# Patient Record
Sex: FEMALE | Race: WHITE | NOT HISPANIC OR LATINO
[De-identification: names, ages, dates, MRNs, and addresses within clinical notes are randomized per-mention and may not be internally consistent; named-entity substitution may affect disease eponyms.]

---

## 2017-08-12 ENCOUNTER — APPOINTMENT (OUTPATIENT)
Dept: UROLOGY | Facility: CLINIC | Age: 62
End: 2017-08-12
Payer: COMMERCIAL

## 2017-08-12 VITALS
SYSTOLIC BLOOD PRESSURE: 128 MMHG | WEIGHT: 115 LBS | HEART RATE: 60 BPM | HEIGHT: 63 IN | RESPIRATION RATE: 14 BRPM | TEMPERATURE: 97.7 F | BODY MASS INDEX: 20.38 KG/M2 | DIASTOLIC BLOOD PRESSURE: 75 MMHG

## 2017-08-12 DIAGNOSIS — Z86.39 PERSONAL HISTORY OF OTHER ENDOCRINE, NUTRITIONAL AND METABOLIC DISEASE: ICD-10-CM

## 2017-08-12 DIAGNOSIS — R31.29 OTHER MICROSCOPIC HEMATURIA: ICD-10-CM

## 2017-08-12 PROCEDURE — 99204 OFFICE O/P NEW MOD 45 MIN: CPT

## 2017-08-14 LAB — BACTERIA UR CULT: NORMAL

## 2017-08-16 LAB — CORE LAB FLUID CYTOLOGY: NORMAL

## 2017-09-11 ENCOUNTER — APPOINTMENT (OUTPATIENT)
Dept: UROLOGY | Facility: CLINIC | Age: 62
End: 2017-09-11
Payer: COMMERCIAL

## 2017-09-11 VITALS
BODY MASS INDEX: 20.73 KG/M2 | SYSTOLIC BLOOD PRESSURE: 109 MMHG | DIASTOLIC BLOOD PRESSURE: 64 MMHG | HEART RATE: 60 BPM | WEIGHT: 117 LBS

## 2017-09-11 PROCEDURE — 99214 OFFICE O/P EST MOD 30 MIN: CPT

## 2017-09-11 PROCEDURE — 81003 URINALYSIS AUTO W/O SCOPE: CPT | Mod: QW

## 2017-09-27 ENCOUNTER — APPOINTMENT (OUTPATIENT)
Dept: UROLOGY | Facility: CLINIC | Age: 62
End: 2017-09-27
Payer: COMMERCIAL

## 2017-09-27 PROCEDURE — 99214 OFFICE O/P EST MOD 30 MIN: CPT

## 2017-10-20 ENCOUNTER — OUTPATIENT (OUTPATIENT)
Dept: OUTPATIENT SERVICES | Facility: HOSPITAL | Age: 62
LOS: 1 days | End: 2017-10-20
Payer: COMMERCIAL

## 2017-10-20 VITALS
HEIGHT: 62 IN | RESPIRATION RATE: 18 BRPM | SYSTOLIC BLOOD PRESSURE: 118 MMHG | DIASTOLIC BLOOD PRESSURE: 76 MMHG | WEIGHT: 117.07 LBS | OXYGEN SATURATION: 95 % | HEART RATE: 79 BPM | TEMPERATURE: 99 F

## 2017-10-20 DIAGNOSIS — Z98.51 TUBAL LIGATION STATUS: Chronic | ICD-10-CM

## 2017-10-20 DIAGNOSIS — Z98.891 HISTORY OF UTERINE SCAR FROM PREVIOUS SURGERY: Chronic | ICD-10-CM

## 2017-10-20 DIAGNOSIS — Z01.818 ENCOUNTER FOR OTHER PREPROCEDURAL EXAMINATION: ICD-10-CM

## 2017-10-20 DIAGNOSIS — N20.0 CALCULUS OF KIDNEY: ICD-10-CM

## 2017-10-20 DIAGNOSIS — I10 ESSENTIAL (PRIMARY) HYPERTENSION: ICD-10-CM

## 2017-10-20 DIAGNOSIS — F17.200 NICOTINE DEPENDENCE, UNSPECIFIED, UNCOMPLICATED: ICD-10-CM

## 2017-10-20 DIAGNOSIS — E78.5 HYPERLIPIDEMIA, UNSPECIFIED: ICD-10-CM

## 2017-10-20 LAB
ANION GAP SERPL CALC-SCNC: 20 MMOL/L — HIGH (ref 5–17)
BLD GP AB SCN SERPL QL: POSITIVE — SIGNIFICANT CHANGE UP
BUN SERPL-MCNC: 13 MG/DL — SIGNIFICANT CHANGE UP (ref 7–23)
CALCIUM SERPL-MCNC: 10.1 MG/DL — SIGNIFICANT CHANGE UP (ref 8.4–10.5)
CHLORIDE SERPL-SCNC: 100 MMOL/L — SIGNIFICANT CHANGE UP (ref 96–108)
CO2 SERPL-SCNC: 22 MMOL/L — SIGNIFICANT CHANGE UP (ref 22–31)
CREAT SERPL-MCNC: 0.75 MG/DL — SIGNIFICANT CHANGE UP (ref 0.5–1.3)
DAT POLY-SP REAG RBC QL: NEGATIVE — SIGNIFICANT CHANGE UP
GLUCOSE SERPL-MCNC: 103 MG/DL — HIGH (ref 70–99)
HCT VFR BLD CALC: 38.7 % — SIGNIFICANT CHANGE UP (ref 34.5–45)
HGB BLD-MCNC: 13.2 G/DL — SIGNIFICANT CHANGE UP (ref 11.5–15.5)
MCHC RBC-ENTMCNC: 30.6 PG — SIGNIFICANT CHANGE UP (ref 27–34)
MCHC RBC-ENTMCNC: 34.1 GM/DL — SIGNIFICANT CHANGE UP (ref 32–36)
MCV RBC AUTO: 89.6 FL — SIGNIFICANT CHANGE UP (ref 80–100)
PLATELET # BLD AUTO: 380 K/UL — SIGNIFICANT CHANGE UP (ref 150–400)
POTASSIUM SERPL-MCNC: 4.4 MMOL/L — SIGNIFICANT CHANGE UP (ref 3.5–5.3)
POTASSIUM SERPL-SCNC: 4.4 MMOL/L — SIGNIFICANT CHANGE UP (ref 3.5–5.3)
RBC # BLD: 4.32 M/UL — SIGNIFICANT CHANGE UP (ref 3.8–5.2)
RBC # FLD: 14.4 % — SIGNIFICANT CHANGE UP (ref 10.3–14.5)
RH IG SCN BLD-IMP: NEGATIVE — SIGNIFICANT CHANGE UP
SODIUM SERPL-SCNC: 142 MMOL/L — SIGNIFICANT CHANGE UP (ref 135–145)
WBC # BLD: 9.79 K/UL — SIGNIFICANT CHANGE UP (ref 3.8–10.5)
WBC # FLD AUTO: 9.79 K/UL — SIGNIFICANT CHANGE UP (ref 3.8–10.5)

## 2017-10-20 PROCEDURE — 86077 PHYS BLOOD BANK SERV XMATCH: CPT

## 2017-10-20 RX ORDER — CEFAZOLIN SODIUM 1 G
2000 VIAL (EA) INJECTION ONCE
Qty: 0 | Refills: 0 | Status: DISCONTINUED | OUTPATIENT
Start: 2017-10-27 | End: 2017-10-29

## 2017-10-20 NOTE — H&P PST ADULT - NSANTHOSAYNRD_GEN_A_CORE
No. HALEY screening performed.  STOP BANG Legend: 0-2 = LOW Risk; 3-4 = INTERMEDIATE Risk; 5-8 = HIGH Risk

## 2017-10-20 NOTE — H&P PST ADULT - HISTORY OF PRESENT ILLNESS
This is a 63 y/o female upon a routine check up + hematuria, CT revealed  renal calculus, pt presents today for cystoscopy, left retrograde, left percutaneous nephrostolithotomy This is a 61 y/o female upon a routine check up + hematuria, CT revealed  renal calculus, pt is asymptomatic, she  presents today for cystoscopy, left retrograde, left percutaneous nephrostolithotomy

## 2017-10-20 NOTE — H&P PST ADULT - PMH
Depression    Graves disease    HTN (hypertension)  x 15 years, controlled Depression    Graves disease    HTN (hypertension)  x 15 years, controlled  Hyperlipidemia

## 2017-10-22 LAB
-  AMPICILLIN: SIGNIFICANT CHANGE UP
-  CIPROFLOXACIN: SIGNIFICANT CHANGE UP
-  NITROFURANTOIN: SIGNIFICANT CHANGE UP
-  TETRACYCLINE: SIGNIFICANT CHANGE UP
-  VANCOMYCIN: SIGNIFICANT CHANGE UP
CULTURE RESULTS: SIGNIFICANT CHANGE UP
METHOD TYPE: SIGNIFICANT CHANGE UP
ORGANISM # SPEC MICROSCOPIC CNT: SIGNIFICANT CHANGE UP
ORGANISM # SPEC MICROSCOPIC CNT: SIGNIFICANT CHANGE UP
SPECIMEN SOURCE: SIGNIFICANT CHANGE UP

## 2017-10-27 ENCOUNTER — APPOINTMENT (OUTPATIENT)
Dept: UROLOGY | Facility: HOSPITAL | Age: 62
End: 2017-10-27

## 2017-10-27 ENCOUNTER — INPATIENT (INPATIENT)
Facility: HOSPITAL | Age: 62
LOS: 2 days | Discharge: HOME | DRG: 660 | End: 2017-10-30
Attending: UROLOGY | Admitting: UROLOGY
Payer: COMMERCIAL

## 2017-10-27 ENCOUNTER — RESULT REVIEW (OUTPATIENT)
Age: 62
End: 2017-10-27

## 2017-10-27 VITALS
HEART RATE: 74 BPM | OXYGEN SATURATION: 97 % | RESPIRATION RATE: 18 BRPM | WEIGHT: 117.07 LBS | HEIGHT: 62 IN | DIASTOLIC BLOOD PRESSURE: 82 MMHG | SYSTOLIC BLOOD PRESSURE: 148 MMHG | TEMPERATURE: 97 F

## 2017-10-27 DIAGNOSIS — Z98.891 HISTORY OF UTERINE SCAR FROM PREVIOUS SURGERY: Chronic | ICD-10-CM

## 2017-10-27 DIAGNOSIS — Z01.818 ENCOUNTER FOR OTHER PREPROCEDURAL EXAMINATION: ICD-10-CM

## 2017-10-27 DIAGNOSIS — N20.0 CALCULUS OF KIDNEY: ICD-10-CM

## 2017-10-27 DIAGNOSIS — Z98.51 TUBAL LIGATION STATUS: Chronic | ICD-10-CM

## 2017-10-27 LAB
ANION GAP SERPL CALC-SCNC: 12 MMOL/L — SIGNIFICANT CHANGE UP (ref 5–17)
BASOPHILS # BLD AUTO: 0 K/UL — SIGNIFICANT CHANGE UP (ref 0–0.2)
BASOPHILS NFR BLD AUTO: 0.2 % — SIGNIFICANT CHANGE UP (ref 0–2)
BLD GP AB SCN SERPL QL: POSITIVE — SIGNIFICANT CHANGE UP
BUN SERPL-MCNC: 13 MG/DL — SIGNIFICANT CHANGE UP (ref 7–23)
CALCIUM SERPL-MCNC: 8.7 MG/DL — SIGNIFICANT CHANGE UP (ref 8.4–10.5)
CHLORIDE SERPL-SCNC: 104 MMOL/L — SIGNIFICANT CHANGE UP (ref 96–108)
CO2 SERPL-SCNC: 26 MMOL/L — SIGNIFICANT CHANGE UP (ref 22–31)
CREAT SERPL-MCNC: 0.76 MG/DL — SIGNIFICANT CHANGE UP (ref 0.5–1.3)
EOSINOPHIL # BLD AUTO: 0.2 K/UL — SIGNIFICANT CHANGE UP (ref 0–0.5)
EOSINOPHIL NFR BLD AUTO: 1.5 % — SIGNIFICANT CHANGE UP (ref 0–6)
GLUCOSE BLDC GLUCOMTR-MCNC: 92 MG/DL — SIGNIFICANT CHANGE UP (ref 70–99)
GLUCOSE SERPL-MCNC: 115 MG/DL — HIGH (ref 70–99)
HCT VFR BLD CALC: 35.8 % — SIGNIFICANT CHANGE UP (ref 34.5–45)
HGB BLD-MCNC: 12.3 G/DL — SIGNIFICANT CHANGE UP (ref 11.5–15.5)
LYMPHOCYTES # BLD AUTO: 1.8 K/UL — SIGNIFICANT CHANGE UP (ref 1–3.3)
LYMPHOCYTES # BLD AUTO: 18.1 % — SIGNIFICANT CHANGE UP (ref 13–44)
MCHC RBC-ENTMCNC: 31.8 PG — SIGNIFICANT CHANGE UP (ref 27–34)
MCHC RBC-ENTMCNC: 34.4 GM/DL — SIGNIFICANT CHANGE UP (ref 32–36)
MCV RBC AUTO: 92.5 FL — SIGNIFICANT CHANGE UP (ref 80–100)
MONOCYTES # BLD AUTO: 0.6 K/UL — SIGNIFICANT CHANGE UP (ref 0–0.9)
MONOCYTES NFR BLD AUTO: 5.8 % — SIGNIFICANT CHANGE UP (ref 2–14)
NEUTROPHILS # BLD AUTO: 7.4 K/UL — SIGNIFICANT CHANGE UP (ref 1.8–7.4)
NEUTROPHILS NFR BLD AUTO: 74.4 % — SIGNIFICANT CHANGE UP (ref 43–77)
PLATELET # BLD AUTO: 381 K/UL — SIGNIFICANT CHANGE UP (ref 150–400)
POTASSIUM SERPL-MCNC: 4.4 MMOL/L — SIGNIFICANT CHANGE UP (ref 3.5–5.3)
POTASSIUM SERPL-SCNC: 4.4 MMOL/L — SIGNIFICANT CHANGE UP (ref 3.5–5.3)
RBC # BLD: 3.87 M/UL — SIGNIFICANT CHANGE UP (ref 3.8–5.2)
RBC # FLD: 13 % — SIGNIFICANT CHANGE UP (ref 10.3–14.5)
RH IG SCN BLD-IMP: NEGATIVE — SIGNIFICANT CHANGE UP
SODIUM SERPL-SCNC: 142 MMOL/L — SIGNIFICANT CHANGE UP (ref 135–145)
WBC # BLD: 10 K/UL — SIGNIFICANT CHANGE UP (ref 3.8–10.5)
WBC # FLD AUTO: 10 K/UL — SIGNIFICANT CHANGE UP (ref 3.8–10.5)

## 2017-10-27 PROCEDURE — 86880 COOMBS TEST DIRECT: CPT

## 2017-10-27 PROCEDURE — 87186 SC STD MICRODIL/AGAR DIL: CPT

## 2017-10-27 PROCEDURE — 86900 BLOOD TYPING SEROLOGIC ABO: CPT

## 2017-10-27 PROCEDURE — 86850 RBC ANTIBODY SCREEN: CPT

## 2017-10-27 PROCEDURE — 85027 COMPLETE CBC AUTOMATED: CPT

## 2017-10-27 PROCEDURE — 80048 BASIC METABOLIC PNL TOTAL CA: CPT

## 2017-10-27 PROCEDURE — 76000 FLUOROSCOPY <1 HR PHYS/QHP: CPT

## 2017-10-27 PROCEDURE — 86905 BLOOD TYPING RBC ANTIGENS: CPT

## 2017-10-27 PROCEDURE — G0463: CPT

## 2017-10-27 PROCEDURE — 87086 URINE CULTURE/COLONY COUNT: CPT

## 2017-10-27 PROCEDURE — 86901 BLOOD TYPING SEROLOGIC RH(D): CPT

## 2017-10-27 PROCEDURE — 88300 SURGICAL PATH GROSS: CPT | Mod: 26

## 2017-10-27 PROCEDURE — 86870 RBC ANTIBODY IDENTIFICATION: CPT

## 2017-10-27 RX ORDER — ROSUVASTATIN CALCIUM 5 MG/1
1 TABLET ORAL
Qty: 0 | Refills: 0 | COMMUNITY

## 2017-10-27 RX ORDER — GENTAMICIN SULFATE 40 MG/ML
60 VIAL (ML) INJECTION EVERY 8 HOURS
Qty: 0 | Refills: 0 | Status: COMPLETED | OUTPATIENT
Start: 2017-10-27 | End: 2017-10-27

## 2017-10-27 RX ORDER — SERTRALINE 25 MG/1
1 TABLET, FILM COATED ORAL
Qty: 0 | Refills: 0 | COMMUNITY

## 2017-10-27 RX ORDER — AMLODIPINE BESYLATE 2.5 MG/1
1 TABLET ORAL
Qty: 0 | Refills: 0 | COMMUNITY

## 2017-10-27 RX ORDER — LIDOCAINE HCL 20 MG/ML
0.2 VIAL (ML) INJECTION ONCE
Qty: 0 | Refills: 0 | Status: DISCONTINUED | OUTPATIENT
Start: 2017-10-27 | End: 2017-10-27

## 2017-10-27 RX ORDER — ONDANSETRON 8 MG/1
4 TABLET, FILM COATED ORAL ONCE
Qty: 0 | Refills: 0 | Status: DISCONTINUED | OUTPATIENT
Start: 2017-10-27 | End: 2017-10-28

## 2017-10-27 RX ORDER — LEVOTHYROXINE SODIUM 125 MCG
88 TABLET ORAL DAILY
Qty: 0 | Refills: 0 | Status: DISCONTINUED | OUTPATIENT
Start: 2017-10-27 | End: 2017-10-30

## 2017-10-27 RX ORDER — SENNA PLUS 8.6 MG/1
2 TABLET ORAL AT BEDTIME
Qty: 0 | Refills: 0 | Status: DISCONTINUED | OUTPATIENT
Start: 2017-10-27 | End: 2017-10-30

## 2017-10-27 RX ORDER — AMLODIPINE BESYLATE 2.5 MG/1
10 TABLET ORAL DAILY
Qty: 0 | Refills: 0 | Status: DISCONTINUED | OUTPATIENT
Start: 2017-10-27 | End: 2017-10-30

## 2017-10-27 RX ORDER — SODIUM CHLORIDE 9 MG/ML
1000 INJECTION INTRAMUSCULAR; INTRAVENOUS; SUBCUTANEOUS
Qty: 0 | Refills: 0 | Status: DISCONTINUED | OUTPATIENT
Start: 2017-10-27 | End: 2017-10-29

## 2017-10-27 RX ORDER — OXYCODONE AND ACETAMINOPHEN 5; 325 MG/1; MG/1
2 TABLET ORAL EVERY 4 HOURS
Qty: 0 | Refills: 0 | Status: DISCONTINUED | OUTPATIENT
Start: 2017-10-27 | End: 2017-10-28

## 2017-10-27 RX ORDER — HYDROMORPHONE HYDROCHLORIDE 2 MG/ML
0.5 INJECTION INTRAMUSCULAR; INTRAVENOUS; SUBCUTANEOUS
Qty: 0 | Refills: 0 | Status: DISCONTINUED | OUTPATIENT
Start: 2017-10-27 | End: 2017-10-28

## 2017-10-27 RX ORDER — SERTRALINE 25 MG/1
100 TABLET, FILM COATED ORAL DAILY
Qty: 0 | Refills: 0 | Status: DISCONTINUED | OUTPATIENT
Start: 2017-10-27 | End: 2017-10-30

## 2017-10-27 RX ORDER — AMPICILLIN TRIHYDRATE 250 MG
2 CAPSULE ORAL EVERY 6 HOURS
Qty: 0 | Refills: 0 | Status: DISCONTINUED | OUTPATIENT
Start: 2017-10-27 | End: 2017-10-28

## 2017-10-27 RX ORDER — ATORVASTATIN CALCIUM 80 MG/1
80 TABLET, FILM COATED ORAL AT BEDTIME
Qty: 0 | Refills: 0 | Status: DISCONTINUED | OUTPATIENT
Start: 2017-10-27 | End: 2017-10-30

## 2017-10-27 RX ORDER — HEPARIN SODIUM 5000 [USP'U]/ML
5000 INJECTION INTRAVENOUS; SUBCUTANEOUS EVERY 8 HOURS
Qty: 0 | Refills: 0 | Status: DISCONTINUED | OUTPATIENT
Start: 2017-10-27 | End: 2017-10-30

## 2017-10-27 RX ORDER — ACETAMINOPHEN 500 MG
650 TABLET ORAL EVERY 6 HOURS
Qty: 0 | Refills: 0 | Status: DISCONTINUED | OUTPATIENT
Start: 2017-10-27 | End: 2017-10-28

## 2017-10-27 RX ORDER — LEVOTHYROXINE SODIUM 125 MCG
1 TABLET ORAL
Qty: 0 | Refills: 0 | COMMUNITY

## 2017-10-27 RX ORDER — OXYCODONE AND ACETAMINOPHEN 5; 325 MG/1; MG/1
1 TABLET ORAL EVERY 4 HOURS
Qty: 0 | Refills: 0 | Status: DISCONTINUED | OUTPATIENT
Start: 2017-10-27 | End: 2017-10-28

## 2017-10-27 RX ORDER — SODIUM CHLORIDE 9 MG/ML
3 INJECTION INTRAMUSCULAR; INTRAVENOUS; SUBCUTANEOUS EVERY 8 HOURS
Qty: 0 | Refills: 0 | Status: DISCONTINUED | OUTPATIENT
Start: 2017-10-27 | End: 2017-10-27

## 2017-10-27 RX ADMIN — HYDROMORPHONE HYDROCHLORIDE 0.5 MILLIGRAM(S): 2 INJECTION INTRAMUSCULAR; INTRAVENOUS; SUBCUTANEOUS at 22:20

## 2017-10-27 RX ADMIN — HYDROMORPHONE HYDROCHLORIDE 0.5 MILLIGRAM(S): 2 INJECTION INTRAMUSCULAR; INTRAVENOUS; SUBCUTANEOUS at 20:03

## 2017-10-27 RX ADMIN — ATORVASTATIN CALCIUM 80 MILLIGRAM(S): 80 TABLET, FILM COATED ORAL at 22:12

## 2017-10-27 RX ADMIN — HYDROMORPHONE HYDROCHLORIDE 0.5 MILLIGRAM(S): 2 INJECTION INTRAMUSCULAR; INTRAVENOUS; SUBCUTANEOUS at 21:48

## 2017-10-27 RX ADMIN — HYDROMORPHONE HYDROCHLORIDE 0.5 MILLIGRAM(S): 2 INJECTION INTRAMUSCULAR; INTRAVENOUS; SUBCUTANEOUS at 20:26

## 2017-10-27 RX ADMIN — Medication 200 MILLIGRAM(S): at 22:42

## 2017-10-27 RX ADMIN — HYDROMORPHONE HYDROCHLORIDE 0.5 MILLIGRAM(S): 2 INJECTION INTRAMUSCULAR; INTRAVENOUS; SUBCUTANEOUS at 19:45

## 2017-10-27 RX ADMIN — SODIUM CHLORIDE 125 MILLILITER(S): 9 INJECTION INTRAMUSCULAR; INTRAVENOUS; SUBCUTANEOUS at 19:49

## 2017-10-27 RX ADMIN — HYDROMORPHONE HYDROCHLORIDE 0.5 MILLIGRAM(S): 2 INJECTION INTRAMUSCULAR; INTRAVENOUS; SUBCUTANEOUS at 20:00

## 2017-10-27 RX ADMIN — HEPARIN SODIUM 5000 UNIT(S): 5000 INJECTION INTRAVENOUS; SUBCUTANEOUS at 22:12

## 2017-10-27 NOTE — BRIEF OPERATIVE NOTE - PROCEDURE
<<-----Click on this checkbox to enter Procedure Percutaneous nephrolithotomy  10/27/2017    Active  DONNELLWAYANELY

## 2017-10-28 ENCOUNTER — TRANSCRIPTION ENCOUNTER (OUTPATIENT)
Age: 62
End: 2017-10-28

## 2017-10-28 LAB
ANION GAP SERPL CALC-SCNC: 12 MMOL/L — SIGNIFICANT CHANGE UP (ref 5–17)
BUN SERPL-MCNC: 10 MG/DL — SIGNIFICANT CHANGE UP (ref 7–23)
CALCIUM SERPL-MCNC: 8.2 MG/DL — LOW (ref 8.4–10.5)
CHLORIDE SERPL-SCNC: 101 MMOL/L — SIGNIFICANT CHANGE UP (ref 96–108)
CO2 SERPL-SCNC: 22 MMOL/L — SIGNIFICANT CHANGE UP (ref 22–31)
CREAT SERPL-MCNC: 0.58 MG/DL — SIGNIFICANT CHANGE UP (ref 0.5–1.3)
GLUCOSE SERPL-MCNC: 110 MG/DL — HIGH (ref 70–99)
HCT VFR BLD CALC: 34.8 % — SIGNIFICANT CHANGE UP (ref 34.5–45)
HGB BLD-MCNC: 11.8 G/DL — SIGNIFICANT CHANGE UP (ref 11.5–15.5)
MCHC RBC-ENTMCNC: 31.1 PG — SIGNIFICANT CHANGE UP (ref 27–34)
MCHC RBC-ENTMCNC: 34 GM/DL — SIGNIFICANT CHANGE UP (ref 32–36)
MCV RBC AUTO: 91.6 FL — SIGNIFICANT CHANGE UP (ref 80–100)
PLATELET # BLD AUTO: 390 K/UL — SIGNIFICANT CHANGE UP (ref 150–400)
POTASSIUM SERPL-MCNC: 4.3 MMOL/L — SIGNIFICANT CHANGE UP (ref 3.5–5.3)
POTASSIUM SERPL-SCNC: 4.3 MMOL/L — SIGNIFICANT CHANGE UP (ref 3.5–5.3)
RBC # BLD: 3.8 M/UL — SIGNIFICANT CHANGE UP (ref 3.8–5.2)
RBC # FLD: 13 % — SIGNIFICANT CHANGE UP (ref 10.3–14.5)
SODIUM SERPL-SCNC: 135 MMOL/L — SIGNIFICANT CHANGE UP (ref 135–145)
WBC # BLD: 12 K/UL — HIGH (ref 3.8–10.5)
WBC # FLD AUTO: 12 K/UL — HIGH (ref 3.8–10.5)

## 2017-10-28 RX ORDER — ACETAMINOPHEN 500 MG
650 TABLET ORAL EVERY 6 HOURS
Qty: 0 | Refills: 0 | Status: DISCONTINUED | OUTPATIENT
Start: 2017-10-28 | End: 2017-10-30

## 2017-10-28 RX ORDER — PIPERACILLIN AND TAZOBACTAM 4; .5 G/20ML; G/20ML
3.38 INJECTION, POWDER, LYOPHILIZED, FOR SOLUTION INTRAVENOUS EVERY 8 HOURS
Qty: 0 | Refills: 0 | Status: DISCONTINUED | OUTPATIENT
Start: 2017-10-28 | End: 2017-10-30

## 2017-10-28 RX ORDER — ONDANSETRON 8 MG/1
4 TABLET, FILM COATED ORAL ONCE
Qty: 0 | Refills: 0 | Status: DISCONTINUED | OUTPATIENT
Start: 2017-10-28 | End: 2017-10-30

## 2017-10-28 RX ORDER — ACETAMINOPHEN 500 MG
650 TABLET ORAL EVERY 6 HOURS
Qty: 0 | Refills: 0 | Status: DISCONTINUED | OUTPATIENT
Start: 2017-10-28 | End: 2017-10-28

## 2017-10-28 RX ORDER — OXYCODONE HYDROCHLORIDE 5 MG/1
10 TABLET ORAL EVERY 6 HOURS
Qty: 0 | Refills: 0 | Status: DISCONTINUED | OUTPATIENT
Start: 2017-10-28 | End: 2017-10-30

## 2017-10-28 RX ORDER — PIPERACILLIN AND TAZOBACTAM 4; .5 G/20ML; G/20ML
3.38 INJECTION, POWDER, LYOPHILIZED, FOR SOLUTION INTRAVENOUS ONCE
Qty: 0 | Refills: 0 | Status: COMPLETED | OUTPATIENT
Start: 2017-10-28 | End: 2017-10-28

## 2017-10-28 RX ORDER — OXYCODONE HYDROCHLORIDE 5 MG/1
5 TABLET ORAL EVERY 4 HOURS
Qty: 0 | Refills: 0 | Status: DISCONTINUED | OUTPATIENT
Start: 2017-10-28 | End: 2017-10-30

## 2017-10-28 RX ORDER — NICOTINE POLACRILEX 2 MG
1 GUM BUCCAL DAILY
Qty: 0 | Refills: 0 | Status: DISCONTINUED | OUTPATIENT
Start: 2017-10-28 | End: 2017-10-30

## 2017-10-28 RX ADMIN — Medication 216 GRAM(S): at 18:48

## 2017-10-28 RX ADMIN — OXYCODONE HYDROCHLORIDE 10 MILLIGRAM(S): 5 TABLET ORAL at 21:21

## 2017-10-28 RX ADMIN — AMLODIPINE BESYLATE 10 MILLIGRAM(S): 2.5 TABLET ORAL at 07:09

## 2017-10-28 RX ADMIN — HEPARIN SODIUM 5000 UNIT(S): 5000 INJECTION INTRAVENOUS; SUBCUTANEOUS at 15:08

## 2017-10-28 RX ADMIN — SERTRALINE 100 MILLIGRAM(S): 25 TABLET, FILM COATED ORAL at 11:23

## 2017-10-28 RX ADMIN — HEPARIN SODIUM 5000 UNIT(S): 5000 INJECTION INTRAVENOUS; SUBCUTANEOUS at 21:22

## 2017-10-28 RX ADMIN — OXYCODONE AND ACETAMINOPHEN 2 TABLET(S): 5; 325 TABLET ORAL at 06:21

## 2017-10-28 RX ADMIN — SODIUM CHLORIDE 125 MILLILITER(S): 9 INJECTION INTRAMUSCULAR; INTRAVENOUS; SUBCUTANEOUS at 07:09

## 2017-10-28 RX ADMIN — OXYCODONE AND ACETAMINOPHEN 2 TABLET(S): 5; 325 TABLET ORAL at 05:51

## 2017-10-28 RX ADMIN — Medication 1 PATCH: at 17:00

## 2017-10-28 RX ADMIN — Medication 650 MILLIGRAM(S): at 22:25

## 2017-10-28 RX ADMIN — HEPARIN SODIUM 5000 UNIT(S): 5000 INJECTION INTRAVENOUS; SUBCUTANEOUS at 07:09

## 2017-10-28 RX ADMIN — OXYCODONE AND ACETAMINOPHEN 2 TABLET(S): 5; 325 TABLET ORAL at 11:53

## 2017-10-28 RX ADMIN — Medication 216 GRAM(S): at 11:23

## 2017-10-28 RX ADMIN — PIPERACILLIN AND TAZOBACTAM 200 GRAM(S): 4; .5 INJECTION, POWDER, LYOPHILIZED, FOR SOLUTION INTRAVENOUS at 21:23

## 2017-10-28 RX ADMIN — OXYCODONE HYDROCHLORIDE 10 MILLIGRAM(S): 5 TABLET ORAL at 21:51

## 2017-10-28 RX ADMIN — ATORVASTATIN CALCIUM 80 MILLIGRAM(S): 80 TABLET, FILM COATED ORAL at 21:22

## 2017-10-28 RX ADMIN — OXYCODONE AND ACETAMINOPHEN 2 TABLET(S): 5; 325 TABLET ORAL at 11:23

## 2017-10-28 RX ADMIN — Medication 88 MICROGRAM(S): at 05:50

## 2017-10-28 RX ADMIN — Medication 216 GRAM(S): at 05:50

## 2017-10-28 RX ADMIN — HYDROMORPHONE HYDROCHLORIDE 0.5 MILLIGRAM(S): 2 INJECTION INTRAMUSCULAR; INTRAVENOUS; SUBCUTANEOUS at 00:56

## 2017-10-28 RX ADMIN — Medication 650 MILLIGRAM(S): at 16:58

## 2017-10-28 RX ADMIN — HYDROMORPHONE HYDROCHLORIDE 0.5 MILLIGRAM(S): 2 INJECTION INTRAMUSCULAR; INTRAVENOUS; SUBCUTANEOUS at 00:26

## 2017-10-28 RX ADMIN — Medication 216 GRAM(S): at 00:25

## 2017-10-28 NOTE — PROGRESS NOTE ADULT - ASSESSMENT
62F s/p L PCNL  -- febrile o/n, will f/u kidney urine and blood cx  -- keep dean for now  -- wean O2 as tolerated  -- c/w Abx  -- trend fevers  -- OOB

## 2017-10-28 NOTE — PROGRESS NOTE ADULT - SUBJECTIVE AND OBJECTIVE BOX
Subjective  Febrile overnight 102.2, reports some chills however pt comfortable this AM    Objective    Vital signs  T(F): , Max: 102.2 (10-28-17 @ 05:26)  HR: 80 (10-28-17 @ 05:26)  BP: 128/69 (10-28-17 @ 05:26)  SpO2: 97% (10-28-17 @ 05:26)  Wt(kg): --    Output     10-27 @ 07:01  -  10-28 @ 07:00  --------------------------------------------------------  IN: 2155 mL / OUT: 1275 mL / NET: 880 mL    10-28 @ 07:01  -  10-28 @ 10:22  --------------------------------------------------------  IN: 860 mL / OUT: 0 mL / NET: 860 mL        Gen: NAD  Abd: soft, NT, ND  Back: perc site C/D/I  : jermaine in place w/clear pink output    Labs      10-28 @ 07:05    WBC 12.0  / Hct 34.8  / SCr 0.58     10-27 @ 19:41    WBC 10.0  / Hct 35.8  / SCr 0.76       Urine Cx: ?  Blood Cx: ?    Imaging

## 2017-10-29 LAB
ANION GAP SERPL CALC-SCNC: 11 MMOL/L — SIGNIFICANT CHANGE UP (ref 5–17)
BUN SERPL-MCNC: 8 MG/DL — SIGNIFICANT CHANGE UP (ref 7–23)
CALCIUM SERPL-MCNC: 8.4 MG/DL — SIGNIFICANT CHANGE UP (ref 8.4–10.5)
CHLORIDE SERPL-SCNC: 102 MMOL/L — SIGNIFICANT CHANGE UP (ref 96–108)
CO2 SERPL-SCNC: 23 MMOL/L — SIGNIFICANT CHANGE UP (ref 22–31)
CREAT SERPL-MCNC: 0.55 MG/DL — SIGNIFICANT CHANGE UP (ref 0.5–1.3)
GLUCOSE SERPL-MCNC: 91 MG/DL — SIGNIFICANT CHANGE UP (ref 70–99)
HCT VFR BLD CALC: 36.2 % — SIGNIFICANT CHANGE UP (ref 34.5–45)
HGB BLD-MCNC: 12.2 G/DL — SIGNIFICANT CHANGE UP (ref 11.5–15.5)
MCHC RBC-ENTMCNC: 30.9 PG — SIGNIFICANT CHANGE UP (ref 27–34)
MCHC RBC-ENTMCNC: 33.5 GM/DL — SIGNIFICANT CHANGE UP (ref 32–36)
MCV RBC AUTO: 92.1 FL — SIGNIFICANT CHANGE UP (ref 80–100)
PLATELET # BLD AUTO: 356 K/UL — SIGNIFICANT CHANGE UP (ref 150–400)
POTASSIUM SERPL-MCNC: 4.1 MMOL/L — SIGNIFICANT CHANGE UP (ref 3.5–5.3)
POTASSIUM SERPL-SCNC: 4.1 MMOL/L — SIGNIFICANT CHANGE UP (ref 3.5–5.3)
RBC # BLD: 3.93 M/UL — SIGNIFICANT CHANGE UP (ref 3.8–5.2)
RBC # FLD: 13 % — SIGNIFICANT CHANGE UP (ref 10.3–14.5)
SODIUM SERPL-SCNC: 136 MMOL/L — SIGNIFICANT CHANGE UP (ref 135–145)
WBC # BLD: 10.4 K/UL — SIGNIFICANT CHANGE UP (ref 3.8–10.5)
WBC # FLD AUTO: 10.4 K/UL — SIGNIFICANT CHANGE UP (ref 3.8–10.5)

## 2017-10-29 RX ORDER — VANCOMYCIN HCL 1 G
1000 VIAL (EA) INTRAVENOUS EVERY 12 HOURS
Qty: 0 | Refills: 0 | Status: DISCONTINUED | OUTPATIENT
Start: 2017-10-29 | End: 2017-10-30

## 2017-10-29 RX ADMIN — Medication 88 MICROGRAM(S): at 05:35

## 2017-10-29 RX ADMIN — ATORVASTATIN CALCIUM 80 MILLIGRAM(S): 80 TABLET, FILM COATED ORAL at 22:15

## 2017-10-29 RX ADMIN — Medication 1 PATCH: at 17:35

## 2017-10-29 RX ADMIN — HEPARIN SODIUM 5000 UNIT(S): 5000 INJECTION INTRAVENOUS; SUBCUTANEOUS at 14:06

## 2017-10-29 RX ADMIN — OXYCODONE HYDROCHLORIDE 10 MILLIGRAM(S): 5 TABLET ORAL at 22:45

## 2017-10-29 RX ADMIN — HEPARIN SODIUM 5000 UNIT(S): 5000 INJECTION INTRAVENOUS; SUBCUTANEOUS at 05:35

## 2017-10-29 RX ADMIN — Medication 1 PATCH: at 17:38

## 2017-10-29 RX ADMIN — OXYCODONE HYDROCHLORIDE 10 MILLIGRAM(S): 5 TABLET ORAL at 08:51

## 2017-10-29 RX ADMIN — SERTRALINE 100 MILLIGRAM(S): 25 TABLET, FILM COATED ORAL at 08:50

## 2017-10-29 RX ADMIN — PIPERACILLIN AND TAZOBACTAM 25 GRAM(S): 4; .5 INJECTION, POWDER, LYOPHILIZED, FOR SOLUTION INTRAVENOUS at 16:18

## 2017-10-29 RX ADMIN — OXYCODONE HYDROCHLORIDE 10 MILLIGRAM(S): 5 TABLET ORAL at 09:21

## 2017-10-29 RX ADMIN — OXYCODONE HYDROCHLORIDE 10 MILLIGRAM(S): 5 TABLET ORAL at 16:18

## 2017-10-29 RX ADMIN — PIPERACILLIN AND TAZOBACTAM 25 GRAM(S): 4; .5 INJECTION, POWDER, LYOPHILIZED, FOR SOLUTION INTRAVENOUS at 05:35

## 2017-10-29 RX ADMIN — OXYCODONE HYDROCHLORIDE 10 MILLIGRAM(S): 5 TABLET ORAL at 16:48

## 2017-10-29 RX ADMIN — HEPARIN SODIUM 5000 UNIT(S): 5000 INJECTION INTRAVENOUS; SUBCUTANEOUS at 22:15

## 2017-10-29 RX ADMIN — Medication 250 MILLIGRAM(S): at 14:06

## 2017-10-29 RX ADMIN — AMLODIPINE BESYLATE 10 MILLIGRAM(S): 2.5 TABLET ORAL at 05:35

## 2017-10-29 RX ADMIN — OXYCODONE HYDROCHLORIDE 10 MILLIGRAM(S): 5 TABLET ORAL at 22:15

## 2017-10-29 RX ADMIN — OXYCODONE HYDROCHLORIDE 5 MILLIGRAM(S): 5 TABLET ORAL at 20:04

## 2017-10-29 RX ADMIN — OXYCODONE HYDROCHLORIDE 5 MILLIGRAM(S): 5 TABLET ORAL at 20:34

## 2017-10-29 RX ADMIN — Medication 250 MILLIGRAM(S): at 23:07

## 2017-10-29 NOTE — PROGRESS NOTE ADULT - SUBJECTIVE AND OBJECTIVE BOX
UROLOGY DAILY PROGRESS NOTE:     Subjective: Patient seen and examined at bedside. Feeling better this morning.    Objective:  Vital signs  T(F): , Max: 102.9 (10-28-17 @ 11:27)  HR: 85 (10-29-17 @ 05:33)  BP: 158/78 (10-29-17 @ 05:33)  SpO2: 95% (10-29-17 @ 05:33)    I&O's Summary    28 Oct 2017 07:01  -  29 Oct 2017 07:00  --------------------------------------------------------  IN: 5155 mL / OUT: 2175 mL / NET: 2980 mL    Gen: NAD  Abd: soft, NT, ND  Back: perc site C/D/I  : dean in place w/clear pink output    Labs:  10-29  x     / x     /0.55   10-28  12.0  / 34.8  /0.58                           11.8   12.0  )-----------( 390      ( 28 Oct 2017 07:05 )             34.8     10-29    136  |  102  |  8   ----------------------------<  91  4.1   |  23  |  0.55    Ca    8.4      29 Oct 2017 07:14    Urine Cx: E. faecalis

## 2017-10-30 ENCOUNTER — TRANSCRIPTION ENCOUNTER (OUTPATIENT)
Age: 62
End: 2017-10-30

## 2017-10-30 VITALS
HEART RATE: 57 BPM | RESPIRATION RATE: 18 BRPM | DIASTOLIC BLOOD PRESSURE: 68 MMHG | OXYGEN SATURATION: 96 % | SYSTOLIC BLOOD PRESSURE: 103 MMHG | TEMPERATURE: 99 F

## 2017-10-30 LAB
-  AMPICILLIN: SIGNIFICANT CHANGE UP
-  CIPROFLOXACIN: SIGNIFICANT CHANGE UP
-  ERYTHROMYCIN: SIGNIFICANT CHANGE UP
-  TETRACYCLINE: SIGNIFICANT CHANGE UP
-  VANCOMYCIN: SIGNIFICANT CHANGE UP
ANION GAP SERPL CALC-SCNC: 10 MMOL/L — SIGNIFICANT CHANGE UP (ref 5–17)
BUN SERPL-MCNC: 6 MG/DL — LOW (ref 7–23)
CALCIUM SERPL-MCNC: 9 MG/DL — SIGNIFICANT CHANGE UP (ref 8.4–10.5)
CHLORIDE SERPL-SCNC: 102 MMOL/L — SIGNIFICANT CHANGE UP (ref 96–108)
CO2 SERPL-SCNC: 28 MMOL/L — SIGNIFICANT CHANGE UP (ref 22–31)
CREAT SERPL-MCNC: 0.62 MG/DL — SIGNIFICANT CHANGE UP (ref 0.5–1.3)
CULTURE RESULTS: SIGNIFICANT CHANGE UP
GLUCOSE SERPL-MCNC: 108 MG/DL — HIGH (ref 70–99)
HCT VFR BLD CALC: 34.8 % — SIGNIFICANT CHANGE UP (ref 34.5–45)
HGB BLD-MCNC: 12 G/DL — SIGNIFICANT CHANGE UP (ref 11.5–15.5)
MCHC RBC-ENTMCNC: 31.4 PG — SIGNIFICANT CHANGE UP (ref 27–34)
MCHC RBC-ENTMCNC: 34.5 GM/DL — SIGNIFICANT CHANGE UP (ref 32–36)
MCV RBC AUTO: 91.3 FL — SIGNIFICANT CHANGE UP (ref 80–100)
METHOD TYPE: SIGNIFICANT CHANGE UP
ORGANISM # SPEC MICROSCOPIC CNT: SIGNIFICANT CHANGE UP
ORGANISM # SPEC MICROSCOPIC CNT: SIGNIFICANT CHANGE UP
PLATELET # BLD AUTO: 347 K/UL — SIGNIFICANT CHANGE UP (ref 150–400)
POTASSIUM SERPL-MCNC: 3.9 MMOL/L — SIGNIFICANT CHANGE UP (ref 3.5–5.3)
POTASSIUM SERPL-SCNC: 3.9 MMOL/L — SIGNIFICANT CHANGE UP (ref 3.5–5.3)
RBC # BLD: 3.81 M/UL — SIGNIFICANT CHANGE UP (ref 3.8–5.2)
RBC # FLD: 13 % — SIGNIFICANT CHANGE UP (ref 10.3–14.5)
SODIUM SERPL-SCNC: 140 MMOL/L — SIGNIFICANT CHANGE UP (ref 135–145)
SPECIMEN SOURCE: SIGNIFICANT CHANGE UP
WBC # BLD: 9.1 K/UL — SIGNIFICANT CHANGE UP (ref 3.8–10.5)
WBC # FLD AUTO: 9.1 K/UL — SIGNIFICANT CHANGE UP (ref 3.8–10.5)

## 2017-10-30 PROCEDURE — 80048 BASIC METABOLIC PNL TOTAL CA: CPT

## 2017-10-30 PROCEDURE — 87040 BLOOD CULTURE FOR BACTERIA: CPT

## 2017-10-30 PROCEDURE — C1887: CPT

## 2017-10-30 PROCEDURE — 87070 CULTURE OTHR SPECIMN AEROBIC: CPT

## 2017-10-30 PROCEDURE — 86850 RBC ANTIBODY SCREEN: CPT

## 2017-10-30 PROCEDURE — 86870 RBC ANTIBODY IDENTIFICATION: CPT

## 2017-10-30 PROCEDURE — C1889: CPT

## 2017-10-30 PROCEDURE — 82962 GLUCOSE BLOOD TEST: CPT

## 2017-10-30 PROCEDURE — 85027 COMPLETE CBC AUTOMATED: CPT

## 2017-10-30 PROCEDURE — 86900 BLOOD TYPING SEROLOGIC ABO: CPT

## 2017-10-30 PROCEDURE — C1758: CPT

## 2017-10-30 PROCEDURE — 87186 SC STD MICRODIL/AGAR DIL: CPT

## 2017-10-30 PROCEDURE — C1726: CPT

## 2017-10-30 PROCEDURE — C1769: CPT

## 2017-10-30 PROCEDURE — 82365 CALCULUS SPECTROSCOPY: CPT

## 2017-10-30 PROCEDURE — 88300 SURGICAL PATH GROSS: CPT

## 2017-10-30 PROCEDURE — 86922 COMPATIBILITY TEST ANTIGLOB: CPT

## 2017-10-30 PROCEDURE — 99024 POSTOP FOLLOW-UP VISIT: CPT

## 2017-10-30 PROCEDURE — C2617: CPT

## 2017-10-30 PROCEDURE — 86901 BLOOD TYPING SEROLOGIC RH(D): CPT

## 2017-10-30 RX ADMIN — Medication 88 MICROGRAM(S): at 05:28

## 2017-10-30 RX ADMIN — PIPERACILLIN AND TAZOBACTAM 25 GRAM(S): 4; .5 INJECTION, POWDER, LYOPHILIZED, FOR SOLUTION INTRAVENOUS at 02:47

## 2017-10-30 RX ADMIN — OXYCODONE HYDROCHLORIDE 5 MILLIGRAM(S): 5 TABLET ORAL at 10:25

## 2017-10-30 RX ADMIN — OXYCODONE HYDROCHLORIDE 10 MILLIGRAM(S): 5 TABLET ORAL at 06:00

## 2017-10-30 RX ADMIN — OXYCODONE HYDROCHLORIDE 10 MILLIGRAM(S): 5 TABLET ORAL at 05:29

## 2017-10-30 RX ADMIN — SERTRALINE 100 MILLIGRAM(S): 25 TABLET, FILM COATED ORAL at 10:26

## 2017-10-30 RX ADMIN — AMLODIPINE BESYLATE 10 MILLIGRAM(S): 2.5 TABLET ORAL at 05:28

## 2017-10-30 RX ADMIN — PIPERACILLIN AND TAZOBACTAM 25 GRAM(S): 4; .5 INJECTION, POWDER, LYOPHILIZED, FOR SOLUTION INTRAVENOUS at 10:12

## 2017-10-30 RX ADMIN — OXYCODONE HYDROCHLORIDE 5 MILLIGRAM(S): 5 TABLET ORAL at 10:55

## 2017-10-30 RX ADMIN — OXYCODONE HYDROCHLORIDE 10 MILLIGRAM(S): 5 TABLET ORAL at 13:22

## 2017-10-30 RX ADMIN — HEPARIN SODIUM 5000 UNIT(S): 5000 INJECTION INTRAVENOUS; SUBCUTANEOUS at 05:28

## 2017-10-30 RX ADMIN — OXYCODONE HYDROCHLORIDE 10 MILLIGRAM(S): 5 TABLET ORAL at 12:52

## 2017-10-30 NOTE — DISCHARGE NOTE ADULT - PLAN OF CARE
removal Please follow up with Dr. Gamez in 1-2 weeks.  Call [phone] to schedule/confirm your appointment.  Call or follow up sooner with fevers, chills, nausea, vomiting, increasing pain, or with other concerns.  Finish course of Augmentin cont BP meds cont cholesterol cont Synthroid

## 2017-10-30 NOTE — DISCHARGE NOTE ADULT - CARE PROVIDER_API CALL
Hoenig, David M (MD), Urology  [facility] Dept of Urology  [address] [place] [address]  Phone: [phone]  Fax: [phone]

## 2017-10-30 NOTE — DISCHARGE NOTE ADULT - CONDITIONS AT DISCHARGE
pt a&ox4. VSS. pain managed with oxycodone. L flank incision with gauze and teg. pt voiding, tolerating diet, ambulatory independently.

## 2017-10-30 NOTE — DISCHARGE NOTE ADULT - CARE PROVIDERS DIRECT ADDRESSES
[email or web address]

## 2017-10-30 NOTE — DISCHARGE NOTE ADULT - MEDICATION SUMMARY - MEDICATIONS TO STOP TAKING
I will STOP taking the medications listed below when I get home from the hospital:    Augmentin XR 1000 mg-62.5 mg oral tablet, extended release

## 2017-10-30 NOTE — DISCHARGE NOTE ADULT - MEDICATION SUMMARY - MEDICATIONS TO TAKE
I will START or STAY ON the medications listed below when I get home from the hospital:    oxyCODONE-acetaminophen 5 mg-325 mg oral tablet  -- 1-2 tab(s) by mouth every 4-6 hours, As Needed MDD:8  -- Caution federal law prohibits the transfer of this drug to any person other  than the person for whom it was prescribed.  May cause drowsiness.  Alcohol may intensify this effect.  Use care when operating dangerous machinery.  This prescription cannot be refilled.  This product contains acetaminophen.  Do not use  with any other product containing acetaminophen to prevent possible liver damage.  Using more of this medication than prescribed may cause serious breathing problems.    -- Indication: For pain    Zoloft 100 mg oral tablet  -- 1 tab(s) by mouth once a day  -- Indication: For home med    Crestor 40 mg oral tablet  -- 1 tab(s) by mouth once a day (at bedtime)  -- Indication: For Cholesterol    Norvasc 10 mg oral tablet  -- 1 tab(s) by mouth once a day  -- Indication: For blood pressure    amoxicillin-clavulanate 875 mg-125 mg oral tablet  -- 1 tab(s) by mouth every 12 hours   -- Finish all this medication unless otherwise directed by prescriber.  Take with food or milk.    -- Indication: For antibiotic    Synthroid 88 mcg (0.088 mg) oral tablet  -- 1 tab(s) by mouth once a day  -- Indication: For thyroid

## 2017-10-30 NOTE — PROGRESS NOTE ADULT - SUBJECTIVE AND OBJECTIVE BOX
UROLOGY DAILY PROGRESS NOTE:     Subjective: Patient seen and examined at bedside. No overnight events.       Objective:  Vital signs  T(F): , Max: 100.2 (10-29-17 @ 08:29)  HR: 71 (10-30-17 @ 04:45)  BP: 148/70 (10-30-17 @ 04:45)  SpO2: 95% (10-30-17 @ 04:45)  Wt(kg): --    I&O's Summary    29 Oct 2017 07:01  -  30 Oct 2017 07:00  --------------------------------------------------------  IN: 2635 mL / OUT: 3900 mL / NET: -1265 mL        Gen: NAD  Pulm: No respiratory distress, no subcostal retractions  CV: RRR, no JVD  Abd: Soft, NT, ND  Back: NT site CDI  : Ralph- light pink    Labs:  10-30  9.1   / 34.8  /0.62   10-29  10.4  / 36.2  /0.55                           12.0   9.1   )-----------( 347      ( 30 Oct 2017 06:30 )             34.8     10-30    140  |  102  |  6<L>  ----------------------------<  108<H>  3.9   |  28  |  0.62    Ca    9.0      30 Oct 2017 06:30          Urine Cx:

## 2017-10-30 NOTE — PROGRESS NOTE ADULT - ATTENDING COMMENTS
Pt seen and examined.  Febrile over the weekend, but now improving and afebrile for ~ 24 hours.  resp wnl  urine clearing    Agree with d/c planning, augmentin.  no heavy straining or lifting  f/u in approx 1 week with stent removal.

## 2017-10-30 NOTE — PROVIDER CONTACT NOTE (OTHER) - ASSESSMENT
Pt is A&Ox4, able to verbalize understanding. at 21:30, 1000cc's emptied from dean & since then output has only reached 50cc's. Suprapubic site feels full & rigid upon palpation. Pt complains that burning & pain feels worse when having a BM. Pt states that oxy 10mg did not alleviate pain. Urine is dark cecelia & clear. No bleeding noted from catheter site.

## 2017-10-30 NOTE — PROVIDER CONTACT NOTE (OTHER) - ACTION/TREATMENT ORDERED:
As per PA, irrigate dean & notify PA w/ results. No further intervention necessary, will continue to monitor.

## 2017-10-30 NOTE — DISCHARGE NOTE ADULT - CARE PLAN
Principal Discharge DX:	Kidney stone  Goal:	removal  Instructions for follow-up, activity and diet:	Please follow up with Dr. Gamez in 1-2 weeks.  Call [phone] to schedule/confirm your appointment.  Call or follow up sooner with fevers, chills, nausea, vomiting, increasing pain, or with other concerns.  Finish course of Augmentin  Secondary Diagnosis:	HTN (hypertension)  Instructions for follow-up, activity and diet:	cont BP meds  Secondary Diagnosis:	Hyperlipidemia  Instructions for follow-up, activity and diet:	cont cholesterol  Secondary Diagnosis:	Graves disease  Instructions for follow-up, activity and diet:	cont Synthroid

## 2017-10-30 NOTE — PROVIDER CONTACT NOTE (OTHER) - BACKGROUND
Pt admitted on 10/27 for open hernia repair, excision of old mesh, SHELBY & elevation of abdominal flap.

## 2017-10-30 NOTE — DISCHARGE NOTE ADULT - PATIENT PORTAL LINK FT
“You can access the [employer] Patient Portal, offered by [facility], by registering with the following website: [email or web address]”

## 2017-10-30 NOTE — DISCHARGE NOTE ADULT - HOSPITAL COURSE
Underwent a L PCNL on 10/27. Post op was tubeless, had stent on a string. Patient was febrile overnight into POD1, cultures sent, started on Vanco and Zosyn.  Urine culture preop was Enterococcus, OR culture was Enterococcus.  She defervesced and her dean was removed on POD3. She voided and was sent home with 7 days of Augmentin.

## 2017-10-31 LAB — COMPN STONE: SIGNIFICANT CHANGE UP

## 2017-11-02 LAB
CULTURE RESULTS: SIGNIFICANT CHANGE UP
CULTURE RESULTS: SIGNIFICANT CHANGE UP
SPECIMEN SOURCE: SIGNIFICANT CHANGE UP
SPECIMEN SOURCE: SIGNIFICANT CHANGE UP

## 2017-11-03 ENCOUNTER — APPOINTMENT (OUTPATIENT)
Dept: UROLOGY | Facility: CLINIC | Age: 62
End: 2017-11-03
Payer: COMMERCIAL

## 2017-11-03 DIAGNOSIS — B95.2 URINARY TRACT INFECTION, SITE NOT SPECIFIED: ICD-10-CM

## 2017-11-03 DIAGNOSIS — N39.0 URINARY TRACT INFECTION, SITE NOT SPECIFIED: ICD-10-CM

## 2017-11-03 DIAGNOSIS — N20.0 CALCULUS OF KIDNEY: ICD-10-CM

## 2017-11-03 PROCEDURE — 99214 OFFICE O/P EST MOD 30 MIN: CPT | Mod: 24

## 2017-11-13 LAB — SURGICAL PATHOLOGY STUDY: SIGNIFICANT CHANGE UP

## 2018-03-13 NOTE — PATIENT PROFILE ADULT. - PACKS PER DAY
:    Sienna from [employer] called and a few more questions regarding patient.    NITA Muir on 3/13/2018 at 9:01 AM   1

## 2018-04-25 ENCOUNTER — OUTPATIENT (OUTPATIENT)
Dept: OUTPATIENT SERVICES | Facility: HOSPITAL | Age: 63
LOS: 1 days | End: 2018-04-25
Payer: COMMERCIAL

## 2018-04-25 ENCOUNTER — APPOINTMENT (OUTPATIENT)
Dept: UROLOGY | Facility: CLINIC | Age: 63
End: 2018-04-25
Payer: COMMERCIAL

## 2018-04-25 DIAGNOSIS — Z98.891 HISTORY OF UTERINE SCAR FROM PREVIOUS SURGERY: Chronic | ICD-10-CM

## 2018-04-25 DIAGNOSIS — Z98.51 TUBAL LIGATION STATUS: Chronic | ICD-10-CM

## 2018-04-25 DIAGNOSIS — R35.0 FREQUENCY OF MICTURITION: ICD-10-CM

## 2018-04-25 DIAGNOSIS — E72.09 OTHER DISORDERS OF AMINO-ACID TRANSPORT: ICD-10-CM

## 2018-04-25 PROCEDURE — 76775 US EXAM ABDO BACK WALL LIM: CPT | Mod: 26

## 2018-04-25 PROCEDURE — 99213 OFFICE O/P EST LOW 20 MIN: CPT | Mod: 25

## 2018-04-25 PROCEDURE — 76775 US EXAM ABDO BACK WALL LIM: CPT

## 2018-04-25 RX ORDER — OXYCODONE AND ACETAMINOPHEN 5; 325 MG/1; MG/1
5-325 TABLET ORAL
Qty: 28 | Refills: 0 | Status: COMPLETED | COMMUNITY
Start: 2017-10-30 | End: 2018-04-25

## 2018-04-25 RX ORDER — AMOXICILLIN AND CLAVULANATE POTASSIUM 875; 125 MG/1; MG/1
875-125 TABLET, COATED ORAL
Qty: 20 | Refills: 0 | Status: COMPLETED | COMMUNITY
Start: 2017-10-23 | End: 2018-04-25

## 2018-04-26 DIAGNOSIS — E72.09 OTHER DISORDERS OF AMINO-ACID TRANSPORT: ICD-10-CM

## 2018-04-26 DIAGNOSIS — N20.0 CALCULUS OF KIDNEY: ICD-10-CM

## 2018-05-03 ENCOUNTER — RX RENEWAL (OUTPATIENT)
Age: 63
End: 2018-05-03

## 2018-06-04 ENCOUNTER — RX RENEWAL (OUTPATIENT)
Age: 63
End: 2018-06-04

## 2018-06-12 ENCOUNTER — APPOINTMENT (OUTPATIENT)
Dept: FAMILY MEDICINE | Facility: CLINIC | Age: 63
End: 2018-06-12
Payer: COMMERCIAL

## 2018-06-12 ENCOUNTER — LABORATORY RESULT (OUTPATIENT)
Age: 63
End: 2018-06-12

## 2018-06-12 VITALS
SYSTOLIC BLOOD PRESSURE: 142 MMHG | DIASTOLIC BLOOD PRESSURE: 60 MMHG | OXYGEN SATURATION: 96 % | HEART RATE: 77 BPM | BODY MASS INDEX: 20.02 KG/M2 | RESPIRATION RATE: 18 BRPM | HEIGHT: 63 IN | TEMPERATURE: 98 F | WEIGHT: 113 LBS

## 2018-06-12 DIAGNOSIS — Z82.49 FAMILY HISTORY OF ISCHEMIC HEART DISEASE AND OTHER DISEASES OF THE CIRCULATORY SYSTEM: ICD-10-CM

## 2018-06-12 DIAGNOSIS — N20.0 CALCULUS OF KIDNEY: ICD-10-CM

## 2018-06-12 DIAGNOSIS — Z83.49 FAMILY HISTORY OF OTHER ENDOCRINE, NUTRITIONAL AND METABOLIC DISEASES: ICD-10-CM

## 2018-06-12 LAB
BILIRUB UR QL STRIP: NORMAL
CLARITY UR: CLEAR
COLLECTION METHOD: NORMAL
GLUCOSE UR-MCNC: NORMAL
HCG UR QL: 0.2 EU/DL
HGB UR QL STRIP.AUTO: NORMAL
KETONES UR-MCNC: NORMAL
LEUKOCYTE ESTERASE UR QL STRIP: NORMAL
NITRITE UR QL STRIP: NORMAL
PH UR STRIP: 7
PROT UR STRIP-MCNC: NORMAL
SP GR UR STRIP: 1.01

## 2018-06-12 PROCEDURE — 81003 URINALYSIS AUTO W/O SCOPE: CPT | Mod: QW

## 2018-06-12 PROCEDURE — 99214 OFFICE O/P EST MOD 30 MIN: CPT | Mod: 25

## 2018-06-12 NOTE — PLAN
[FreeTextEntry1] : Advised pt to stop smoking. If sx not improved with inhalers, will RTO for RX CXR

## 2018-06-12 NOTE — REVIEW OF SYSTEMS
[Fatigue] : fatigue [Negative] : Heme/Lymph [Wheezing] : wheezing [Cough] : cough [Shortness Of Breath] : no shortness of breath [Dyspnea on Exertion] : no dyspnea on exertion

## 2018-06-12 NOTE — HISTORY OF PRESENT ILLNESS
[FreeTextEntry1] : fbw\par "wants urine tested for kidney stones"\par no refills needed [de-identified] : productive cough x 2-3 weeks with clear sputum. pt requests refill of inhaler, still smoking. Last CXR Oct 2016 nml\par Wants urine checked due to h/o renal stones\par + fatigue, pt on Synthroid 88mcg

## 2018-06-13 LAB
ALBUMIN SERPL ELPH-MCNC: 4.5 G/DL
ALP BLD-CCNC: 90 U/L
ALT SERPL-CCNC: 13 U/L
ANION GAP SERPL CALC-SCNC: 15 MMOL/L
AST SERPL-CCNC: 24 U/L
BASOPHILS # BLD AUTO: 0.03 K/UL
BASOPHILS NFR BLD AUTO: 0.3 %
BILIRUB SERPL-MCNC: 0.2 MG/DL
BUN SERPL-MCNC: 13 MG/DL
CALCIUM SERPL-MCNC: 10 MG/DL
CHLORIDE SERPL-SCNC: 102 MMOL/L
CHOLEST SERPL-MCNC: 188 MG/DL
CHOLEST/HDLC SERPL: 3.3 RATIO
CO2 SERPL-SCNC: 25 MMOL/L
CREAT SERPL-MCNC: 0.72 MG/DL
EOSINOPHIL # BLD AUTO: 0.14 K/UL
EOSINOPHIL NFR BLD AUTO: 1.3 %
GLUCOSE SERPL-MCNC: 92 MG/DL
HCT VFR BLD CALC: 40.9 %
HDLC SERPL-MCNC: 57 MG/DL
HGB BLD-MCNC: 13.3 G/DL
IMM GRANULOCYTES NFR BLD AUTO: 0.3 %
LDLC SERPL CALC-MCNC: 104 MG/DL
LYMPHOCYTES # BLD AUTO: 2.47 K/UL
LYMPHOCYTES NFR BLD AUTO: 23.7 %
MAN DIFF?: NORMAL
MCHC RBC-ENTMCNC: 29.6 PG
MCHC RBC-ENTMCNC: 32.5 GM/DL
MCV RBC AUTO: 91.1 FL
MONOCYTES # BLD AUTO: 0.84 K/UL
MONOCYTES NFR BLD AUTO: 8 %
NEUTROPHILS # BLD AUTO: 6.93 K/UL
NEUTROPHILS NFR BLD AUTO: 66.4 %
PLATELET # BLD AUTO: 626 K/UL
POTASSIUM SERPL-SCNC: 5.5 MMOL/L
PROT SERPL-MCNC: 7.6 G/DL
RBC # BLD: 4.49 M/UL
RBC # FLD: 14.4 %
SODIUM SERPL-SCNC: 142 MMOL/L
TRIGL SERPL-MCNC: 133 MG/DL
TSH SERPL-ACNC: 4.28 UIU/ML
WBC # FLD AUTO: 10.44 K/UL

## 2018-06-19 ENCOUNTER — RX RENEWAL (OUTPATIENT)
Age: 63
End: 2018-06-19

## 2018-08-07 ENCOUNTER — RX RENEWAL (OUTPATIENT)
Age: 63
End: 2018-08-07

## 2018-08-08 ENCOUNTER — RX RENEWAL (OUTPATIENT)
Age: 63
End: 2018-08-08

## 2018-08-16 ENCOUNTER — RX RENEWAL (OUTPATIENT)
Age: 63
End: 2018-08-16

## 2018-08-30 ENCOUNTER — RX RENEWAL (OUTPATIENT)
Age: 63
End: 2018-08-30

## 2018-09-04 ENCOUNTER — RX RENEWAL (OUTPATIENT)
Age: 63
End: 2018-09-04

## 2018-11-02 ENCOUNTER — APPOINTMENT (OUTPATIENT)
Dept: FAMILY MEDICINE | Facility: CLINIC | Age: 63
End: 2018-11-02
Payer: COMMERCIAL

## 2018-11-02 VITALS
WEIGHT: 117 LBS | OXYGEN SATURATION: 98 % | SYSTOLIC BLOOD PRESSURE: 140 MMHG | HEART RATE: 83 BPM | DIASTOLIC BLOOD PRESSURE: 72 MMHG | HEIGHT: 63 IN | TEMPERATURE: 98.7 F | RESPIRATION RATE: 14 BRPM | BODY MASS INDEX: 20.73 KG/M2

## 2018-11-02 DIAGNOSIS — G89.29 CERVICALGIA: ICD-10-CM

## 2018-11-02 DIAGNOSIS — M54.2 CERVICALGIA: ICD-10-CM

## 2018-11-02 PROBLEM — F32.9 MAJOR DEPRESSIVE DISORDER, SINGLE EPISODE, UNSPECIFIED: Chronic | Status: ACTIVE | Noted: 2017-10-20

## 2018-11-02 PROBLEM — E05.00 THYROTOXICOSIS WITH DIFFUSE GOITER WITHOUT THYROTOXIC CRISIS OR STORM: Chronic | Status: ACTIVE | Noted: 2017-10-20

## 2018-11-02 PROBLEM — E78.5 HYPERLIPIDEMIA, UNSPECIFIED: Chronic | Status: ACTIVE | Noted: 2017-10-20

## 2018-11-02 PROBLEM — I10 ESSENTIAL (PRIMARY) HYPERTENSION: Chronic | Status: ACTIVE | Noted: 2017-10-20

## 2018-11-02 PROCEDURE — 36415 COLL VENOUS BLD VENIPUNCTURE: CPT

## 2018-11-02 PROCEDURE — 99214 OFFICE O/P EST MOD 30 MIN: CPT | Mod: 25

## 2018-11-02 RX ORDER — SERTRALINE HYDROCHLORIDE 100 MG/1
100 TABLET, FILM COATED ORAL
Qty: 90 | Refills: 0 | Status: COMPLETED | COMMUNITY
Start: 2017-07-28 | End: 2018-08-07

## 2018-11-02 RX ORDER — LEVOTHYROXINE SODIUM 0.09 MG/1
88 TABLET ORAL DAILY
Qty: 90 | Refills: 0 | Status: COMPLETED | COMMUNITY
Start: 2017-07-28 | End: 2018-08-16

## 2018-11-02 RX ORDER — ATORVASTATIN CALCIUM 40 MG/1
40 TABLET, FILM COATED ORAL
Refills: 0 | Status: COMPLETED | COMMUNITY
End: 2018-10-25

## 2018-11-02 NOTE — PLAN
[FreeTextEntry1] : Blood work obtained. Rx prednisone. Encourage heating pad. Discussed trigger point injections. Pt will call if sy/sx do not improve in the next few days.

## 2018-11-02 NOTE — HISTORY OF PRESENT ILLNESS
[FreeTextEntry1] : Pt c/o stiff neck x2 days, Right knee pain & swelling that has gotten worse over the past week. \par Rx refill on Rosuvastatin.  [de-identified] : Pt states she fell asleep on the couch and the next day she was unable to turn her head. Pt states she has neck pain. Pt denies numbness and weakness.

## 2018-11-02 NOTE — PHYSICAL EXAM
[No Acute Distress] : no acute distress [Well Nourished] : well nourished [Well Developed] : well developed [Well-Appearing] : well-appearing [Normal Sclera/Conjunctiva] : normal sclera/conjunctiva [PERRL] : pupils equal round and reactive to light [EOMI] : extraocular movements intact [Normal Outer Ear/Nose] : the outer ears and nose were normal in appearance [Normal Oropharynx] : the oropharynx was normal [Normal TMs] : both tympanic membranes were normal [Normal Nasal Mucosa] : the nasal mucosa was normal [No JVD] : no jugular venous distention [Supple] : supple [No Lymphadenopathy] : no lymphadenopathy [Thyroid Normal, No Nodules] : the thyroid was normal and there were no nodules present [Diffuse Wheezing] : diffuse wheezing was heard [Normal Rate] : normal rate  [Regular Rhythm] : with a regular rhythm [Normal S1, S2] : normal S1 and S2 [No Murmur] : no murmur heard [Soft] : abdomen soft [Non Tender] : non-tender [Non-distended] : non-distended [No Masses] : no abdominal mass palpated [Normal Posterior Cervical Nodes] : no posterior cervical lymphadenopathy [Normal Anterior Cervical Nodes] : no anterior cervical lymphadenopathy [No Rash] : no rash [Normal Gait] : normal gait [Coordination Grossly Intact] : coordination grossly intact [No Focal Deficits] : no focal deficits [Deep Tendon Reflexes (DTR)] : deep tendon reflexes were 2+ and symmetric [Normal Affect] : the affect was normal [Normal Insight/Judgement] : insight and judgment were intact [de-identified] : tender cervical spine [de-identified] : Hypertonic b/l trapezius. Right knee swelling and right foot swelling.

## 2018-11-02 NOTE — REVIEW OF SYSTEMS
[Joint Pain] : joint pain [Joint Stiffness] : joint stiffness [Joint Swelling] : joint swelling [Muscle Pain] : muscle pain [Back Pain] : back pain [Negative] : Heme/Lymph

## 2018-11-08 LAB
ALBUMIN SERPL ELPH-MCNC: 4.5 G/DL
ALP BLD-CCNC: 93 U/L
ALT SERPL-CCNC: 13 U/L
ANA SER IF-ACNC: NEGATIVE
ANION GAP SERPL CALC-SCNC: 14 MMOL/L
AST SERPL-CCNC: 22 U/L
B BURGDOR AB SER-IMP: NEGATIVE
B BURGDOR IGM PATRN SER IB-IMP: NEGATIVE
B BURGDOR18/20KD IGM SER QL IB: NORMAL
B BURGDOR18KD IGG SER QL IB: NORMAL
B BURGDOR23KD IGG SER QL IB: NORMAL
B BURGDOR23KD IGM SER QL IB: NORMAL
B BURGDOR28KD AB SER QL IB: NORMAL
B BURGDOR28KD IGG SER QL IB: NORMAL
B BURGDOR30KD AB SER QL IB: NORMAL
B BURGDOR30KD IGG SER QL IB: NORMAL
B BURGDOR31KD IGG SER QL IB: NORMAL
B BURGDOR31KD IGM SER QL IB: NORMAL
B BURGDOR39KD IGG SER QL IB: NORMAL
B BURGDOR39KD IGM SER QL IB: NORMAL
B BURGDOR41KD IGG SER QL IB: NORMAL
B BURGDOR41KD IGM SER QL IB: NORMAL
B BURGDOR45KD AB SER QL IB: NORMAL
B BURGDOR45KD IGG SER QL IB: NORMAL
B BURGDOR58KD AB SER QL IB: NORMAL
B BURGDOR58KD IGG SER QL IB: NORMAL
B BURGDOR66KD IGG SER QL IB: NORMAL
B BURGDOR66KD IGM SER QL IB: NORMAL
B BURGDOR93KD IGG SER QL IB: NORMAL
B BURGDOR93KD IGM SER QL IB: NORMAL
BASOPHILS # BLD AUTO: 0.01 K/UL
BASOPHILS NFR BLD AUTO: 0.1 %
BILIRUB SERPL-MCNC: 0.3 MG/DL
BUN SERPL-MCNC: 8 MG/DL
CALCIUM SERPL-MCNC: 9.6 MG/DL
CCP AB SER IA-ACNC: <8 UNITS
CHLORIDE SERPL-SCNC: 100 MMOL/L
CHOLEST SERPL-MCNC: 267 MG/DL
CHOLEST/HDLC SERPL: 4.7 RATIO
CO2 SERPL-SCNC: 26 MMOL/L
CREAT SERPL-MCNC: 0.67 MG/DL
CRP SERPL-MCNC: 3.68 MG/DL
EOSINOPHIL # BLD AUTO: 0.11 K/UL
EOSINOPHIL NFR BLD AUTO: 1 %
ERYTHROCYTE [SEDIMENTATION RATE] IN BLOOD BY WESTERGREN METHOD: 41 MM/HR
GLUCOSE SERPL-MCNC: 104 MG/DL
HBA1C MFR BLD HPLC: 5.9 %
HCT VFR BLD CALC: 40 %
HDLC SERPL-MCNC: 57 MG/DL
HGB BLD-MCNC: 12.9 G/DL
HLA-B27 RELATED AG QL: NORMAL
IMM GRANULOCYTES NFR BLD AUTO: 0.3 %
LDLC SERPL CALC-MCNC: 184 MG/DL
LYMPHOCYTES # BLD AUTO: 2.05 K/UL
LYMPHOCYTES NFR BLD AUTO: 18.6 %
MAN DIFF?: NORMAL
MCHC RBC-ENTMCNC: 28.9 PG
MCHC RBC-ENTMCNC: 32.3 GM/DL
MCV RBC AUTO: 89.5 FL
MONOCYTES # BLD AUTO: 1.34 K/UL
MONOCYTES NFR BLD AUTO: 12.2 %
NEUTROPHILS # BLD AUTO: 7.46 K/UL
NEUTROPHILS NFR BLD AUTO: 67.8 %
PLATELET # BLD AUTO: 451 K/UL
POTASSIUM SERPL-SCNC: 5 MMOL/L
PROT SERPL-MCNC: 7.2 G/DL
RBC # BLD: 4.47 M/UL
RBC # FLD: 14.8 %
RF+CCP IGG SER-IMP: NEGATIVE
RHEUMATOID FACT SER QL: <10 IU/ML
SODIUM SERPL-SCNC: 140 MMOL/L
T3 SERPL-MCNC: 105 NG/DL
T4 SERPL-MCNC: 8.8 UG/DL
TRIGL SERPL-MCNC: 128 MG/DL
TSH SERPL-ACNC: 1.97 UIU/ML
URATE SERPL-MCNC: 3.7 MG/DL
WBC # FLD AUTO: 11 K/UL

## 2018-11-12 ENCOUNTER — APPOINTMENT (OUTPATIENT)
Dept: FAMILY MEDICINE | Facility: CLINIC | Age: 63
End: 2018-11-12
Payer: COMMERCIAL

## 2018-11-12 VITALS
BODY MASS INDEX: 20.73 KG/M2 | DIASTOLIC BLOOD PRESSURE: 72 MMHG | WEIGHT: 117 LBS | HEIGHT: 63 IN | RESPIRATION RATE: 14 BRPM | TEMPERATURE: 98.2 F | SYSTOLIC BLOOD PRESSURE: 140 MMHG | HEART RATE: 75 BPM | OXYGEN SATURATION: 98 %

## 2018-11-12 DIAGNOSIS — M25.50 PAIN IN UNSPECIFIED JOINT: ICD-10-CM

## 2018-11-12 PROCEDURE — 90715 TDAP VACCINE 7 YRS/> IM: CPT

## 2018-11-12 PROCEDURE — 90471 IMMUNIZATION ADMIN: CPT

## 2018-11-12 PROCEDURE — 99214 OFFICE O/P EST MOD 30 MIN: CPT | Mod: 25

## 2018-11-12 RX ORDER — PREDNISONE 20 MG/1
20 TABLET ORAL TWICE DAILY
Qty: 10 | Refills: 0 | Status: COMPLETED | COMMUNITY
Start: 2018-11-02 | End: 2018-11-09

## 2018-11-12 NOTE — HISTORY OF PRESENT ILLNESS
[FreeTextEntry1] : F/u on Bloodwork. \par Rx refill on Rosuvastatin.\par  [de-identified] : Pt states her neck pain and knee pain has greatly resolved. Pt states current pain is a 3-4/10. Pt here to review blood work. Current cholesterol is elevated. Pt would like to go back on crestor.

## 2018-11-12 NOTE — HEALTH RISK ASSESSMENT
[No falls in past year] : Patient reported no falls in the past year [1] : 2) Feeling down, depressed, or hopeless for several days (1) [] : No [de-identified] : Social  [[id number]] : 2

## 2018-11-12 NOTE — PHYSICAL EXAM
[No Acute Distress] : no acute distress [Well Nourished] : well nourished [Well Developed] : well developed [Well-Appearing] : well-appearing [Normal Sclera/Conjunctiva] : normal sclera/conjunctiva [PERRL] : pupils equal round and reactive to light [EOMI] : extraocular movements intact [Normal Outer Ear/Nose] : the outer ears and nose were normal in appearance [Normal Oropharynx] : the oropharynx was normal [Normal TMs] : both tympanic membranes were normal [Normal Nasal Mucosa] : the nasal mucosa was normal [No JVD] : no jugular venous distention [Supple] : supple [No Lymphadenopathy] : no lymphadenopathy [Thyroid Normal, No Nodules] : the thyroid was normal and there were no nodules present [Diffuse Wheezing] : diffuse wheezing was heard [Normal Rate] : normal rate  [Regular Rhythm] : with a regular rhythm [Normal S1, S2] : normal S1 and S2 [No Murmur] : no murmur heard [Soft] : abdomen soft [Non Tender] : non-tender [Non-distended] : non-distended [No Masses] : no abdominal mass palpated [Normal Posterior Cervical Nodes] : no posterior cervical lymphadenopathy [Normal Anterior Cervical Nodes] : no anterior cervical lymphadenopathy [No Rash] : no rash [Normal Gait] : normal gait [Coordination Grossly Intact] : coordination grossly intact [No Focal Deficits] : no focal deficits [Deep Tendon Reflexes (DTR)] : deep tendon reflexes were 2+ and symmetric [Normal Affect] : the affect was normal [Normal Insight/Judgement] : insight and judgment were intact [de-identified] : tender cervical spine [de-identified] :  Sl Hypertonic b/l trapezius .

## 2018-11-13 ENCOUNTER — RX RENEWAL (OUTPATIENT)
Age: 63
End: 2018-11-13

## 2018-11-14 ENCOUNTER — RX RENEWAL (OUTPATIENT)
Age: 63
End: 2018-11-14

## 2018-11-15 ENCOUNTER — RX RENEWAL (OUTPATIENT)
Age: 63
End: 2018-11-15

## 2018-11-28 ENCOUNTER — APPOINTMENT (OUTPATIENT)
Dept: UROLOGY | Facility: CLINIC | Age: 63
End: 2018-11-28

## 2018-12-03 ENCOUNTER — RX RENEWAL (OUTPATIENT)
Age: 63
End: 2018-12-03

## 2019-02-14 ENCOUNTER — RX RENEWAL (OUTPATIENT)
Age: 64
End: 2019-02-14

## 2019-02-25 ENCOUNTER — OTHER (OUTPATIENT)
Age: 64
End: 2019-02-25

## 2019-03-12 NOTE — H&P PST ADULT - RESPIRATORY
"Requested Prescriptions   Pending Prescriptions Disp Refills     buPROPion (WELLBUTRIN XL) 300 MG 24 hr tablet [Pharmacy Med Name: BUPROPION HCL  MG TABLET] 90 tablet 0     Sig: TAKE 1 TABLET BY MOUTH EVERY MORNING    SSRIs Protocol Passed - 3/11/2019 11:07 AM       Passed - PHQ-9 score less than 5 in past 6 months    Please review last PHQ-9 score.     PHQ-9 SCORE 8/10/2018 3/9/2019 3/9/2019   PHQ-9 Total Score - - -   PHQ-9 Total Score MyChart 4 (Minimal depression) 3 (Minimal depression) 3 (Minimal depression)   PHQ-9 Total Score 4 3 3              Passed - Medication is Bupropion    If the medication is Bupropion (Wellbutrin), and the patient is taking for smoking cessation; OK to refill.         Passed - Medication is active on med list       Passed - Patient is age 18 or older       Passed - Recent (6 mo) or future (30 days) visit within the authorizing provider's specialty    Patient had office visit in the last 6 months or has a visit in the next 30 days with authorizing provider or within the authorizing provider's specialty.  See \"Patient Info\" tab in inbasket, or \"Choose Columns\" in Meds & Orders section of the refill encounter.            Refused Prescriptions:                       Disp   Refills    buPROPion (WELLBUTRIN XL) 300 MG 24 hr tab*0.01 t*0        Sig: TAKE 1 TABLET BY MOUTH EVERY MORNING  Refused By: PAMELA NELSON  Reason for Refusal: Duplicate      Closing encounter - no further actions needed at this time    Pamela Nelson RN    " Breath Sounds equal & clear to percussion & auscultation, no accessory muscle use

## 2019-04-02 ENCOUNTER — RX RENEWAL (OUTPATIENT)
Age: 64
End: 2019-04-02

## 2019-04-03 ENCOUNTER — LABORATORY RESULT (OUTPATIENT)
Age: 64
End: 2019-04-03

## 2019-04-03 ENCOUNTER — APPOINTMENT (OUTPATIENT)
Dept: FAMILY MEDICINE | Facility: CLINIC | Age: 64
End: 2019-04-03
Payer: MEDICARE

## 2019-04-03 VITALS
HEART RATE: 76 BPM | RESPIRATION RATE: 16 BRPM | SYSTOLIC BLOOD PRESSURE: 158 MMHG | TEMPERATURE: 98.1 F | WEIGHT: 108 LBS | BODY MASS INDEX: 19.14 KG/M2 | DIASTOLIC BLOOD PRESSURE: 72 MMHG | OXYGEN SATURATION: 96 % | HEIGHT: 63 IN

## 2019-04-03 PROCEDURE — 99214 OFFICE O/P EST MOD 30 MIN: CPT | Mod: 25

## 2019-04-03 PROCEDURE — 81003 URINALYSIS AUTO W/O SCOPE: CPT | Mod: QW

## 2019-04-03 PROCEDURE — 99406 BEHAV CHNG SMOKING 3-10 MIN: CPT

## 2019-04-03 PROCEDURE — 36415 COLL VENOUS BLD VENIPUNCTURE: CPT

## 2019-04-03 RX ORDER — ALBUTEROL SULFATE 90 UG/1
108 (90 BASE) AEROSOL, METERED RESPIRATORY (INHALATION) EVERY 6 HOURS
Qty: 3 | Refills: 0 | Status: DISCONTINUED | COMMUNITY
Start: 2018-06-12 | End: 2019-04-03

## 2019-04-03 RX ORDER — POTASSIUM CITRATE 15 MEQ/1
15 MEQ TABLET, EXTENDED RELEASE ORAL
Qty: 360 | Refills: 3 | Status: DISCONTINUED | COMMUNITY
Start: 2017-11-03 | End: 2019-04-03

## 2019-04-03 RX ORDER — BUDESONIDE AND FORMOTEROL FUMARATE DIHYDRATE 160; 4.5 UG/1; UG/1
160-4.5 AEROSOL RESPIRATORY (INHALATION) TWICE DAILY
Qty: 3 | Refills: 0 | Status: DISCONTINUED | COMMUNITY
Start: 2018-06-12 | End: 2019-04-03

## 2019-04-03 NOTE — PLAN
[FreeTextEntry1] : Rx renewed as above.\par Blood work obtained.\par Will call with results of blood work.\par APRIL will follow up with mammo and low dose CT for lung cancer screening and nodule revaluation. \par APRIL will RTO in 3 months.

## 2019-04-03 NOTE — COUNSELING
[Discussed Risks and Advised to Quit Smoking] : Discussed risks and advised to quit smoking [Discussed Cessation Medication] : cessation medication was discussed [Quit Smoking] : Quit Smoking [Tobacco Use Cessation Intermediate Greater Than 3 Minutes Up to 10 Minutes] : Tobacco Use Cessation Intermediate Greater Than 3 Minutes Up to 10 Minutes

## 2019-04-03 NOTE — HISTORY OF PRESENT ILLNESS
[FreeTextEntry1] : Pt is here for lower back pain, had kidneystone, Rx Renewal: Levothyroxine 100 MCG, Amlodipine 10 MG, Sertraline 100MG\par [facility] [place]

## 2019-04-03 NOTE — PHYSICAL EXAM
[No Acute Distress] : no acute distress [Well Developed] : well developed [Well-Appearing] : well-appearing [Cachectic] : cachexia was observed [Normal Sclera/Conjunctiva] : normal sclera/conjunctiva [PERRL] : pupils equal round and reactive to light [EOMI] : extraocular movements intact [Fundoscopic Exam Performed] : fundoscopic ~T exam ~C was performed [Normal Outer Ear/Nose] : the outer ears and nose were normal in appearance [Normal Oropharynx] : the oropharynx was normal [Normal TMs] : both tympanic membranes were normal [Normal Nasal Mucosa] : the nasal mucosa was normal [No JVD] : no jugular venous distention [Supple] : supple [No Lymphadenopathy] : no lymphadenopathy [Diffuse Wheezing] : diffuse wheezing was heard [Normal Rate] : normal rate  [Regular Rhythm] : with a regular rhythm [Normal S1, S2] : normal S1 and S2 [No Murmur] : no murmur heard [Soft] : abdomen soft [Non Tender] : non-tender [Non-distended] : non-distended [Normal Posterior Cervical Nodes] : no posterior cervical lymphadenopathy [Normal Anterior Cervical Nodes] : no anterior cervical lymphadenopathy [No Rash] : no rash [Normal Gait] : normal gait [Coordination Grossly Intact] : coordination grossly intact [No Focal Deficits] : no focal deficits [Deep Tendon Reflexes (DTR)] : deep tendon reflexes were 2+ and symmetric [Normal Affect] : the affect was normal [Normal Insight/Judgement] : insight and judgment were intact [de-identified] :  .

## 2019-04-08 LAB
ALBUMIN SERPL ELPH-MCNC: 4.5 G/DL
ALP BLD-CCNC: 78 U/L
ALT SERPL-CCNC: 15 U/L
ANION GAP SERPL CALC-SCNC: 14 MMOL/L
AST SERPL-CCNC: 27 U/L
BASOPHILS # BLD AUTO: 0.04 K/UL
BASOPHILS NFR BLD AUTO: 0.5 %
BILIRUB SERPL-MCNC: 0.2 MG/DL
BUN SERPL-MCNC: 14 MG/DL
CALCIUM SERPL-MCNC: 9.6 MG/DL
CHLORIDE SERPL-SCNC: 101 MMOL/L
CHOLEST SERPL-MCNC: 300 MG/DL
CHOLEST/HDLC SERPL: 4.6 RATIO
CO2 SERPL-SCNC: 25 MMOL/L
CREAT SERPL-MCNC: 0.68 MG/DL
EOSINOPHIL # BLD AUTO: 0.14 K/UL
EOSINOPHIL NFR BLD AUTO: 1.8 %
ESTIMATED AVERAGE GLUCOSE: 120 MG/DL
GLUCOSE SERPL-MCNC: 86 MG/DL
HBA1C MFR BLD HPLC: 5.8 %
HCT VFR BLD CALC: 41 %
HDLC SERPL-MCNC: 66 MG/DL
HGB BLD-MCNC: 13 G/DL
IMM GRANULOCYTES NFR BLD AUTO: 0.3 %
LDLC SERPL CALC-MCNC: 204 MG/DL
LYMPHOCYTES # BLD AUTO: 2.52 K/UL
LYMPHOCYTES NFR BLD AUTO: 31.7 %
MAN DIFF?: NORMAL
MCHC RBC-ENTMCNC: 28.8 PG
MCHC RBC-ENTMCNC: 31.7 GM/DL
MCV RBC AUTO: 90.7 FL
MONOCYTES # BLD AUTO: 0.71 K/UL
MONOCYTES NFR BLD AUTO: 8.9 %
NEUTROPHILS # BLD AUTO: 4.51 K/UL
NEUTROPHILS NFR BLD AUTO: 56.8 %
PLATELET # BLD AUTO: 424 K/UL
POTASSIUM SERPL-SCNC: 4.4 MMOL/L
PROT SERPL-MCNC: 7.5 G/DL
RBC # BLD: 4.52 M/UL
RBC # FLD: 14.6 %
SODIUM SERPL-SCNC: 140 MMOL/L
TRIGL SERPL-MCNC: 150 MG/DL
TSH SERPL-ACNC: 15.4 UIU/ML
WBC # FLD AUTO: 7.94 K/UL

## 2019-04-17 LAB — HEMOCCULT STL QL IA: NEGATIVE

## 2019-04-24 ENCOUNTER — APPOINTMENT (OUTPATIENT)
Dept: MAMMOGRAPHY | Facility: CLINIC | Age: 64
End: 2019-04-24
Payer: MEDICARE

## 2019-04-24 PROCEDURE — 77067 SCR MAMMO BI INCL CAD: CPT

## 2019-04-24 PROCEDURE — 77063 BREAST TOMOSYNTHESIS BI: CPT

## 2019-06-03 ENCOUNTER — RX RENEWAL (OUTPATIENT)
Age: 64
End: 2019-06-03

## 2019-06-04 ENCOUNTER — RX RENEWAL (OUTPATIENT)
Age: 64
End: 2019-06-04

## 2019-06-14 ENCOUNTER — APPOINTMENT (OUTPATIENT)
Dept: FAMILY MEDICINE | Facility: CLINIC | Age: 64
End: 2019-06-14
Payer: MEDICARE

## 2019-06-14 VITALS
DIASTOLIC BLOOD PRESSURE: 66 MMHG | RESPIRATION RATE: 15 BRPM | HEART RATE: 75 BPM | OXYGEN SATURATION: 96 % | BODY MASS INDEX: 19.67 KG/M2 | SYSTOLIC BLOOD PRESSURE: 128 MMHG | HEIGHT: 63 IN | WEIGHT: 111 LBS | TEMPERATURE: 98.4 F

## 2019-06-14 DIAGNOSIS — Z87.09 PERSONAL HISTORY OF OTHER DISEASES OF THE RESPIRATORY SYSTEM: ICD-10-CM

## 2019-06-14 DIAGNOSIS — R05 COUGH: ICD-10-CM

## 2019-06-14 LAB
FLUAV SPEC QL CULT: NORMAL
FLUBV AG SPEC QL IA: NORMAL
S PYO AG SPEC QL IA: NORMAL

## 2019-06-14 PROCEDURE — 94640 AIRWAY INHALATION TREATMENT: CPT

## 2019-06-14 PROCEDURE — 87804 INFLUENZA ASSAY W/OPTIC: CPT | Mod: QW

## 2019-06-14 PROCEDURE — 87880 STREP A ASSAY W/OPTIC: CPT | Mod: QW

## 2019-06-14 PROCEDURE — 99213 OFFICE O/P EST LOW 20 MIN: CPT | Mod: 25

## 2019-06-14 NOTE — REVIEW OF SYSTEMS
[Fever] : fever [Chills] : chills [Fatigue] : fatigue [Sore Throat] : sore throat [Cough] : cough [Negative] : Heme/Lymph [FreeTextEntry6] : chest congestion

## 2019-06-14 NOTE — PLAN
[FreeTextEntry1] : Rx zithromycin 250 mg z pack 2 po on day 1 and 1 a day for 4 days.\par APRIL will f/u with CXR. \par Neb treatment given. APRIL states she is able to get more mucus out after treatment. \par APRIL will call if sy/sx do not improve.\par

## 2019-06-14 NOTE — HISTORY OF PRESENT ILLNESS
[FreeTextEntry8] : Patient not feeling well x 1 week; worsening symptoms\par patient c/o scratchy throat, cough, sinuses pressure, chest congestion, fever x 3 days, body aches, and chills

## 2019-06-14 NOTE — PHYSICAL EXAM
[No Acute Distress] : no acute distress [Well-Appearing] : well-appearing [Well Developed] : well developed [Cachectic] : cachexia was observed [Normal Sclera/Conjunctiva] : normal sclera/conjunctiva [EOMI] : extraocular movements intact [PERRL] : pupils equal round and reactive to light [Fundoscopic Exam Performed] : fundoscopic ~T exam ~C was performed [Normal Outer Ear/Nose] : the outer ears and nose were normal in appearance [Normal Oropharynx] : the oropharynx was normal [Normal TMs] : both tympanic membranes were normal [Normal Nasal Mucosa] : the nasal mucosa was normal [Supple] : supple [No JVD] : no jugular venous distention [No Lymphadenopathy] : no lymphadenopathy [Diffuse Wheezing] : diffuse wheezing was heard [Rhonchi Bilateral Bases] : rhonchi were heard over both bases [Normal Rate] : normal rate  [Normal S1, S2] : normal S1 and S2 [Regular Rhythm] : with a regular rhythm [No Murmur] : no murmur heard [Soft] : abdomen soft [Non Tender] : non-tender [Normal Posterior Cervical Nodes] : no posterior cervical lymphadenopathy [Non-distended] : non-distended [Normal Anterior Cervical Nodes] : no anterior cervical lymphadenopathy [No Rash] : no rash [Normal Gait] : normal gait [Coordination Grossly Intact] : coordination grossly intact [No Focal Deficits] : no focal deficits [Deep Tendon Reflexes (DTR)] : deep tendon reflexes were 2+ and symmetric [Normal Affect] : the affect was normal [Normal Insight/Judgement] : insight and judgment were intact [de-identified] :  .

## 2019-06-19 ENCOUNTER — RX RENEWAL (OUTPATIENT)
Age: 64
End: 2019-06-19

## 2019-06-26 ENCOUNTER — APPOINTMENT (OUTPATIENT)
Dept: FAMILY MEDICINE | Facility: CLINIC | Age: 64
End: 2019-06-26
Payer: MEDICARE

## 2019-06-26 VITALS
BODY MASS INDEX: 19.49 KG/M2 | DIASTOLIC BLOOD PRESSURE: 68 MMHG | HEIGHT: 63 IN | RESPIRATION RATE: 16 BRPM | OXYGEN SATURATION: 97 % | HEART RATE: 72 BPM | TEMPERATURE: 98.6 F | WEIGHT: 110 LBS | SYSTOLIC BLOOD PRESSURE: 130 MMHG

## 2019-06-26 DIAGNOSIS — Z11.59 ENCOUNTER FOR SCREENING FOR OTHER VIRAL DISEASES: ICD-10-CM

## 2019-06-26 PROCEDURE — 90670 PCV13 VACCINE IM: CPT

## 2019-06-26 PROCEDURE — 90471 IMMUNIZATION ADMIN: CPT

## 2019-06-26 PROCEDURE — 36415 COLL VENOUS BLD VENIPUNCTURE: CPT

## 2019-06-26 PROCEDURE — 99214 OFFICE O/P EST MOD 30 MIN: CPT | Mod: 25

## 2019-06-26 RX ORDER — AZITHROMYCIN 250 MG/1
250 TABLET, FILM COATED ORAL
Qty: 1 | Refills: 0 | Status: DISCONTINUED | COMMUNITY
Start: 2019-06-14 | End: 2019-06-26

## 2019-06-26 NOTE — PHYSICAL EXAM
[No Acute Distress] : no acute distress [Well Developed] : well developed [Well-Appearing] : well-appearing [Cachectic] : cachexia was observed [Normal Sclera/Conjunctiva] : normal sclera/conjunctiva [PERRL] : pupils equal round and reactive to light [EOMI] : extraocular movements intact [Fundoscopic Exam Performed] : fundoscopic ~T exam ~C was performed [Normal Outer Ear/Nose] : the outer ears and nose were normal in appearance [Normal Oropharynx] : the oropharynx was normal [Normal TMs] : both tympanic membranes were normal [Normal Nasal Mucosa] : the nasal mucosa was normal [No JVD] : no jugular venous distention [Supple] : supple [No Lymphadenopathy] : no lymphadenopathy [Normal Rhythm/Effort] : normal respiratory rhythm and effort [Clear Bilaterally] : the lungs were clear to auscultation bilaterally [Normal Rate] : normal rate  [Regular Rhythm] : with a regular rhythm [Normal S1, S2] : normal S1 and S2 [No Murmur] : no murmur heard [Soft] : abdomen soft [Non Tender] : non-tender [Non-distended] : non-distended [Normal Posterior Cervical Nodes] : no posterior cervical lymphadenopathy [No Rash] : no rash [Normal Anterior Cervical Nodes] : no anterior cervical lymphadenopathy [Coordination Grossly Intact] : coordination grossly intact [Normal Gait] : normal gait [No Focal Deficits] : no focal deficits [Deep Tendon Reflexes (DTR)] : deep tendon reflexes were 2+ and symmetric [Normal Affect] : the affect was normal [de-identified] :  .  [Normal Insight/Judgement] : insight and judgment were intact

## 2019-06-26 NOTE — HISTORY OF PRESENT ILLNESS
[FreeTextEntry1] : f/u - recent pneumonia, needs note to RTW, APRIL presents with form to be completed by provider\par \par [facility]

## 2019-06-26 NOTE — PLAN
[FreeTextEntry1] : APRIL is medically cleared to return to work without limitations. \par Pneumo 13 vac given. \par Blood work obtained.\par Will call with results of blood work.\par

## 2019-07-05 LAB
ALBUMIN SERPL ELPH-MCNC: 4.2 G/DL
ALP BLD-CCNC: 70 U/L
ALT SERPL-CCNC: 21 U/L
ANION GAP SERPL CALC-SCNC: 14 MMOL/L
AST SERPL-CCNC: 23 U/L
BASOPHILS # BLD AUTO: 0.06 K/UL
BASOPHILS NFR BLD AUTO: 0.4 %
BILIRUB SERPL-MCNC: 0.2 MG/DL
BUN SERPL-MCNC: 13 MG/DL
CALCIUM SERPL-MCNC: 9.6 MG/DL
CHLORIDE SERPL-SCNC: 101 MMOL/L
CHOLEST SERPL-MCNC: 212 MG/DL
CHOLEST/HDLC SERPL: 4 RATIO
CO2 SERPL-SCNC: 26 MMOL/L
CREAT SERPL-MCNC: 0.66 MG/DL
EOSINOPHIL # BLD AUTO: 0.23 K/UL
EOSINOPHIL NFR BLD AUTO: 1.7 %
ESTIMATED AVERAGE GLUCOSE: 126 MG/DL
GLUCOSE SERPL-MCNC: 95 MG/DL
HBA1C MFR BLD HPLC: 6 %
HCT VFR BLD CALC: 41.9 %
HCV AB SER QL: NONREACTIVE
HCV S/CO RATIO: 0.21 S/CO
HDLC SERPL-MCNC: 53 MG/DL
HGB BLD-MCNC: 13.2 G/DL
IMM GRANULOCYTES NFR BLD AUTO: 0.7 %
LDLC SERPL CALC-MCNC: 118 MG/DL
LYMPHOCYTES # BLD AUTO: 3.33 K/UL
LYMPHOCYTES NFR BLD AUTO: 24.9 %
MAN DIFF?: NORMAL
MCHC RBC-ENTMCNC: 29.1 PG
MCHC RBC-ENTMCNC: 31.5 GM/DL
MCV RBC AUTO: 92.5 FL
MONOCYTES # BLD AUTO: 1.03 K/UL
MONOCYTES NFR BLD AUTO: 7.7 %
NEUTROPHILS # BLD AUTO: 8.65 K/UL
NEUTROPHILS NFR BLD AUTO: 64.6 %
PLATELET # BLD AUTO: 615 K/UL
POTASSIUM SERPL-SCNC: 4.7 MMOL/L
PROT SERPL-MCNC: 7.1 G/DL
RBC # BLD: 4.53 M/UL
RBC # FLD: 14.5 %
SODIUM SERPL-SCNC: 141 MMOL/L
T3 SERPL-MCNC: 90 NG/DL
T4 SERPL-MCNC: 9.2 UG/DL
TRIGL SERPL-MCNC: 207 MG/DL
TSH SERPL-ACNC: 2.26 UIU/ML
WBC # FLD AUTO: 13.4 K/UL

## 2019-07-08 ENCOUNTER — APPOINTMENT (OUTPATIENT)
Dept: FAMILY MEDICINE | Facility: CLINIC | Age: 64
End: 2019-07-08
Payer: MEDICARE

## 2019-07-08 VITALS
RESPIRATION RATE: 16 BRPM | HEIGHT: 63 IN | DIASTOLIC BLOOD PRESSURE: 70 MMHG | BODY MASS INDEX: 17.89 KG/M2 | SYSTOLIC BLOOD PRESSURE: 110 MMHG | HEART RATE: 76 BPM | OXYGEN SATURATION: 92 % | TEMPERATURE: 98.2 F | WEIGHT: 101 LBS

## 2019-07-08 DIAGNOSIS — R73.09 OTHER ABNORMAL GLUCOSE: ICD-10-CM

## 2019-07-08 LAB — CYTOLOGY CVX/VAG DOC THIN PREP: NORMAL

## 2019-07-08 PROCEDURE — 99213 OFFICE O/P EST LOW 20 MIN: CPT

## 2019-07-08 NOTE — HISTORY OF PRESENT ILLNESS
[FreeTextEntry1] : APRIL is here to discuss labs and Rx renewal: Rosuvastatin 5MG\par [facility] [place]

## 2019-07-08 NOTE — PHYSICAL EXAM
[No Acute Distress] : no acute distress [Well Nourished] : well nourished [Well Developed] : well developed [Well-Appearing] : well-appearing [Normal Sclera/Conjunctiva] : normal sclera/conjunctiva [EOMI] : extraocular movements intact [PERRL] : pupils equal round and reactive to light [Fundoscopic Exam Performed] : fundoscopic ~T exam ~C was performed [Normal Outer Ear/Nose] : the outer ears and nose were normal in appearance [Normal Oropharynx] : the oropharynx was normal [Normal TMs] : both tympanic membranes were normal [Normal Nasal Mucosa] : the nasal mucosa was normal [No JVD] : no jugular venous distention [Supple] : supple [No Lymphadenopathy] : no lymphadenopathy [Thyroid Normal, No Nodules] : the thyroid was normal and there were no nodules present [No Respiratory Distress] : no respiratory distress  [No Accessory Muscle Use] : no accessory muscle use [Normal Rate] : normal rate  [Regular Rhythm] : with a regular rhythm [Normal S1, S2] : normal S1 and S2 [No Murmur] : no murmur heard [Soft] : abdomen soft [Non Tender] : non-tender [Non-distended] : non-distended [Normal Posterior Cervical Nodes] : no posterior cervical lymphadenopathy [Normal Anterior Cervical Nodes] : no anterior cervical lymphadenopathy [No CVA Tenderness] : no CVA  tenderness [No Joint Swelling] : no joint swelling [No Spinal Tenderness] : no spinal tenderness [Grossly Normal Strength/Tone] : grossly normal strength/tone [No Rash] : no rash [Coordination Grossly Intact] : coordination grossly intact [Normal Gait] : normal gait [No Focal Deficits] : no focal deficits [Deep Tendon Reflexes (DTR)] : deep tendon reflexes were 2+ and symmetric [Normal Insight/Judgement] : insight and judgment were intact [Normal Affect] : the affect was normal [Clear to Auscultation] : lungs were clear to auscultation bilaterally

## 2019-07-08 NOTE — PLAN
[FreeTextEntry1] : Reviewed blood work. APRIL will RTO in 1 month for repeat WBC and lipids. \par Rx crestor renewed.

## 2019-08-07 ENCOUNTER — APPOINTMENT (OUTPATIENT)
Dept: FAMILY MEDICINE | Facility: CLINIC | Age: 64
End: 2019-08-07
Payer: MEDICARE

## 2019-08-07 DIAGNOSIS — D72.829 ELEVATED WHITE BLOOD CELL COUNT, UNSPECIFIED: ICD-10-CM

## 2019-08-07 PROCEDURE — 36415 COLL VENOUS BLD VENIPUNCTURE: CPT

## 2019-08-13 ENCOUNTER — RX RENEWAL (OUTPATIENT)
Age: 64
End: 2019-08-13

## 2019-08-19 LAB
BASOPHILS # BLD AUTO: 0.05 K/UL
BASOPHILS NFR BLD AUTO: 0.7 %
CHOLEST SERPL-MCNC: 212 MG/DL
CHOLEST/HDLC SERPL: 3.6 RATIO
EOSINOPHIL # BLD AUTO: 0.24 K/UL
EOSINOPHIL NFR BLD AUTO: 3.4 %
HCT VFR BLD CALC: 43.5 %
HDLC SERPL-MCNC: 59 MG/DL
HGB BLD-MCNC: 13.8 G/DL
IMM GRANULOCYTES NFR BLD AUTO: 0.3 %
LDLC SERPL CALC-MCNC: 133 MG/DL
LYMPHOCYTES # BLD AUTO: 2.05 K/UL
LYMPHOCYTES NFR BLD AUTO: 29.1 %
MAN DIFF?: NORMAL
MCHC RBC-ENTMCNC: 29 PG
MCHC RBC-ENTMCNC: 31.7 GM/DL
MCV RBC AUTO: 91.4 FL
MONOCYTES # BLD AUTO: 0.73 K/UL
MONOCYTES NFR BLD AUTO: 10.4 %
NEUTROPHILS # BLD AUTO: 3.96 K/UL
NEUTROPHILS NFR BLD AUTO: 56.1 %
PLATELET # BLD AUTO: 454 K/UL
RBC # BLD: 4.76 M/UL
RBC # FLD: 14.4 %
TRIGL SERPL-MCNC: 102 MG/DL
WBC # FLD AUTO: 7.05 K/UL

## 2019-08-28 ENCOUNTER — RX RENEWAL (OUTPATIENT)
Age: 64
End: 2019-08-28

## 2019-10-29 ENCOUNTER — RX RENEWAL (OUTPATIENT)
Age: 64
End: 2019-10-29

## 2019-11-04 ENCOUNTER — APPOINTMENT (OUTPATIENT)
Dept: FAMILY MEDICINE | Facility: CLINIC | Age: 64
End: 2019-11-04
Payer: MEDICARE

## 2019-11-04 VITALS
BODY MASS INDEX: 20.38 KG/M2 | HEART RATE: 68 BPM | RESPIRATION RATE: 15 BRPM | TEMPERATURE: 98.3 F | WEIGHT: 115 LBS | HEIGHT: 63 IN | OXYGEN SATURATION: 94 % | DIASTOLIC BLOOD PRESSURE: 74 MMHG | SYSTOLIC BLOOD PRESSURE: 164 MMHG

## 2019-11-04 LAB
ALBUMIN: 30
CREATININE: 50
MICROALBUMIN/CREAT UR TEST STR-RTO: ABNORMAL

## 2019-11-04 PROCEDURE — 36415 COLL VENOUS BLD VENIPUNCTURE: CPT

## 2019-11-04 PROCEDURE — 82044 UR ALBUMIN SEMIQUANTITATIVE: CPT | Mod: QW

## 2019-11-04 PROCEDURE — 99214 OFFICE O/P EST MOD 30 MIN: CPT | Mod: 25

## 2019-11-04 NOTE — PHYSICAL EXAM
[No Acute Distress] : no acute distress [Well Nourished] : well nourished [Well Developed] : well developed [Well-Appearing] : well-appearing [Normal Sclera/Conjunctiva] : normal sclera/conjunctiva [PERRL] : pupils equal round and reactive to light [EOMI] : extraocular movements intact [Normal Outer Ear/Nose] : the outer ears and nose were normal in appearance [Normal Oropharynx] : the oropharynx was normal [No JVD] : no jugular venous distention [No Lymphadenopathy] : no lymphadenopathy [Supple] : supple [Thyroid Normal, No Nodules] : the thyroid was normal and there were no nodules present [No Respiratory Distress] : no respiratory distress  [No Accessory Muscle Use] : no accessory muscle use [Clear to Auscultation] : lungs were clear to auscultation bilaterally [Normal Rate] : normal rate  [Regular Rhythm] : with a regular rhythm [Normal S1, S2] : normal S1 and S2 [No Murmur] : no murmur heard [No Carotid Bruits] : no carotid bruits [No Abdominal Bruit] : a ~M bruit was not heard ~T in the abdomen [No Varicosities] : no varicosities [Pedal Pulses Present] : the pedal pulses are present [No Edema] : there was no peripheral edema [No Palpable Aorta] : no palpable aorta [No Extremity Clubbing/Cyanosis] : no extremity clubbing/cyanosis [Normal Posterior Cervical Nodes] : no posterior cervical lymphadenopathy [Normal Anterior Cervical Nodes] : no anterior cervical lymphadenopathy [No CVA Tenderness] : no CVA  tenderness [No Spinal Tenderness] : no spinal tenderness [No Joint Swelling] : no joint swelling [Grossly Normal Strength/Tone] : grossly normal strength/tone [No Rash] : no rash [Coordination Grossly Intact] : coordination grossly intact [No Focal Deficits] : no focal deficits [Normal Gait] : normal gait [Normal Affect] : the affect was normal [Alert and Oriented x3] : oriented to person, place, and time [Normal Insight/Judgement] : insight and judgment were intact [de-identified] : arthritic appearing hands, right knee

## 2019-11-04 NOTE — HISTORY OF PRESENT ILLNESS
[FreeTextEntry1] : 3 month followup\par  [de-identified] : April presents for fasting blood work and medication refill. She feels well except for OA of right knee

## 2019-11-05 LAB
ALBUMIN SERPL ELPH-MCNC: 4.4 G/DL
ALP BLD-CCNC: 94 U/L
ALT SERPL-CCNC: 15 U/L
ANION GAP SERPL CALC-SCNC: 12 MMOL/L
AST SERPL-CCNC: 24 U/L
BASOPHILS # BLD AUTO: 0.07 K/UL
BASOPHILS NFR BLD AUTO: 1.1 %
BILIRUB SERPL-MCNC: 0.2 MG/DL
BUN SERPL-MCNC: 12 MG/DL
CALCIUM SERPL-MCNC: 10 MG/DL
CHLORIDE SERPL-SCNC: 101 MMOL/L
CHOLEST SERPL-MCNC: 259 MG/DL
CHOLEST/HDLC SERPL: 4.3 RATIO
CO2 SERPL-SCNC: 28 MMOL/L
CREAT SERPL-MCNC: 0.62 MG/DL
EOSINOPHIL # BLD AUTO: 0.32 K/UL
EOSINOPHIL NFR BLD AUTO: 5.2 %
GLUCOSE SERPL-MCNC: 77 MG/DL
HCT VFR BLD CALC: 43.1 %
HDLC SERPL-MCNC: 60 MG/DL
HGB BLD-MCNC: 13.8 G/DL
IMM GRANULOCYTES NFR BLD AUTO: 0.2 %
LDLC SERPL CALC-MCNC: 178 MG/DL
LYMPHOCYTES # BLD AUTO: 2.36 K/UL
LYMPHOCYTES NFR BLD AUTO: 38.1 %
MAN DIFF?: NORMAL
MCHC RBC-ENTMCNC: 28.6 PG
MCHC RBC-ENTMCNC: 32 GM/DL
MCV RBC AUTO: 89.2 FL
MONOCYTES # BLD AUTO: 0.58 K/UL
MONOCYTES NFR BLD AUTO: 9.4 %
NEUTROPHILS # BLD AUTO: 2.85 K/UL
NEUTROPHILS NFR BLD AUTO: 46 %
PLATELET # BLD AUTO: 412 K/UL
POTASSIUM SERPL-SCNC: 4.8 MMOL/L
PROT SERPL-MCNC: 7 G/DL
RBC # BLD: 4.83 M/UL
RBC # FLD: 14.5 %
SODIUM SERPL-SCNC: 141 MMOL/L
TRIGL SERPL-MCNC: 104 MG/DL
TSH SERPL-ACNC: 0.3 UIU/ML
WBC # FLD AUTO: 6.19 K/UL

## 2019-11-08 DIAGNOSIS — Z00.00 ENCOUNTER FOR GENERAL ADULT MEDICAL EXAMINATION W/OUT ABNORMAL FINDINGS: ICD-10-CM

## 2019-12-23 PROBLEM — Z00.00 ENCOUNTER FOR PREVENTIVE HEALTH EXAMINATION: Status: ACTIVE | Noted: 2017-08-02

## 2019-12-27 ENCOUNTER — APPOINTMENT (OUTPATIENT)
Dept: ULTRASOUND IMAGING | Facility: CLINIC | Age: 64
End: 2019-12-27
Payer: MEDICARE

## 2019-12-27 PROCEDURE — 93880 EXTRACRANIAL BILAT STUDY: CPT

## 2020-01-07 ENCOUNTER — APPOINTMENT (OUTPATIENT)
Dept: ULTRASOUND IMAGING | Facility: CLINIC | Age: 65
End: 2020-01-07
Payer: MEDICARE

## 2020-01-07 PROCEDURE — 76775 US EXAM ABDO BACK WALL LIM: CPT

## 2020-03-03 ENCOUNTER — APPOINTMENT (OUTPATIENT)
Dept: FAMILY MEDICINE | Facility: CLINIC | Age: 65
End: 2020-03-03
Payer: MEDICARE

## 2020-03-03 ENCOUNTER — NON-APPOINTMENT (OUTPATIENT)
Age: 65
End: 2020-03-03

## 2020-03-03 VITALS
RESPIRATION RATE: 16 BRPM | HEIGHT: 63 IN | DIASTOLIC BLOOD PRESSURE: 58 MMHG | TEMPERATURE: 97.8 F | OXYGEN SATURATION: 94 % | SYSTOLIC BLOOD PRESSURE: 120 MMHG | HEART RATE: 78 BPM | BODY MASS INDEX: 20.2 KG/M2 | WEIGHT: 114 LBS

## 2020-03-03 DIAGNOSIS — I63.9 CEREBRAL INFARCTION, UNSPECIFIED: ICD-10-CM

## 2020-03-03 DIAGNOSIS — E03.9 HYPOTHYROIDISM, UNSPECIFIED: ICD-10-CM

## 2020-03-03 DIAGNOSIS — Z87.01 PERSONAL HISTORY OF PNEUMONIA (RECURRENT): ICD-10-CM

## 2020-03-03 DIAGNOSIS — Z80.3 FAMILY HISTORY OF MALIGNANT NEOPLASM OF BREAST: ICD-10-CM

## 2020-03-03 DIAGNOSIS — H53.131 SUDDEN VISUAL LOSS, RIGHT EYE: ICD-10-CM

## 2020-03-03 PROCEDURE — 99214 OFFICE O/P EST MOD 30 MIN: CPT | Mod: 25

## 2020-03-03 PROCEDURE — 93000 ELECTROCARDIOGRAM COMPLETE: CPT

## 2020-03-03 PROCEDURE — 36415 COLL VENOUS BLD VENIPUNCTURE: CPT

## 2020-03-03 RX ORDER — NEBULIZER ACCESSORIES
KIT MISCELLANEOUS
Qty: 1 | Refills: 0 | Status: DISCONTINUED | COMMUNITY
Start: 2019-06-18 | End: 2020-03-03

## 2020-03-03 RX ORDER — NICOTINE TRANSDERMAL SYSTEM 21 MG/24H
21 PATCH, EXTENDED RELEASE TRANSDERMAL DAILY
Qty: 30 | Refills: 0 | Status: DISCONTINUED | COMMUNITY
Start: 2020-01-02 | End: 2020-03-03

## 2020-03-03 RX ORDER — ALBUTEROL SULFATE 2.5 MG/3ML
(2.5 MG/3ML) SOLUTION RESPIRATORY (INHALATION)
Qty: 1 | Refills: 1 | Status: DISCONTINUED | COMMUNITY
Start: 2019-06-18 | End: 2020-03-03

## 2020-03-03 NOTE — HISTORY OF PRESENT ILLNESS
[FreeTextEntry1] : Pt here for follow up, fasting blood work\par Rx refill rosuvastatin, amlodipine, sertraline, levothyroxine\par [facility]  [de-identified] : off lipids wendy 2 weeks \par smoker tobacco hx rt eye vision loss stroke per ophtho no cardio work up\par stable alert NAD\par no acute changes\par

## 2020-03-03 NOTE — PHYSICAL EXAM
[No Acute Distress] : no acute distress [Well Developed] : well developed [Well Nourished] : well nourished [Well-Appearing] : well-appearing [PERRL] : pupils equal round and reactive to light [Normal Sclera/Conjunctiva] : normal sclera/conjunctiva [Normal Outer Ear/Nose] : the outer ears and nose were normal in appearance [EOMI] : extraocular movements intact [Normal Oropharynx] : the oropharynx was normal [No JVD] : no jugular venous distention [No Lymphadenopathy] : no lymphadenopathy [Supple] : supple [Thyroid Normal, No Nodules] : the thyroid was normal and there were no nodules present [No Respiratory Distress] : no respiratory distress  [No Accessory Muscle Use] : no accessory muscle use [Normal Rate] : normal rate  [Clear to Auscultation] : lungs were clear to auscultation bilaterally [Regular Rhythm] : with a regular rhythm [Normal S1, S2] : normal S1 and S2 [No Abdominal Bruit] : a ~M bruit was not heard ~T in the abdomen [No Carotid Bruits] : no carotid bruits [No Murmur] : no murmur heard [No Varicosities] : no varicosities [Pedal Pulses Present] : the pedal pulses are present [No Edema] : there was no peripheral edema [No Extremity Clubbing/Cyanosis] : no extremity clubbing/cyanosis [No Palpable Aorta] : no palpable aorta [Non-distended] : non-distended [Non Tender] : non-tender [Soft] : abdomen soft [No Masses] : no abdominal mass palpated [No HSM] : no HSM [Normal Anterior Cervical Nodes] : no anterior cervical lymphadenopathy [Normal Posterior Cervical Nodes] : no posterior cervical lymphadenopathy [Normal Bowel Sounds] : normal bowel sounds [No Joint Swelling] : no joint swelling [No CVA Tenderness] : no CVA  tenderness [No Spinal Tenderness] : no spinal tenderness [Grossly Normal Strength/Tone] : grossly normal strength/tone [No Rash] : no rash [Coordination Grossly Intact] : coordination grossly intact [Normal Gait] : normal gait [No Focal Deficits] : no focal deficits [Normal Insight/Judgement] : insight and judgment were intact [Normal Affect] : the affect was normal [Deep Tendon Reflexes (DTR)] : deep tendon reflexes were 2+ and symmetric

## 2020-03-04 LAB
ALBUMIN SERPL ELPH-MCNC: 4.6 G/DL
ALP BLD-CCNC: 83 U/L
ALT SERPL-CCNC: 11 U/L
ANION GAP SERPL CALC-SCNC: 14 MMOL/L
AST SERPL-CCNC: 27 U/L
BILIRUB SERPL-MCNC: 0.2 MG/DL
BUN SERPL-MCNC: 14 MG/DL
CALCIUM SERPL-MCNC: 9.8 MG/DL
CHLORIDE SERPL-SCNC: 101 MMOL/L
CHOLEST SERPL-MCNC: 247 MG/DL
CHOLEST/HDLC SERPL: 3.8 RATIO
CO2 SERPL-SCNC: 24 MMOL/L
CREAT SERPL-MCNC: 0.59 MG/DL
GLUCOSE SERPL-MCNC: 94 MG/DL
HDLC SERPL-MCNC: 66 MG/DL
LDLC SERPL CALC-MCNC: 162 MG/DL
POTASSIUM SERPL-SCNC: 5 MMOL/L
PROT SERPL-MCNC: 7.1 G/DL
SODIUM SERPL-SCNC: 139 MMOL/L
TRIGL SERPL-MCNC: 97 MG/DL

## 2020-03-06 LAB — APO LP(A) SERPL-MCNC: 276.6 NMOL/L

## 2020-05-22 RX ORDER — AMLODIPINE BESYLATE 10 MG/1
10 TABLET ORAL
Qty: 90 | Refills: 0 | Status: ACTIVE | COMMUNITY
Start: 2017-07-28 | End: 1900-01-01

## 2020-05-26 ENCOUNTER — RX RENEWAL (OUTPATIENT)
Age: 65
End: 2020-05-26

## 2020-06-02 ENCOUNTER — RX RENEWAL (OUTPATIENT)
Age: 65
End: 2020-06-02

## 2020-12-21 PROBLEM — Z87.09 HISTORY OF ACUTE BRONCHITIS: Status: RESOLVED | Noted: 2019-06-14 | Resolved: 2020-12-21

## 2021-01-19 ENCOUNTER — RESULT REVIEW (OUTPATIENT)
Age: 66
End: 2021-01-19

## 2021-01-19 ENCOUNTER — APPOINTMENT (OUTPATIENT)
Dept: ULTRASOUND IMAGING | Facility: CLINIC | Age: 66
End: 2021-01-19
Payer: MEDICARE

## 2021-01-19 PROCEDURE — 93880 EXTRACRANIAL BILAT STUDY: CPT

## 2021-02-05 ENCOUNTER — RESULT REVIEW (OUTPATIENT)
Age: 66
End: 2021-02-05

## 2021-02-05 ENCOUNTER — APPOINTMENT (OUTPATIENT)
Dept: MAMMOGRAPHY | Facility: CLINIC | Age: 66
End: 2021-02-05
Payer: MEDICARE

## 2021-02-05 PROCEDURE — 77067 SCR MAMMO BI INCL CAD: CPT

## 2021-02-05 PROCEDURE — 77063 BREAST TOMOSYNTHESIS BI: CPT

## 2022-05-16 NOTE — DISCHARGE NOTE ADULT - NS AS DC FU INST LIST INST
"I have reviewed the surgical (or preoperative) H&P that is linked to this encounter, and examined the patient. There are no significant changes    Clinical Conditions Present on Arrival:  Clinically Significant Risk Factors Present on Admission                  # Platelet Defect: home medication list includes an antiplatelet medication  # Obesity: Estimated body mass index is 37.55 kg/m  as calculated from the following:    Height as of this encounter: 1.6 m (5' 3\").    Weight as of this encounter: 96.2 kg (212 lb).       " no

## 2022-07-27 ENCOUNTER — APPOINTMENT (OUTPATIENT)
Dept: RADIOLOGY | Facility: CLINIC | Age: 67
End: 2022-07-27

## 2022-07-27 ENCOUNTER — APPOINTMENT (OUTPATIENT)
Dept: MAMMOGRAPHY | Facility: CLINIC | Age: 67
End: 2022-07-27

## 2022-07-27 PROCEDURE — 77067 SCR MAMMO BI INCL CAD: CPT

## 2022-07-27 PROCEDURE — 77063 BREAST TOMOSYNTHESIS BI: CPT

## 2022-07-27 PROCEDURE — 71046 X-RAY EXAM CHEST 2 VIEWS: CPT

## 2022-07-27 PROCEDURE — 77080 DXA BONE DENSITY AXIAL: CPT

## 2023-01-25 ENCOUNTER — APPOINTMENT (OUTPATIENT)
Dept: ULTRASOUND IMAGING | Facility: CLINIC | Age: 68
End: 2023-01-25
Payer: MEDICARE

## 2023-01-25 PROCEDURE — 93925 LOWER EXTREMITY STUDY: CPT

## 2023-02-07 ENCOUNTER — OFFICE (OUTPATIENT)
Dept: URBAN - METROPOLITAN AREA CLINIC 38 | Facility: CLINIC | Age: 68
Setting detail: OPHTHALMOLOGY
End: 2023-02-07
Payer: MEDICARE

## 2023-02-07 DIAGNOSIS — H16.223: ICD-10-CM

## 2023-02-07 DIAGNOSIS — H34.231: ICD-10-CM

## 2023-02-07 DIAGNOSIS — Z96.1: ICD-10-CM

## 2023-02-07 DIAGNOSIS — H02.824: ICD-10-CM

## 2023-02-07 PROCEDURE — 92014 COMPRE OPH EXAM EST PT 1/>: CPT | Performed by: OPTOMETRIST

## 2023-02-07 ASSESSMENT — REFRACTION_CURRENTRX
OD_SPHERE: +4.25
OS_OVR_VA: 20/
OD_VPRISM_DIRECTION: SV
OS_SPHERE: +4.25
OD_OVR_VA: 20/
OS_VPRISM_DIRECTION: SV

## 2023-02-07 ASSESSMENT — REFRACTION_AUTOREFRACTION
OS_SPHERE: +0.25
OD_SPHERE: +1.75
OS_AXIS: 035
OD_AXIS: 145
OS_CYLINDER: -0.50
OD_CYLINDER: -0.75

## 2023-02-07 ASSESSMENT — SPHEQUIV_DERIVED
OD_SPHEQUIV: 1.375
OS_SPHEQUIV: 0
OD_SPHEQUIV: 1.375

## 2023-02-07 ASSESSMENT — VISUAL ACUITY
OS_BCVA: 20/50
OD_BCVA: 20/20-

## 2023-02-07 ASSESSMENT — CONFRONTATIONAL VISUAL FIELD TEST (CVF)
OD_FINDINGS: FULL
OS_FINDINGS: FULL

## 2023-02-07 ASSESSMENT — KERATOMETRY
OD_K1POWER_DIOPTERS: 43.50
METHOD_AUTO_MANUAL: AUTO
OS_AXISANGLE_DEGREES: 100
OD_AXISANGLE_DEGREES: 070
OD_K2POWER_DIOPTERS: 44.25
OS_K1POWER_DIOPTERS: 43.25
OS_K2POWER_DIOPTERS: 44.00

## 2023-02-07 ASSESSMENT — AXIALLENGTH_DERIVED
OD_AL: 22.9371
OD_AL: 22.9371
OS_AL: 23.5461

## 2023-02-07 ASSESSMENT — REFRACTION_MANIFEST
OD_VA1: 20/20-
OU_VA: 20/20
OD_CYLINDER: -0.75
OS_VA1: 20/20
OS_ADD: +2.50
OD_ADD: +2.50
OD_AXIS: 145
OS_AXIS: 035
OD_SPHERE: +1.75
OS_SPHERE: PLANO
OS_CYLINDER: -0.25

## 2023-02-07 ASSESSMENT — SUPERFICIAL PUNCTATE KERATITIS (SPK): OS_SPK: T

## 2023-02-07 ASSESSMENT — TONOMETRY
OD_IOP_MMHG: 16
OS_IOP_MMHG: 17

## 2023-04-14 ENCOUNTER — OFFICE (OUTPATIENT)
Dept: URBAN - METROPOLITAN AREA CLINIC 38 | Facility: CLINIC | Age: 68
Setting detail: OPHTHALMOLOGY
End: 2023-04-14
Payer: MEDICARE

## 2023-04-14 ENCOUNTER — RX ONLY (RX ONLY)
Age: 68
End: 2023-04-14

## 2023-04-14 DIAGNOSIS — H02.822: ICD-10-CM

## 2023-04-14 PROCEDURE — 92285 EXTERNAL OCULAR PHOTOGRAPHY: CPT | Performed by: OPHTHALMOLOGY

## 2023-04-14 PROCEDURE — 67840 REMOVE EYELID LESION: CPT | Performed by: OPHTHALMOLOGY

## 2023-04-14 ASSESSMENT — REFRACTION_AUTOREFRACTION
OD_AXIS: 145
OD_SPHERE: +1.75
OS_AXIS: 035
OD_CYLINDER: -0.75
OS_SPHERE: +0.25
OS_CYLINDER: -0.50

## 2023-04-14 ASSESSMENT — CONFRONTATIONAL VISUAL FIELD TEST (CVF)
OD_FINDINGS: FULL
OS_FINDINGS: FULL

## 2023-04-14 ASSESSMENT — KERATOMETRY
OD_K2POWER_DIOPTERS: 44.25
METHOD_AUTO_MANUAL: AUTO
OD_AXISANGLE_DEGREES: 070
OD_K1POWER_DIOPTERS: 43.50
OS_K1POWER_DIOPTERS: 43.25
OS_K2POWER_DIOPTERS: 44.00
OS_AXISANGLE_DEGREES: 100

## 2023-04-14 ASSESSMENT — SUPERFICIAL PUNCTATE KERATITIS (SPK): OS_SPK: T

## 2023-04-14 ASSESSMENT — AXIALLENGTH_DERIVED
OS_AL: 23.5461
OD_AL: 22.9371

## 2023-04-14 ASSESSMENT — VISUAL ACUITY
OS_BCVA: 20/50
OD_BCVA: 20/20-

## 2023-04-14 ASSESSMENT — SPHEQUIV_DERIVED
OD_SPHEQUIV: 1.375
OS_SPHEQUIV: 0

## 2023-08-24 ENCOUNTER — NON-APPOINTMENT (OUTPATIENT)
Age: 68
End: 2023-08-24

## 2023-08-25 ENCOUNTER — NON-APPOINTMENT (OUTPATIENT)
Age: 68
End: 2023-08-25

## 2023-08-25 ENCOUNTER — APPOINTMENT (OUTPATIENT)
Dept: CARDIOLOGY | Facility: CLINIC | Age: 68
End: 2023-08-25
Payer: MEDICARE

## 2023-08-25 VITALS
OXYGEN SATURATION: 97 % | DIASTOLIC BLOOD PRESSURE: 70 MMHG | SYSTOLIC BLOOD PRESSURE: 146 MMHG | BODY MASS INDEX: 18.96 KG/M2 | HEART RATE: 82 BPM | WEIGHT: 107 LBS | HEIGHT: 63 IN

## 2023-08-25 VITALS — SYSTOLIC BLOOD PRESSURE: 150 MMHG | DIASTOLIC BLOOD PRESSURE: 68 MMHG

## 2023-08-25 DIAGNOSIS — Z78.9 OTHER SPECIFIED HEALTH STATUS: ICD-10-CM

## 2023-08-25 DIAGNOSIS — Z72.3 LACK OF PHYSICAL EXERCISE: ICD-10-CM

## 2023-08-25 PROCEDURE — 93000 ELECTROCARDIOGRAM COMPLETE: CPT

## 2023-08-25 PROCEDURE — 99205 OFFICE O/P NEW HI 60 MIN: CPT | Mod: 25

## 2023-08-25 RX ORDER — SERTRALINE HYDROCHLORIDE 100 MG/1
100 TABLET, FILM COATED ORAL
Qty: 90 | Refills: 0 | Status: DISCONTINUED | COMMUNITY
Start: 2018-06-04 | End: 2023-08-25

## 2023-08-25 RX ORDER — SERTRALINE HYDROCHLORIDE 150 MG/1
150 CAPSULE ORAL
Refills: 0 | Status: ACTIVE | COMMUNITY

## 2023-08-25 RX ORDER — ROSUVASTATIN CALCIUM 5 MG/1
5 TABLET, FILM COATED ORAL
Qty: 90 | Refills: 0 | Status: DISCONTINUED | COMMUNITY
Start: 2017-10-25 | End: 2023-08-25

## 2023-08-25 RX ORDER — LEVOTHYROXINE SODIUM 0.12 MG/1
125 TABLET ORAL DAILY
Qty: 90 | Refills: 0 | Status: DISCONTINUED | COMMUNITY
Start: 2018-08-16 | End: 2023-08-25

## 2023-08-25 RX ORDER — LEVOTHYROXINE SODIUM 112 UG/1
112 CAPSULE ORAL DAILY
Refills: 0 | Status: ACTIVE | COMMUNITY

## 2023-08-25 NOTE — REASON FOR VISIT
[Symptom and Test Evaluation] : symptom and test evaluation [CV Risk Factors and Non-Cardiac Disease] : CV risk factors and non-cardiac disease [Coronary Artery Disease] : coronary artery disease [Carotid, Aortic and Peripheral Vascular Disease] : carotid, aortic and peripheral vascular disease

## 2023-08-26 NOTE — HISTORY OF PRESENT ILLNESS
[FreeTextEntry1] : 67 yo M referred by Dr. Bisi Galicia for PAD and CV evaluation. I see her  for cardiac conditions. Has PAD, COPD, HTN, HLD, tobacco use, q waves EKG  8/2023 INITIAL VISIT HISTORICAL REFERENCE: she reports chest discomfort for past 2 years both exertional and non exertional. no dyspnea when she walks. can go upstairs without difficulty. can walk 500 feet down street limited by right knee pain. still 1 ppd tobacco.  for PAD: has symmetric calf burning but she pushes through it. the burning starts after 50 feet. does report lifestyle would be better if this symptom did not exist. no wounds or ulcers.  PET CT recently without malignancy.  pertinent physical exam: thin, 1+ b/l cfa, warm, faint distal pulses (monophasic R DP/PT, biphasic L PT), scattered wheezing

## 2023-08-26 NOTE — DISCUSSION/SUMMARY
[FreeTextEntry1] :  # Atypical chest pain: abnormal EKG q waves noted - CCTA best test for her given abnormal baseline EKG and wheezing to avoid dilator agent - echocardiogram  - plavix monotherapy for now (if needs dapt we will put on GI since aspirin is not true allergy) - increase rosuva to 40  # PAD: lifestyle limiting claudication but also multifactorial limitation as detailed - [place] and imaging ordered - exercise therapy follow symptoms - med therapy  # HTN: follow BPs current therapy. low threshold to increase  OA knee pain  # COPD: wheezing noted. consider inhalers/pulm f/u defer to PCP  Follow after testing.  ER precautions given to patient.

## 2023-09-13 ENCOUNTER — NON-APPOINTMENT (OUTPATIENT)
Age: 68
End: 2023-09-13

## 2023-09-13 ENCOUNTER — APPOINTMENT (OUTPATIENT)
Dept: CARDIOLOGY | Facility: CLINIC | Age: 68
End: 2023-09-13
Payer: MEDICARE

## 2023-09-13 PROCEDURE — 93923 UPR/LXTR ART STDY 3+ LVLS: CPT

## 2023-10-02 ENCOUNTER — APPOINTMENT (OUTPATIENT)
Dept: CARDIOLOGY | Facility: CLINIC | Age: 68
End: 2023-10-02

## 2023-10-05 ENCOUNTER — NON-APPOINTMENT (OUTPATIENT)
Age: 68
End: 2023-10-05

## 2023-10-06 ENCOUNTER — APPOINTMENT (OUTPATIENT)
Dept: CARDIOLOGY | Facility: CLINIC | Age: 68
End: 2023-10-06
Payer: MEDICARE

## 2023-10-06 PROCEDURE — 93306 TTE W/DOPPLER COMPLETE: CPT

## 2023-10-06 PROCEDURE — 93880 EXTRACRANIAL BILAT STUDY: CPT

## 2023-10-07 ENCOUNTER — NON-APPOINTMENT (OUTPATIENT)
Age: 68
End: 2023-10-07

## 2023-10-20 ENCOUNTER — APPOINTMENT (OUTPATIENT)
Dept: CARDIOLOGY | Facility: CLINIC | Age: 68
End: 2023-10-20
Payer: MEDICARE

## 2023-10-20 VITALS
HEART RATE: 66 BPM | BODY MASS INDEX: 18.96 KG/M2 | SYSTOLIC BLOOD PRESSURE: 128 MMHG | WEIGHT: 107 LBS | OXYGEN SATURATION: 95 % | DIASTOLIC BLOOD PRESSURE: 62 MMHG | HEIGHT: 63 IN

## 2023-10-20 PROCEDURE — 99215 OFFICE O/P EST HI 40 MIN: CPT

## 2023-11-13 ENCOUNTER — APPOINTMENT (OUTPATIENT)
Dept: CARDIOLOGY | Facility: CLINIC | Age: 68
End: 2023-11-13
Payer: MEDICARE

## 2023-11-13 ENCOUNTER — NON-APPOINTMENT (OUTPATIENT)
Age: 68
End: 2023-11-13

## 2023-11-13 PROCEDURE — 93925 LOWER EXTREMITY STUDY: CPT

## 2023-12-27 PROBLEM — R07.89 CHEST DISCOMFORT: Status: ACTIVE | Noted: 2023-08-25

## 2023-12-29 ENCOUNTER — APPOINTMENT (OUTPATIENT)
Dept: CARDIOLOGY | Facility: CLINIC | Age: 68
End: 2023-12-29
Payer: MEDICARE

## 2023-12-29 VITALS
DIASTOLIC BLOOD PRESSURE: 58 MMHG | HEART RATE: 61 BPM | OXYGEN SATURATION: 96 % | BODY MASS INDEX: 18.96 KG/M2 | HEIGHT: 63 IN | WEIGHT: 107 LBS | SYSTOLIC BLOOD PRESSURE: 144 MMHG

## 2023-12-29 VITALS — DIASTOLIC BLOOD PRESSURE: 70 MMHG | SYSTOLIC BLOOD PRESSURE: 128 MMHG

## 2023-12-29 DIAGNOSIS — R07.89 OTHER CHEST PAIN: ICD-10-CM

## 2023-12-29 DIAGNOSIS — Z01.818 ENCOUNTER FOR OTHER PREPROCEDURAL EXAMINATION: ICD-10-CM

## 2023-12-29 PROCEDURE — 99215 OFFICE O/P EST HI 40 MIN: CPT

## 2023-12-29 NOTE — DISCUSSION/SUMMARY
[FreeTextEntry1] : 67 yo M referred by Dr. Bisi Galicia for PAD and CV evaluation. I see her  for cardiac conditions. She has CAD, PAD, COPD, HTN, HLD, tobacco use.   Her chest pain is not definitively coronary in nature however her CCTA does show hemodynamically significant disease especially in LAD system. Will perform coronary angiogram rule out critical disease. She is on plavix monotherapy for now (if needs dapt we will put on since aspirin is not true allergy). Maintain high potency statin.   She has claudication and findings of multilevel lower extremity PAD as detailed above. She has multifactorial functional limitation. She will do exercise therapy and lower tobacco use. I will perform a peripheral angiogram at time of coronary angiogram in February. Add cilostazol for symptomatic benefit.  She has wheezing on exam, likely has some component of pulm disease. Defer to PCP.   Follow after procedure in February 2024. ER precautions given to patient.

## 2023-12-29 NOTE — REASON FOR VISIT
[Symptom and Test Evaluation] : symptom and test evaluation [CV Risk Factors and Non-Cardiac Disease] : CV risk factors and non-cardiac disease [Coronary Artery Disease] : coronary artery disease [Carotid, Aortic and Peripheral Vascular Disease] : carotid, aortic and peripheral vascular disease [FreeTextEntry3] : Dr. Bisi Galicia

## 2023-12-29 NOTE — HISTORY OF PRESENT ILLNESS
[FreeTextEntry1] : 69 yo M referred by Dr. Bisi Galicia for PAD and CV evaluation. I see her  for cardiac conditions. She has CAD, PAD, COPD, HTN, HLD, tobacco use  12/2023 VISIT: 1/2 ppd. some exertional chest pain but primarily limited by claudication. CCTA showed hemodynamically significant LAD disease. CT A/P showed bilateral iliac and R>L fempop disease. she has symmetric symptoms.   10/2023 VISIT: stable symptoms. still 1 ppd tobacco. [facility] service reached out.  interim testing with  BOLA right 0.51, left 0.45  mild carotid disease  Echo: preserved biventricular function, mild valve disease, mild LAE. ectopy.   8/2023 INITIAL VISIT HISTORICAL REFERENCE: she reports chest discomfort for past 2 years both exertional and non exertional. no dyspnea when she walks. can go upstairs without difficulty. can walk 500 feet down street limited by right knee pain. still 1 ppd tobacco.  for PAD: has symmetric calf burning but she pushes through it. the burning starts after 50 feet. does report lifestyle would be better if this symptom did not exist. no wounds or ulcers.  PET CT recently without malignancy.  pertinent physical exam: thin, 1+ b/l cfa, warm, faint distal pulses (monophasic R DP/PT, biphasic L PT), scattered wheezing

## 2024-01-04 ENCOUNTER — NON-APPOINTMENT (OUTPATIENT)
Age: 69
End: 2024-01-04

## 2024-02-12 ENCOUNTER — NON-APPOINTMENT (OUTPATIENT)
Age: 69
End: 2024-02-12

## 2024-02-12 ENCOUNTER — LABORATORY RESULT (OUTPATIENT)
Age: 69
End: 2024-02-12

## 2024-02-14 ENCOUNTER — NON-APPOINTMENT (OUTPATIENT)
Age: 69
End: 2024-02-14

## 2024-02-16 RX ORDER — CILOSTAZOL 50 MG/1
50 TABLET ORAL
Qty: 180 | Refills: 1 | Status: DISCONTINUED | COMMUNITY
Start: 2023-12-29 | End: 2024-02-16

## 2024-02-16 RX ORDER — CLOPIDOGREL BISULFATE 75 MG/1
75 TABLET, FILM COATED ORAL DAILY
Qty: 90 | Refills: 2 | Status: ACTIVE | COMMUNITY
Start: 1900-01-01 | End: 1900-01-01

## 2024-02-18 ENCOUNTER — NON-APPOINTMENT (OUTPATIENT)
Age: 69
End: 2024-02-18

## 2024-02-21 ENCOUNTER — APPOINTMENT (OUTPATIENT)
Dept: CARDIOLOGY | Facility: CLINIC | Age: 69
End: 2024-02-21
Payer: MEDICARE

## 2024-02-21 ENCOUNTER — NON-APPOINTMENT (OUTPATIENT)
Age: 69
End: 2024-02-21

## 2024-02-21 VITALS
SYSTOLIC BLOOD PRESSURE: 146 MMHG | HEIGHT: 63 IN | HEART RATE: 67 BPM | WEIGHT: 109 LBS | DIASTOLIC BLOOD PRESSURE: 72 MMHG | BODY MASS INDEX: 19.31 KG/M2 | OXYGEN SATURATION: 97 %

## 2024-02-21 PROCEDURE — 93000 ELECTROCARDIOGRAM COMPLETE: CPT

## 2024-02-21 PROCEDURE — G2211 COMPLEX E/M VISIT ADD ON: CPT

## 2024-02-21 PROCEDURE — 99215 OFFICE O/P EST HI 40 MIN: CPT

## 2024-02-21 NOTE — DISCUSSION/SUMMARY
[FreeTextEntry1] : 67 yo M referred by Dr. Bisi Galicia for PAD and CV evaluation. I see her  for cardiac conditions. She has CAD PCI to LAD 2/2024, PAD, COPD, HTN, former tobacco use.   She underwent PCI of a hemodynamically significant mid LAD lesion. On DAPT, statin, continued tobacco cessation quit 2/2024. Follow in cardiac rehab.    She has lifestyle limiting claudication and findings of multilevel lower extremity PAD as detailed above. She has multifactorial functional limitation. We will proceed with iliac interventions in a couple months. She will do exercise therapy and continue without tobacco use. Defer on cilostazol in setting of DAPT.   She has wheezing on exam, likely has some component of pulm disease. Defer to PCP. for diffuse inflammatory symptoms, rheumatology eval recommended  Follow after procedure. ER precautions given to patient.

## 2024-02-21 NOTE — HISTORY OF PRESENT ILLNESS
[FreeTextEntry1] : 69 yo M referred by Dr. Bisi Galicia for PAD and CV evaluation. I see her  for cardiac conditions. She has CAD s/p PCI 2/2024 LAD, PAD, COPD, HTN, HLD, former tobacco use  2/2024 VISIT: stopped tobacco. bp elevated due to car issue today getting towed. underwent PCI mid LAD lesion. found to have multilevel PAD on angiogram. radial site healing well. no chest pain. still has symmetric claudication.   12/2023 VISIT: 1/2 ppd. some exertional chest pain but primarily limited by claudication. CCTA showed hemodynamically significant LAD disease. CT A/P showed bilateral iliac and R>L fempop disease. she has symmetric symptoms.   10/2023 VISIT: stable symptoms. still 1 ppd tobacco. [facility] service reached out.  interim testing with  BOLA right 0.51, left 0.45  mild carotid disease  Echo: preserved biventricular function, mild valve disease, mild LAE. ectopy.   8/2023 INITIAL VISIT HISTORICAL REFERENCE: she reports chest discomfort for past 2 years both exertional and non exertional. no dyspnea when she walks. can go upstairs without difficulty. can walk 500 feet down street limited by right knee pain. still 1 ppd tobacco.  for PAD: has symmetric calf burning but she pushes through it. the burning starts after 50 feet. does report lifestyle would be better if this symptom did not exist. no wounds or ulcers.  PET CT recently without malignancy.  pertinent physical exam: thin, 1+ b/l cfa, warm, faint distal pulses (monophasic R DP/PT, biphasic L PT), scattered wheezing

## 2024-03-10 ENCOUNTER — NON-APPOINTMENT (OUTPATIENT)
Age: 69
End: 2024-03-10

## 2024-03-11 ENCOUNTER — RX RENEWAL (OUTPATIENT)
Age: 69
End: 2024-03-11

## 2024-03-11 ENCOUNTER — NON-APPOINTMENT (OUTPATIENT)
Age: 69
End: 2024-03-11

## 2024-03-11 RX ORDER — ROSUVASTATIN CALCIUM 40 MG/1
40 TABLET, FILM COATED ORAL
Qty: 90 | Refills: 3 | Status: ACTIVE | COMMUNITY
Start: 2024-03-11 | End: 1900-01-01

## 2024-03-21 ENCOUNTER — NON-APPOINTMENT (OUTPATIENT)
Age: 69
End: 2024-03-21

## 2024-03-24 ENCOUNTER — NON-APPOINTMENT (OUTPATIENT)
Age: 69
End: 2024-03-24

## 2024-03-25 LAB
ANION GAP SERPL CALC-SCNC: 11 MMOL/L
BASOPHILS # BLD AUTO: 0.03 K/UL
BASOPHILS NFR BLD AUTO: 0.5 %
BUN SERPL-MCNC: 13 MG/DL
CALCIUM SERPL-MCNC: 9.4 MG/DL
CHLORIDE SERPL-SCNC: 104 MMOL/L
CO2 SERPL-SCNC: 26 MMOL/L
CREAT SERPL-MCNC: 0.56 MG/DL
EGFR: 99 ML/MIN/1.73M2
EOSINOPHIL # BLD AUTO: 0.16 K/UL
EOSINOPHIL NFR BLD AUTO: 2.5 %
GLUCOSE SERPL-MCNC: 112 MG/DL
HCT VFR BLD CALC: 38.4 %
HGB BLD-MCNC: 12.1 G/DL
IMM GRANULOCYTES NFR BLD AUTO: 0.3 %
LYMPHOCYTES # BLD AUTO: 1.59 K/UL
LYMPHOCYTES NFR BLD AUTO: 24.4 %
MAN DIFF?: NORMAL
MCHC RBC-ENTMCNC: 28.4 PG
MCHC RBC-ENTMCNC: 31.5 GM/DL
MCV RBC AUTO: 90.1 FL
MONOCYTES # BLD AUTO: 0.71 K/UL
MONOCYTES NFR BLD AUTO: 10.9 %
NEUTROPHILS # BLD AUTO: 4 K/UL
NEUTROPHILS NFR BLD AUTO: 61.4 %
PLATELET # BLD AUTO: 389 K/UL
POTASSIUM SERPL-SCNC: 5.1 MMOL/L
RBC # BLD: 4.26 M/UL
RBC # FLD: 14.4 %
SODIUM SERPL-SCNC: 141 MMOL/L
WBC # FLD AUTO: 6.51 K/UL

## 2024-04-15 ENCOUNTER — APPOINTMENT (OUTPATIENT)
Dept: CARDIOLOGY | Facility: CLINIC | Age: 69
End: 2024-04-15

## 2024-05-15 ENCOUNTER — APPOINTMENT (OUTPATIENT)
Dept: CARDIOLOGY | Facility: CLINIC | Age: 69
End: 2024-05-15
Payer: MEDICARE

## 2024-05-15 PROCEDURE — 93925 LOWER EXTREMITY STUDY: CPT

## 2024-05-15 PROCEDURE — 93923 UPR/LXTR ART STDY 3+ LVLS: CPT | Mod: 1L

## 2024-05-21 PROBLEM — E78.00 HIGH CHOLESTEROL: Status: ACTIVE | Noted: 2017-09-12

## 2024-05-21 PROBLEM — F17.200 CURRENT EVERY DAY SMOKER: Status: RESOLVED | Noted: 2017-08-12 | Resolved: 2024-05-21

## 2024-05-22 ENCOUNTER — APPOINTMENT (OUTPATIENT)
Dept: CARDIOLOGY | Facility: CLINIC | Age: 69
End: 2024-05-22
Payer: MEDICARE

## 2024-05-22 VITALS
WEIGHT: 107 LBS | DIASTOLIC BLOOD PRESSURE: 62 MMHG | OXYGEN SATURATION: 100 % | BODY MASS INDEX: 18.96 KG/M2 | HEIGHT: 63 IN | HEART RATE: 97 BPM | SYSTOLIC BLOOD PRESSURE: 134 MMHG

## 2024-05-22 DIAGNOSIS — F17.200 NICOTINE DEPENDENCE, UNSPECIFIED, UNCOMPLICATED: ICD-10-CM

## 2024-05-22 DIAGNOSIS — E78.00 PURE HYPERCHOLESTEROLEMIA, UNSPECIFIED: ICD-10-CM

## 2024-05-22 PROCEDURE — 99215 OFFICE O/P EST HI 40 MIN: CPT

## 2024-05-22 RX ORDER — PANTOPRAZOLE 40 MG/1
40 TABLET, DELAYED RELEASE ORAL
Qty: 90 | Refills: 2 | Status: ACTIVE | COMMUNITY
Start: 2024-02-16 | End: 1900-01-01

## 2024-05-22 NOTE — DISCUSSION/SUMMARY
[FreeTextEntry1] : 67 yo M referred by Dr. Bisi Galicia for PAD and CV evaluation. I see her  for cardiac conditions. She has CAD PCI to LAD 2/2024, multilevel PAD R iliac PVI 2/2024, COPD, HTN, former tobacco use.   She underwent PCI of a hemodynamically significant mid LAD lesion. On DAPT, statin, continued tobacco cessation quit 2/2024 but she is vaping now.  Declines cardiac rehab due to logistics.  She has lifestyle limiting claudication and findings of multilevel lower extremity PAD as detailed above. She has multifactorial functional limitation as well. We proceeded with right iliac intervention 2/2024 and BOLA increased appropriately to 0.7.   I believe the best thing to do is trial cilostazol 50 twice a day for a couple weeks and then 100 twice a day thereafter.  Decrease vaping.  Exercise is much as possible to plump up the collaterals.  Her left leg disease has an occlusion over the knee joint which is not ideal to stent given risk of stent fracture.  My hope is to improve rest pain and by time to increase durability of any intervention.   She has wheezing on exam, likely has some component of pulm disease. Defer to PCP. for diffuse inflammatory symptoms, rheumatology eval recommended  Follow in 6 weeks after cilostazol trial. ER precautions given to patient.

## 2024-05-22 NOTE — HISTORY OF PRESENT ILLNESS
[FreeTextEntry1] : 70 yo M referred by Dr. Bisi Galicia for PAD and CV evaluation. I see her  for cardiac conditions. She has CAD s/p PCI 2/2024 LAD, multilevel PAD R iliac intervention 2/2024, COPD, HTN, HLD, former tobacco use  5/2024 VISIT: R iliac intervention performed has 2+ bilateral CFA pulses now.. underwent PAD testing patent right iliac stent BOLA right improvement from 0.51 to 0.68.  Has resting left foot pain worse with upright, better with dangling off the bed.  No wounds or ulcers.  2/2024 VISIT: stopped tobacco. bp elevated due to car issue today getting towed. underwent PCI mid LAD lesion. found to have multilevel PAD on angiogram. radial site healing well. no chest pain. still has symmetric claudication.   12/2023 VISIT: 1/2 ppd. some exertional chest pain but primarily limited by claudication. CCTA showed hemodynamically significant LAD disease. CT A/P showed bilateral iliac and R>L fempop disease. she has symmetric symptoms.   10/2023 VISIT: stable symptoms. still 1 ppd tobacco. [facility] service reached out.  interim testing with  BOLA right 0.51, left 0.45  mild carotid disease  Echo: preserved biventricular function, mild valve disease, mild LAE. ectopy.   8/2023 INITIAL VISIT HISTORICAL REFERENCE: she reports chest discomfort for past 2 years both exertional and non exertional. no dyspnea when she walks. can go upstairs without difficulty. can walk 500 feet down street limited by right knee pain. still 1 ppd tobacco.  for PAD: has symmetric calf burning but she pushes through it. the burning starts after 50 feet. does report lifestyle would be better if this symptom did not exist. no wounds or ulcers.  PET CT recently without malignancy.  pertinent physical exam: thin, 1+ b/l cfa, warm, faint distal pulses (monophasic R DP/PT, biphasic L PT), scattered wheezing

## 2024-07-01 ENCOUNTER — APPOINTMENT (OUTPATIENT)
Dept: CARDIOLOGY | Facility: CLINIC | Age: 69
End: 2024-07-01
Payer: MEDICARE

## 2024-07-01 VITALS
SYSTOLIC BLOOD PRESSURE: 140 MMHG | DIASTOLIC BLOOD PRESSURE: 60 MMHG | HEART RATE: 67 BPM | WEIGHT: 108 LBS | HEIGHT: 63 IN | OXYGEN SATURATION: 98 % | BODY MASS INDEX: 19.14 KG/M2

## 2024-07-01 DIAGNOSIS — I73.9 PERIPHERAL VASCULAR DISEASE, UNSPECIFIED: ICD-10-CM

## 2024-07-01 DIAGNOSIS — I10 ESSENTIAL (PRIMARY) HYPERTENSION: ICD-10-CM

## 2024-07-01 DIAGNOSIS — R73.03 PREDIABETES.: ICD-10-CM

## 2024-07-01 DIAGNOSIS — Z95.5 PRESENCE OF CORONARY ANGIOPLASTY IMPLANT AND GRAFT: ICD-10-CM

## 2024-07-01 DIAGNOSIS — M79.606 PAIN IN LEG, UNSPECIFIED: ICD-10-CM

## 2024-07-01 DIAGNOSIS — I25.10 ATHEROSCLEROTIC HEART DISEASE OF NATIVE CORONARY ARTERY W/OUT ANGINA PECTORIS: ICD-10-CM

## 2024-07-01 PROCEDURE — 99215 OFFICE O/P EST HI 40 MIN: CPT

## 2024-07-01 RX ORDER — NITROGLYCERIN 20 MG/G
2 OINTMENT TOPICAL
Qty: 3 | Refills: 2 | Status: ACTIVE | COMMUNITY
Start: 2024-07-01 | End: 1900-01-01

## 2024-07-08 ENCOUNTER — NON-APPOINTMENT (OUTPATIENT)
Age: 69
End: 2024-07-08

## 2024-07-09 ENCOUNTER — OUTPATIENT (OUTPATIENT)
Dept: OUTPATIENT SERVICES | Facility: HOSPITAL | Age: 69
LOS: 1 days | End: 2024-07-09
Payer: COMMERCIAL

## 2024-07-09 ENCOUNTER — NON-APPOINTMENT (OUTPATIENT)
Age: 69
End: 2024-07-09

## 2024-07-09 DIAGNOSIS — Z98.891 HISTORY OF UTERINE SCAR FROM PREVIOUS SURGERY: Chronic | ICD-10-CM

## 2024-07-09 DIAGNOSIS — D50.9 IRON DEFICIENCY ANEMIA, UNSPECIFIED: ICD-10-CM

## 2024-07-09 DIAGNOSIS — Z98.51 TUBAL LIGATION STATUS: Chronic | ICD-10-CM

## 2024-07-09 LAB
ALBUMIN SERPL ELPH-MCNC: 4.4 G/DL
ALP BLD-CCNC: 83 U/L
ALT SERPL-CCNC: 14 U/L
ANION GAP SERPL CALC-SCNC: 12 MMOL/L
AST SERPL-CCNC: 25 U/L
BASOPHILS # BLD AUTO: 0.04 K/UL
BASOPHILS NFR BLD AUTO: 0.7 %
BILIRUB SERPL-MCNC: <0.2 MG/DL
BUN SERPL-MCNC: 22 MG/DL
CALCIUM SERPL-MCNC: 8.9 MG/DL
CHLORIDE SERPL-SCNC: 103 MMOL/L
CO2 SERPL-SCNC: 26 MMOL/L
CREAT SERPL-MCNC: 0.66 MG/DL
EGFR: 94 ML/MIN/1.73M2
EOSINOPHIL # BLD AUTO: 0.15 K/UL
EOSINOPHIL NFR BLD AUTO: 2.8 %
GLUCOSE SERPL-MCNC: 97 MG/DL
HCT VFR BLD CALC: 28.5 %
HGB BLD-MCNC: 8.3 G/DL
IMM GRANULOCYTES NFR BLD AUTO: 0.2 %
LYMPHOCYTES # BLD AUTO: 1.34 K/UL
LYMPHOCYTES NFR BLD AUTO: 24.7 %
MAN DIFF?: NORMAL
MCHC RBC-ENTMCNC: 24.1 PG
MCHC RBC-ENTMCNC: 29.1 GM/DL
MCV RBC AUTO: 82.6 FL
MONOCYTES # BLD AUTO: 0.65 K/UL
MONOCYTES NFR BLD AUTO: 12 %
NEUTROPHILS # BLD AUTO: 3.23 K/UL
NEUTROPHILS NFR BLD AUTO: 59.6 %
PLATELET # BLD AUTO: 472 K/UL
POTASSIUM SERPL-SCNC: 4.7 MMOL/L
PROT SERPL-MCNC: 6.8 G/DL
RBC # BLD: 3.45 M/UL
RBC # FLD: 14.6 %
SODIUM SERPL-SCNC: 141 MMOL/L
WBC # FLD AUTO: 5.42 K/UL

## 2024-07-10 ENCOUNTER — NON-APPOINTMENT (OUTPATIENT)
Age: 69
End: 2024-07-10

## 2024-07-10 ENCOUNTER — APPOINTMENT (OUTPATIENT)
Dept: HEMATOLOGY ONCOLOGY | Facility: CLINIC | Age: 69
End: 2024-07-10
Payer: MEDICARE

## 2024-07-10 ENCOUNTER — RESULT REVIEW (OUTPATIENT)
Age: 69
End: 2024-07-10

## 2024-07-10 ENCOUNTER — APPOINTMENT (OUTPATIENT)
Dept: ULTRASOUND IMAGING | Facility: CLINIC | Age: 69
End: 2024-07-10
Payer: MEDICARE

## 2024-07-10 ENCOUNTER — LABORATORY RESULT (OUTPATIENT)
Age: 69
End: 2024-07-10

## 2024-07-10 VITALS
BODY MASS INDEX: 19.53 KG/M2 | WEIGHT: 110.25 LBS | DIASTOLIC BLOOD PRESSURE: 68 MMHG | OXYGEN SATURATION: 96 % | TEMPERATURE: 97.6 F | HEART RATE: 77 BPM | SYSTOLIC BLOOD PRESSURE: 145 MMHG

## 2024-07-10 DIAGNOSIS — I72.9 ANEURYSM OF UNSPECIFIED SITE: ICD-10-CM

## 2024-07-10 DIAGNOSIS — D64.9 ANEMIA, UNSPECIFIED: ICD-10-CM

## 2024-07-10 LAB
BASOPHILS # BLD AUTO: 0.04 K/UL — SIGNIFICANT CHANGE UP (ref 0–0.2)
BASOPHILS NFR BLD AUTO: 0.7 % — SIGNIFICANT CHANGE UP (ref 0–2)
EOSINOPHIL # BLD AUTO: 0.14 K/UL — SIGNIFICANT CHANGE UP (ref 0–0.5)
EOSINOPHIL NFR BLD AUTO: 2.3 % — SIGNIFICANT CHANGE UP (ref 0–6)
HCT VFR BLD CALC: 29.1 % — LOW (ref 34.5–45)
HGB BLD-MCNC: 9 G/DL — LOW (ref 11.5–15.5)
IMM GRANULOCYTES NFR BLD AUTO: 0.3 % — SIGNIFICANT CHANGE UP (ref 0–0.9)
LYMPHOCYTES # BLD AUTO: 1.1 K/UL — SIGNIFICANT CHANGE UP (ref 1–3.3)
LYMPHOCYTES # BLD AUTO: 18.4 % — SIGNIFICANT CHANGE UP (ref 13–44)
MCHC RBC-ENTMCNC: 24.2 PG — LOW (ref 27–34)
MCHC RBC-ENTMCNC: 30.9 GM/DL — LOW (ref 32–36)
MCV RBC AUTO: 78.2 FL — LOW (ref 80–100)
MONOCYTES # BLD AUTO: 0.66 K/UL — SIGNIFICANT CHANGE UP (ref 0–0.9)
MONOCYTES NFR BLD AUTO: 11.1 % — SIGNIFICANT CHANGE UP (ref 2–14)
NEUTROPHILS # BLD AUTO: 4.01 K/UL — SIGNIFICANT CHANGE UP (ref 1.8–7.4)
NEUTROPHILS NFR BLD AUTO: 67.2 % — SIGNIFICANT CHANGE UP (ref 43–77)
NRBC # BLD: 0 /100 WBCS — SIGNIFICANT CHANGE UP (ref 0–0)
NRBC BLD-RTO: 0 /100 WBCS — SIGNIFICANT CHANGE UP (ref 0–0)
PLATELET # BLD AUTO: 445 K/UL — HIGH (ref 150–400)
RBC # BLD: 3.68 M/UL
RBC # BLD: 3.72 M/UL — LOW (ref 3.8–5.2)
RBC # FLD: 14.4 % — SIGNIFICANT CHANGE UP (ref 10.3–14.5)
RETICS # AUTO: 1.7 %
RETICS AGGREG/RBC NFR: 62.2 K/UL
WBC # BLD: 5.97 K/UL — SIGNIFICANT CHANGE UP (ref 3.8–10.5)
WBC # FLD AUTO: 5.97 K/UL — SIGNIFICANT CHANGE UP (ref 3.8–10.5)

## 2024-07-10 PROCEDURE — G2211 COMPLEX E/M VISIT ADD ON: CPT

## 2024-07-10 PROCEDURE — 99204 OFFICE O/P NEW MOD 45 MIN: CPT

## 2024-07-10 PROCEDURE — 93926 LOWER EXTREMITY STUDY: CPT | Mod: LT

## 2024-07-11 ENCOUNTER — APPOINTMENT (OUTPATIENT)
Dept: HEMATOLOGY ONCOLOGY | Facility: CLINIC | Age: 69
End: 2024-07-11

## 2024-07-11 ENCOUNTER — NON-APPOINTMENT (OUTPATIENT)
Age: 69
End: 2024-07-11

## 2024-07-11 DIAGNOSIS — E87.5 HYPERKALEMIA: ICD-10-CM

## 2024-07-11 LAB
ALBUMIN SERPL ELPH-MCNC: 4.7 G/DL
ALP BLD-CCNC: 76 U/L
ALT SERPL-CCNC: 15 U/L
ANION GAP SERPL CALC-SCNC: 14 MMOL/L
APPEARANCE: CLEAR
AST SERPL-CCNC: 24 U/L
BILIRUB SERPL-MCNC: 0.2 MG/DL
BILIRUBIN URINE: NEGATIVE
BUN SERPL-MCNC: 12 MG/DL
CALCIUM SERPL-MCNC: 7.2 MG/DL
CHLORIDE SERPL-SCNC: 104 MMOL/L
CO2 SERPL-SCNC: 24 MMOL/L
COLOR: YELLOW
CREAT SERPL-MCNC: 0.51 MG/DL
DEPRECATED KAPPA LC FREE/LAMBDA SER: 1 RATIO
EGFR: 100 ML/MIN/1.73M2
FERRITIN SERPL-MCNC: 11 NG/ML
FOLATE SERPL-MCNC: 18.3 NG/ML
GLUCOSE SERPL-MCNC: 105 MG/DL
IGA SER QL IEP: 172 MG/DL
IGG SER QL IEP: 821 MG/DL
IGM SER QL IEP: 136 MG/DL
IRON SATN MFR SERPL: 2 %
IRON SERPL-MCNC: 11 UG/DL
KAPPA LC CSF-MCNC: 1.89 MG/DL
KAPPA LC SERPL-MCNC: 1.89 MG/DL
KETONES URINE: NEGATIVE MG/DL
LEUKOCYTE ESTERASE URINE: NEGATIVE
NITRITE URINE: NEGATIVE
PH URINE: 8
POTASSIUM SERPL-SCNC: 6.2 MMOL/L
PROT SERPL-MCNC: 7.2 G/DL
PROTEIN URINE: NEGATIVE MG/DL
SODIUM SERPL-SCNC: 142 MMOL/L
SPECIFIC GRAVITY URINE: 1.01
TIBC SERPL-MCNC: 494 UG/DL
UIBC SERPL-MCNC: 483 UG/DL
UROBILINOGEN URINE: 0.2 MG/DL
VIT B12 SERPL-MCNC: 649 PG/ML

## 2024-07-13 ENCOUNTER — NON-APPOINTMENT (OUTPATIENT)
Age: 69
End: 2024-07-13

## 2024-07-14 LAB — TRANSFERRIN SERPL-MCNC: 393 MG/DL

## 2024-07-16 LAB
ALBUMIN MFR SERPL ELPH: 58.6 %
ALBUMIN SERPL-MCNC: 4.2 G/DL
ALBUMIN/GLOB SERPL: 1.4 RATIO
ALPHA1 GLOB MFR SERPL ELPH: 5.7 %
ALPHA1 GLOB SERPL ELPH-MCNC: 0.4 G/DL
ALPHA2 GLOB MFR SERPL ELPH: 10.7 %
ALPHA2 GLOB SERPL ELPH-MCNC: 0.8 G/DL
B-GLOBULIN MFR SERPL ELPH: 12 %
B-GLOBULIN SERPL ELPH-MCNC: 0.9 G/DL
GAMMA GLOB FLD ELPH-MCNC: 0.9 G/DL
GAMMA GLOB MFR SERPL ELPH: 13 %
INTERPRETATION SERPL IEP-IMP: NORMAL
PROT SERPL-MCNC: 7.2 G/DL
PROT SERPL-MCNC: 7.2 G/DL

## 2024-07-17 ENCOUNTER — APPOINTMENT (OUTPATIENT)
Dept: CARDIOLOGY | Facility: CLINIC | Age: 69
End: 2024-07-17

## 2024-07-18 ENCOUNTER — NON-APPOINTMENT (OUTPATIENT)
Age: 69
End: 2024-07-18

## 2024-07-20 ENCOUNTER — NON-APPOINTMENT (OUTPATIENT)
Age: 69
End: 2024-07-20

## 2024-07-22 ENCOUNTER — APPOINTMENT (OUTPATIENT)
Dept: INFUSION THERAPY | Facility: CANCER CENTER | Age: 69
End: 2024-07-22

## 2024-07-25 DIAGNOSIS — I73.9 PERIPHERAL VASCULAR DISEASE, UNSPECIFIED: ICD-10-CM

## 2024-07-31 ENCOUNTER — NON-APPOINTMENT (OUTPATIENT)
Age: 69
End: 2024-07-31

## 2024-07-31 ENCOUNTER — LABORATORY RESULT (OUTPATIENT)
Age: 69
End: 2024-07-31

## 2024-07-31 ENCOUNTER — APPOINTMENT (OUTPATIENT)
Dept: INFUSION THERAPY | Facility: CANCER CENTER | Age: 69
End: 2024-07-31

## 2024-08-05 ENCOUNTER — APPOINTMENT (OUTPATIENT)
Dept: ULTRASOUND IMAGING | Facility: CLINIC | Age: 69
End: 2024-08-05

## 2024-08-06 ENCOUNTER — NON-APPOINTMENT (OUTPATIENT)
Age: 69
End: 2024-08-06

## 2024-08-06 ENCOUNTER — APPOINTMENT (OUTPATIENT)
Dept: INFUSION THERAPY | Facility: CANCER CENTER | Age: 69
End: 2024-08-06

## 2024-08-07 ENCOUNTER — APPOINTMENT (OUTPATIENT)
Dept: HEMATOLOGY ONCOLOGY | Facility: CLINIC | Age: 69
End: 2024-08-07

## 2024-08-07 ENCOUNTER — RESULT REVIEW (OUTPATIENT)
Age: 69
End: 2024-08-07

## 2024-08-07 PROBLEM — D75.839 THROMBOCYTOSIS: Status: ACTIVE | Noted: 2024-08-07

## 2024-08-07 LAB
BASOPHILS # BLD AUTO: 0.06 K/UL — SIGNIFICANT CHANGE UP (ref 0–0.2)
BASOPHILS NFR BLD AUTO: 1 % — SIGNIFICANT CHANGE UP (ref 0–2)
EOSINOPHIL # BLD AUTO: 0.04 K/UL — SIGNIFICANT CHANGE UP (ref 0–0.5)
EOSINOPHIL NFR BLD AUTO: 0.6 % — SIGNIFICANT CHANGE UP (ref 0–6)
HCT VFR BLD CALC: 30.5 % — LOW (ref 34.5–45)
HGB BLD-MCNC: 9.4 G/DL — LOW (ref 11.5–15.5)
IMM GRANULOCYTES NFR BLD AUTO: 0.3 % — SIGNIFICANT CHANGE UP (ref 0–0.9)
LYMPHOCYTES # BLD AUTO: 1.17 K/UL — SIGNIFICANT CHANGE UP (ref 1–3.3)
LYMPHOCYTES # BLD AUTO: 18.8 % — SIGNIFICANT CHANGE UP (ref 13–44)
MCHC RBC-ENTMCNC: 24.3 PG — LOW (ref 27–34)
MCHC RBC-ENTMCNC: 30.8 GM/DL — LOW (ref 32–36)
MCV RBC AUTO: 78.8 FL — LOW (ref 80–100)
MONOCYTES # BLD AUTO: 0.53 K/UL — SIGNIFICANT CHANGE UP (ref 0–0.9)
MONOCYTES NFR BLD AUTO: 8.5 % — SIGNIFICANT CHANGE UP (ref 2–14)
NEUTROPHILS # BLD AUTO: 4.42 K/UL — SIGNIFICANT CHANGE UP (ref 1.8–7.4)
NEUTROPHILS NFR BLD AUTO: 70.8 % — SIGNIFICANT CHANGE UP (ref 43–77)
NRBC # BLD: 0 /100 WBCS — SIGNIFICANT CHANGE UP (ref 0–0)
NRBC BLD-RTO: 0 /100 WBCS — SIGNIFICANT CHANGE UP (ref 0–0)
PLATELET # BLD AUTO: 498 K/UL — HIGH (ref 150–400)
RBC # BLD: 3.87 M/UL — SIGNIFICANT CHANGE UP (ref 3.8–5.2)
RBC # FLD: 19.5 % — HIGH (ref 10.3–14.5)
WBC # BLD: 6.24 K/UL — SIGNIFICANT CHANGE UP (ref 3.8–10.5)
WBC # FLD AUTO: 6.24 K/UL — SIGNIFICANT CHANGE UP (ref 3.8–10.5)

## 2024-08-07 PROCEDURE — G2211 COMPLEX E/M VISIT ADD ON: CPT

## 2024-08-07 PROCEDURE — 99214 OFFICE O/P EST MOD 30 MIN: CPT

## 2024-08-07 NOTE — CONSULT LETTER
[Dear  ___] : Dear  [unfilled], [Consult Closing:] : Thank you very much for allowing me to participate in the care of this patient.  If you have any questions, please do not hesitate to contact me. [Sincerely,] : Sincerely, [DrAlisa  ___] : Dr. NUÑEZ [Courtesy Letter:] : I had the pleasure of seeing your patient, [unfilled], in my office today. [FreeTextEntry3] : Terri Farnswroth, OLESYA, ANP-c

## 2024-08-07 NOTE — HISTORY OF PRESENT ILLNESS
[de-identified] : Referred by: Dr. Wai Agrawal PCP: Dr. Bisi Galicia   MARY ARREGUIN presented at age 69 year on 07/10/2024 for evaluation of anemia. She reports that prior to her last vascular procedure the levels were normal. She was scheduled for vascular procedure tomorrow, however pre op labs showed the anemia. She reports that she did see a hematologist, Dr. Angie Canales, in the past due to elevated platelets. Work up was negative per pt. 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 She was on Plavix, now on HOLD. She is reporting fatigue and tiredness, but denies dark stools, hematuria or blood per rectum. Denies SOB, chest pain or dizziness.  Recent laboratory studies reviewed and notable for: HGB= 8.3, HCT= 28.5, WBC= 5.42, Plt= 472, MCV= 82.6, RBC= 3.45   HCM: - Colonoscopy: 15 years ago- Dr. Caputo, 2 polyps - Gyn: up to date - Mammo: Annual - Lung cancer screen: PEY, CXR - DEXA: Up to date   SH: - Occupation: Work for [employer] - Living situation: Lives with  - Smoking/Etoh/illicit drugs: + smoking, + ETOH, no drugs   FH: Mother-  age 67, metastatic breast cancer  Father-  age 61, rare neurological disorder 1 sister- alive and well 1 sister- alive and well 1 brother- alive, age 62, cardiac stents, CAD 1 brother- alive, age 67, CAD, obese 2 sons- alive ages 39 and 48- healthy    [de-identified] : Feels well. No new complaints. Had recent endo/colo on 7/18/24 with Dr. Briggs. There were some bleeding angioectasias which were cauterized. Denies bleeding or dark stools.

## 2024-08-07 NOTE — RESULTS/DATA
[FreeTextEntry1] : MARY ARREGUIN is a 69 year old female who presents for follow up evaluation of anemia.

## 2024-08-07 NOTE — REVIEW OF SYSTEMS
[Night Sweats] : night sweats [Fatigue] : fatigue [Leg Claudication] : intermittent leg claudication [Wheezing] : wheezing [Diarrhea: Grade 0] : Diarrhea: Grade 0 [Joint Pain] : joint pain [Joint Stiffness] : joint stiffness [Muscle Pain] : muscle pain [Muscle Weakness] : muscle weakness [Easy Bruising] : a tendency for easy bruising [Fever] : no fever [Chills] : no chills [Recent Change In Weight] : ~T no recent weight change [Nosebleeds] : no nosebleeds [Chest Pain] : no chest pain [Palpitations] : no palpitations [Lower Ext Edema] : no lower extremity edema [Shortness Of Breath] : no shortness of breath [Cough] : no cough [SOB on Exertion] : no shortness of breath during exertion [Abdominal Pain] : no abdominal pain [Vomiting] : no vomiting [Constipation] : no constipation [Dysuria] : no dysuria [Skin Rash] : no skin rash [Skin Wound] : no skin wound [Confused] : no confusion [Dizziness] : no dizziness [Fainting] : no fainting [Insomnia] : no insomnia [Difficulty Walking] : no difficulty walking [Easy Bleeding] : no tendency for easy bleeding [Swollen Glands] : no swollen glands [FreeTextEntry5] : left leg

## 2024-08-13 ENCOUNTER — APPOINTMENT (OUTPATIENT)
Dept: INFUSION THERAPY | Facility: CANCER CENTER | Age: 69
End: 2024-08-13

## 2024-08-18 ENCOUNTER — NON-APPOINTMENT (OUTPATIENT)
Age: 69
End: 2024-08-18

## 2024-08-19 ENCOUNTER — NON-APPOINTMENT (OUTPATIENT)
Age: 69
End: 2024-08-19

## 2024-08-19 ENCOUNTER — APPOINTMENT (OUTPATIENT)
Dept: CARDIOLOGY | Facility: CLINIC | Age: 69
End: 2024-08-19

## 2024-08-19 ENCOUNTER — LABORATORY RESULT (OUTPATIENT)
Age: 69
End: 2024-08-19

## 2024-08-19 VITALS
SYSTOLIC BLOOD PRESSURE: 122 MMHG | HEIGHT: 63 IN | DIASTOLIC BLOOD PRESSURE: 66 MMHG | BODY MASS INDEX: 19.84 KG/M2 | WEIGHT: 112 LBS | OXYGEN SATURATION: 97 % | HEART RATE: 72 BPM

## 2024-08-19 DIAGNOSIS — Z95.5 PRESENCE OF CORONARY ANGIOPLASTY IMPLANT AND GRAFT: ICD-10-CM

## 2024-08-19 DIAGNOSIS — I25.10 ATHEROSCLEROTIC HEART DISEASE OF NATIVE CORONARY ARTERY W/OUT ANGINA PECTORIS: ICD-10-CM

## 2024-08-19 PROCEDURE — 99215 OFFICE O/P EST HI 40 MIN: CPT

## 2024-08-19 NOTE — HISTORY OF PRESENT ILLNESS
[FreeTextEntry1] : 70 yo M referred by Dr. Bisi Galicia for PAD and CV evaluation. She has CAD s/p PCI 2/2024 LAD, multilevel PAD R iliac intervention 2/2024, COPD, HTN, HLD, former tobacco use  8/2024 VISIT: unfortunately had to postpone peripheral angiogram due to GI bleeding. same symptoms rest pain left foot. tolerating dapt now.   7/2024 VISIT: rest pain of left foot. dampened flow. RCFA pulse 2+ and biphasic distal pulses. right leg ok. vaping. cilostazol did not help.   5/2024 VISIT: R iliac intervention performed has 2+ bilateral CFA pulses now.. underwent PAD testing patent right iliac stent BOLA right improvement from 0.51 to 0.68.  Has resting left foot pain worse with upright, better with dangling off the bed.  No wounds or ulcers.  2/2024 VISIT: stopped tobacco. bp elevated due to car issue today getting towed. underwent PCI mid LAD lesion. found to have multilevel PAD on angiogram. radial site healing well. no chest pain. still has symmetric claudication.   12/2023 VISIT: 1/2 ppd. some exertional chest pain but primarily limited by claudication. CCTA showed hemodynamically significant LAD disease. CT A/P showed bilateral iliac and R>L fempop disease. she has symmetric symptoms.   10/2023 VISIT: stable symptoms. still 1 ppd tobacco. [facility]  interim testing with  BOLA right 0.51, left 0.45  mild carotid disease  Echo: preserved biventricular function, mild valve disease, mild LAE. ectopy.   8/2023 INITIAL VISIT HISTORICAL REFERENCE: she reports chest discomfort for past 2 years both exertional and non exertional. no dyspnea when she walks. can go upstairs without difficulty. can walk 500 feet down street limited by right knee pain. still 1 ppd tobacco.  for PAD: has symmetric calf burning but she pushes through it. the burning starts after 50 feet. does report lifestyle would be better if this symptom did not exist. no wounds or ulcers.  PET CT recently without malignancy.  pertinent physical exam: thin, 1+ b/l cfa, warm, faint distal pulses (monophasic R DP/PT, biphasic L PT), scattered wheezing

## 2024-08-19 NOTE — DISCUSSION/SUMMARY
ECHO looks okay. Mildly elevated pulmonary pressure--not likely to reflect anything significant. Agree with regular BP monitoring particularly when she is resting,relaxed.  If CP recurs, will plan a pharmacologic stress test although calcium score was low in last few years/smr   [FreeTextEntry1] : 68 yo M referred by Dr. Bisi Galicia for PAD and CV evaluation. She has CAD PCI to LAD 2/2024, multilevel PAD R iliac PVI 2/2024, COPD, HTN, former tobacco use.   She is doing well from a coronary standpoint however is severely limited by ischemic rest pain primarily left foot. She has advanced multilevel disease. She was scheduled for PVI Left iliac in hopes this would relieve rest pain and we can med manage the rest. Unfortunately, she developed GI bleed from AVMs and hemorrhoids underwent ablation. She has seen hematology and is on iron.   She is tolerating DAPT again! Therefore we will proceed with left iliac intervention and see if this is enough to get her out of rest pain. Her right leg is much improved after iliac intervention plan to med manage the rest on right. She trialed cilostazol and did not work. She needs to stop vaping.  On, statin, continued tobacco cessation quit 2/2024 but she is vaping now.  Declines cardiac rehab due to logistics. BP improves with pain control.   She has wheezing on exam, likely has some component of pulm disease. Defer to PCP. for diffuse inflammatory symptoms, rheumatology eval recommended.   Follow after procedure. ER precautions given to patient.

## 2024-08-19 NOTE — HISTORY OF PRESENT ILLNESS
[FreeTextEntry1] : 68 yo M referred by Dr. Bisi Galicia for PAD and CV evaluation. She has CAD s/p PCI 2/2024 LAD, multilevel PAD R iliac intervention 2/2024, COPD, HTN, HLD, former tobacco use  8/2024 VISIT: unfortunately had to postpone peripheral angiogram due to GI bleeding. same symptoms rest pain left foot. tolerating dapt now.   7/2024 VISIT: rest pain of left foot. dampened flow. RCFA pulse 2+ and biphasic distal pulses. right leg ok. vaping. cilostazol did not help.   5/2024 VISIT: R iliac intervention performed has 2+ bilateral CFA pulses now.. underwent PAD testing patent right iliac stent BOLA right improvement from 0.51 to 0.68.  Has resting left foot pain worse with upright, better with dangling off the bed.  No wounds or ulcers.  2/2024 VISIT: stopped tobacco. bp elevated due to car issue today getting towed. underwent PCI mid LAD lesion. found to have multilevel PAD on angiogram. radial site healing well. no chest pain. still has symmetric claudication.   12/2023 VISIT: 1/2 ppd. some exertional chest pain but primarily limited by claudication. CCTA showed hemodynamically significant LAD disease. CT A/P showed bilateral iliac and R>L fempop disease. she has symmetric symptoms.   10/2023 VISIT: stable symptoms. still 1 ppd tobacco. [facility] service reached out.  interim testing with  BOLA right 0.51, left 0.45  mild carotid disease  Echo: preserved biventricular function, mild valve disease, mild LAE. ectopy.   8/2023 INITIAL VISIT HISTORICAL REFERENCE: she reports chest discomfort for past 2 years both exertional and non exertional. no dyspnea when she walks. can go upstairs without difficulty. can walk 500 feet down street limited by right knee pain. still 1 ppd tobacco.  for PAD: has symmetric calf burning but she pushes through it. the burning starts after 50 feet. does report lifestyle would be better if this symptom did not exist. no wounds or ulcers.  PET CT recently without malignancy.  pertinent physical exam: thin, 1+ b/l cfa, warm, faint distal pulses (monophasic R DP/PT, biphasic L PT), scattered wheezing

## 2024-08-19 NOTE — DISCUSSION/SUMMARY
[FreeTextEntry1] : 70 yo M referred by Dr. Bisi Galicia for PAD and CV evaluation. She has CAD PCI to LAD 2/2024, multilevel PAD R iliac PVI 2/2024, COPD, HTN, former tobacco use.   She is doing well from a coronary standpoint however is severely limited by ischemic rest pain primarily left foot. She has advanced multilevel disease. She was scheduled for PVI Left iliac in hopes this would relieve rest pain and we can med manage the rest. Unfortunately, she developed GI bleed from AVMs and hemorrhoids underwent ablation. She has seen hematology and is on iron.   She is tolerating DAPT again! Therefore we will proceed with left iliac intervention and see if this is enough to get her out of rest pain. Her right leg is much improved after iliac intervention plan to med manage the rest on right. She trialed cilostazol and did not work. She needs to stop vaping.  On, statin, continued tobacco cessation quit 2/2024 but she is vaping now.  Declines cardiac rehab due to logistics. BP improves with pain control.   She has wheezing on exam, likely has some component of pulm disease. Defer to PCP. for diffuse inflammatory symptoms, rheumatology eval recommended.   Follow after procedure. ER precautions given to patient.

## 2024-08-20 ENCOUNTER — APPOINTMENT (OUTPATIENT)
Dept: INFUSION THERAPY | Facility: CANCER CENTER | Age: 69
End: 2024-08-20

## 2024-08-20 ENCOUNTER — NON-APPOINTMENT (OUTPATIENT)
Age: 69
End: 2024-08-20

## 2024-08-20 ENCOUNTER — APPOINTMENT (OUTPATIENT)
Dept: VASCULAR SURGERY | Facility: CLINIC | Age: 69
End: 2024-08-20
Payer: MEDICARE

## 2024-08-20 DIAGNOSIS — I73.9 PERIPHERAL VASCULAR DISEASE, UNSPECIFIED: ICD-10-CM

## 2024-08-20 PROCEDURE — 85027 COMPLETE CBC AUTOMATED: CPT

## 2024-08-20 PROCEDURE — 96365 THER/PROPH/DIAG IV INF INIT: CPT

## 2024-08-20 PROCEDURE — 93970 EXTREMITY STUDY: CPT

## 2024-08-21 ENCOUNTER — NON-APPOINTMENT (OUTPATIENT)
Age: 69
End: 2024-08-21

## 2024-08-22 ENCOUNTER — NON-APPOINTMENT (OUTPATIENT)
Age: 69
End: 2024-08-22

## 2024-08-26 ENCOUNTER — NON-APPOINTMENT (OUTPATIENT)
Age: 69
End: 2024-08-26

## 2024-08-29 DIAGNOSIS — M79.606 PAIN IN LEG, UNSPECIFIED: ICD-10-CM

## 2024-09-05 ENCOUNTER — OUTPATIENT (OUTPATIENT)
Dept: OUTPATIENT SERVICES | Facility: HOSPITAL | Age: 69
LOS: 1 days | End: 2024-09-05

## 2024-09-05 DIAGNOSIS — Z98.51 TUBAL LIGATION STATUS: Chronic | ICD-10-CM

## 2024-09-05 DIAGNOSIS — D50.9 IRON DEFICIENCY ANEMIA, UNSPECIFIED: ICD-10-CM

## 2024-09-05 DIAGNOSIS — Z98.891 HISTORY OF UTERINE SCAR FROM PREVIOUS SURGERY: Chronic | ICD-10-CM

## 2024-09-18 ENCOUNTER — APPOINTMENT (OUTPATIENT)
Dept: CARDIOLOGY | Facility: CLINIC | Age: 69
End: 2024-09-18
Payer: MEDICARE

## 2024-09-18 VITALS
BODY MASS INDEX: 19.49 KG/M2 | WEIGHT: 110 LBS | OXYGEN SATURATION: 98 % | DIASTOLIC BLOOD PRESSURE: 74 MMHG | HEART RATE: 60 BPM | SYSTOLIC BLOOD PRESSURE: 174 MMHG

## 2024-09-18 DIAGNOSIS — Z95.5 PRESENCE OF CORONARY ANGIOPLASTY IMPLANT AND GRAFT: ICD-10-CM

## 2024-09-18 DIAGNOSIS — R73.03 PREDIABETES.: ICD-10-CM

## 2024-09-18 DIAGNOSIS — I25.10 ATHEROSCLEROTIC HEART DISEASE OF NATIVE CORONARY ARTERY W/OUT ANGINA PECTORIS: ICD-10-CM

## 2024-09-18 DIAGNOSIS — E78.00 PURE HYPERCHOLESTEROLEMIA, UNSPECIFIED: ICD-10-CM

## 2024-09-18 DIAGNOSIS — M79.606 PAIN IN LEG, UNSPECIFIED: ICD-10-CM

## 2024-09-18 DIAGNOSIS — I73.9 PERIPHERAL VASCULAR DISEASE, UNSPECIFIED: ICD-10-CM

## 2024-09-18 DIAGNOSIS — I10 ESSENTIAL (PRIMARY) HYPERTENSION: ICD-10-CM

## 2024-09-18 PROCEDURE — 93923 UPR/LXTR ART STDY 3+ LVLS: CPT

## 2024-09-18 PROCEDURE — 99215 OFFICE O/P EST HI 40 MIN: CPT

## 2024-09-18 RX ORDER — LOSARTAN POTASSIUM 25 MG/1
25 TABLET, FILM COATED ORAL DAILY
Qty: 90 | Refills: 2 | Status: ACTIVE | COMMUNITY
Start: 2024-09-18 | End: 1900-01-01

## 2024-09-18 NOTE — DISCUSSION/SUMMARY
[FreeTextEntry1] : 68 yo M referred by Dr. Bisi Galicia for PAD and CV evaluation. She has CAD PCI to LAD 2/2024, multilevel PAD R illiac PVI 2/2024 and L iliac PVI 8/2024, COPD, HTN, tobacco use.   She is doing well from a coronary standpoint. Her BP is elevated partially due to pain. We will start losartan 25 and keep log. Maintain amlodipine.   She has been limited by her PAD. Her RLE is improved after iliac intervention. We are hoping to control her ischemic rest pain solely with left iliac intervention as she has complex residual infrainguinal disease.   Unfortunately, she developed GI bleed from AVMs and underwent ablation. She has seen hematology and is on iron. Her hgb has been downtrending and is pending repeat procedures. She does need DAPT for now.   She trialed cilostazol and did not work. She unfortunately started smoking again. Declines cardiac rehab due to logistics.   She has wheezing on exam, likely has some component of pulm disease. Defer to PCP. for diffuse inflammatory symptoms, rheumatology eval recommended.   Follow in 1 month with DLA and BP log consider LLE intervention thereafter. ER precautions given to patient.

## 2024-09-18 NOTE — HISTORY OF PRESENT ILLNESS
[FreeTextEntry1] : 70 yo M referred by Dr. Bisi Galicia for PAD and CV evaluation. For historical reference: she has CAD s/p PCI 2/2024 LAD, multilevel PAD R iliac intervention 2/2024, COPD, HTN, HLD, tobacco use  9/2024 visit: left iliac intervention, leaving rest of infrainguinal complex disease. BOLA overall only slightly improved on left side. still with ischemic digit pain. LDL  85  8/2024 VISIT: unfortunately had to postpone peripheral angiogram due to GI bleeding. same symptoms rest pain left foot. tolerating dapt now.   7/2024 VISIT: rest pain of left foot. dampened flow. RCFA pulse 2+ and biphasic distal pulses. right leg ok. vaping. cilostazol did not help.   5/2024 VISIT: R iliac intervention performed has 2+ bilateral CFA pulses now.. underwent PAD testing patent right iliac stent BOLA right improvement from 0.51 to 0.68.  Has resting left foot pain worse with upright, better with dangling off the bed.  No wounds or ulcers.  2/2024 VISIT: stopped tobacco. bp elevated due to car issue today getting towed. underwent PCI mid LAD lesion. found to have multilevel PAD on angiogram. radial site healing well. no chest pain. still has symmetric claudication.   12/2023 VISIT: 1/2 ppd. some exertional chest pain but primarily limited by claudication. CCTA showed hemodynamically significant LAD disease. CT A/P showed bilateral iliac and R>L fempop disease. she has symmetric symptoms.   10/2023 VISIT: stable symptoms. still 1 ppd tobacco. [facility] service reached out.  interim testing with  BOLA right 0.51, left 0.45  mild carotid disease  Echo: preserved biventricular function, mild valve disease, mild LAE. ectopy.   8/2023 INITIAL VISIT HISTORICAL REFERENCE: she reports chest discomfort for past 2 years both exertional and non exertional. no dyspnea when she walks. can go upstairs without difficulty. can walk 500 feet down street limited by right knee pain. still 1 ppd tobacco.  for PAD: has symmetric calf burning but she pushes through it. the burning starts after 50 feet. does report lifestyle would be better if this symptom did not exist. no wounds or ulcers.  PET CT recently without malignancy.  pertinent physical exam: thin, 1+ b/l cfa, warm, faint distal pulses (monophasic R DP/PT, biphasic L PT), scattered wheezing

## 2024-09-18 NOTE — HISTORY OF PRESENT ILLNESS
[FreeTextEntry1] : 68 yo M referred by Dr. Bisi Galicia for PAD and CV evaluation. For historical reference: she has CAD s/p PCI 2/2024 LAD, multilevel PAD R iliac intervention 2/2024, COPD, HTN, HLD, tobacco use  9/2024 visit: left iliac intervention, leaving rest of infrainguinal complex disease. BOLA overall only slightly improved on left side. still with ischemic digit pain. LDL  85  8/2024 VISIT: unfortunately had to postpone peripheral angiogram due to GI bleeding. same symptoms rest pain left foot. tolerating dapt now.   7/2024 VISIT: rest pain of left foot. dampened flow. RCFA pulse 2+ and biphasic distal pulses. right leg ok. vaping. cilostazol did not help.   5/2024 VISIT: R iliac intervention performed has 2+ bilateral CFA pulses now.. underwent PAD testing patent right iliac stent BOLA right improvement from 0.51 to 0.68.  Has resting left foot pain worse with upright, better with dangling off the bed.  No wounds or ulcers.  2/2024 VISIT: stopped tobacco. bp elevated due to car issue today getting towed. underwent PCI mid LAD lesion. found to have multilevel PAD on angiogram. radial site healing well. no chest pain. still has symmetric claudication.   12/2023 VISIT: 1/2 ppd. some exertional chest pain but primarily limited by claudication. CCTA showed hemodynamically significant LAD disease. CT A/P showed bilateral iliac and R>L fempop disease. she has symmetric symptoms.   10/2023 VISIT: stable symptoms. still 1 ppd tobacco. [facility] service reached out.  interim testing with  BOLA right 0.51, left 0.45  mild carotid disease  Echo: preserved biventricular function, mild valve disease, mild LAE. ectopy.   8/2023 INITIAL VISIT HISTORICAL REFERENCE: she reports chest discomfort for past 2 years both exertional and non exertional. no dyspnea when she walks. can go upstairs without difficulty. can walk 500 feet down street limited by right knee pain. still 1 ppd tobacco.  for PAD: has symmetric calf burning but she pushes through it. the burning starts after 50 feet. does report lifestyle would be better if this symptom did not exist. no wounds or ulcers.  PET CT recently without malignancy.  pertinent physical exam: thin, 1+ b/l cfa, warm, faint distal pulses (monophasic R DP/PT, biphasic L PT), scattered wheezing

## 2024-09-24 ENCOUNTER — APPOINTMENT (OUTPATIENT)
Dept: HEMATOLOGY ONCOLOGY | Facility: CLINIC | Age: 69
End: 2024-09-24

## 2024-09-26 ENCOUNTER — NON-APPOINTMENT (OUTPATIENT)
Age: 69
End: 2024-09-26

## 2024-09-27 ENCOUNTER — APPOINTMENT (OUTPATIENT)
Dept: HEMATOLOGY ONCOLOGY | Facility: CLINIC | Age: 69
End: 2024-09-27
Payer: MEDICARE

## 2024-09-27 VITALS
TEMPERATURE: 97.9 F | HEIGHT: 63 IN | DIASTOLIC BLOOD PRESSURE: 93 MMHG | BODY MASS INDEX: 19.84 KG/M2 | WEIGHT: 112 LBS | OXYGEN SATURATION: 98 % | SYSTOLIC BLOOD PRESSURE: 164 MMHG | HEART RATE: 71 BPM

## 2024-09-27 DIAGNOSIS — D64.9 ANEMIA, UNSPECIFIED: ICD-10-CM

## 2024-09-27 PROCEDURE — 99214 OFFICE O/P EST MOD 30 MIN: CPT

## 2024-09-27 PROCEDURE — G2211 COMPLEX E/M VISIT ADD ON: CPT

## 2024-09-27 NOTE — HISTORY OF PRESENT ILLNESS
[de-identified] : Referred by: Dr. Wai Agrawal PCP: Dr. Bisi Galicia   MARY ARREGUIN presented at age 69 year on 07/10/2024 for evaluation of anemia. She reports that prior to her last vascular procedure the levels were normal. She was scheduled for vascular procedure tomorrow, however pre op labs showed the anemia. She reports that she did see a hematologist, Dr. Angie Canales, in the past due to elevated platelets. Work up was negative per pt. 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 She was on Plavix, now on HOLD. She is reporting fatigue and tiredness, but denies dark stools, hematuria or blood per rectum. Denies SOB, chest pain or dizziness.  Recent laboratory studies reviewed and notable for: HGB= 8.3, HCT= 28.5, WBC= 5.42, Plt= 472, MCV= 82.6, RBC= 3.45   HCM: - Colonoscopy: 15 years ago- Dr. Caputo, 2 polyps - Gyn: up to date - Mammo: Annual - Lung cancer screen: PEY, CXR - DEXA: Up to date   SH: - Occupation: Work for [employer] - Living situation: Lives with  - Smoking/Etoh/illicit drugs: + smoking, + ETOH, no drugs   FH: Mother-  age 67, metastatic breast cancer  Father-  age 61, rare neurological disorder 1 sister- alive and well 1 sister- alive and well 1 brother- alive, age 62, cardiac stents, CAD 1 brother- alive, age 67, CAD, obese 2 sons- alive ages 39 and 48- healthy    [de-identified] : Feels well. No new complaints.  Denies bleeding or dark stools. Had recent vascular stent placed in left leg recently.

## 2024-09-27 NOTE — REVIEW OF SYSTEMS
[Night Sweats] : night sweats [Fatigue] : fatigue [Leg Claudication] : intermittent leg claudication [Wheezing] : wheezing [Diarrhea: Grade 0] : Diarrhea: Grade 0 [Joint Pain] : joint pain [Joint Stiffness] : joint stiffness [Muscle Pain] : muscle pain [Muscle Weakness] : muscle weakness [Easy Bruising] : a tendency for easy bruising [Fever] : no fever [Chills] : no chills [Recent Change In Weight] : ~T no recent weight change [Nosebleeds] : no nosebleeds [Chest Pain] : no chest pain [Palpitations] : no palpitations [Lower Ext Edema] : no lower extremity edema [Shortness Of Breath] : no shortness of breath [Cough] : no cough [SOB on Exertion] : no shortness of breath during exertion [Abdominal Pain] : no abdominal pain [Vomiting] : no vomiting [Constipation] : no constipation [Dysuria] : no dysuria [Skin Rash] : no skin rash [Skin Wound] : no skin wound [Confused] : no confusion [Dizziness] : no dizziness [Fainting] : no fainting [Difficulty Walking] : no difficulty walking [Insomnia] : no insomnia [Easy Bleeding] : no tendency for easy bleeding [Swollen Glands] : no swollen glands [FreeTextEntry5] : left leg

## 2024-09-27 NOTE — HISTORY OF PRESENT ILLNESS
[de-identified] : Referred by: Dr. Wai Agrawal PCP: Dr. Bisi Galicia   MARY ARREGUIN presented at age 69 year on 07/10/2024 for evaluation of anemia. She reports that prior to her last vascular procedure the levels were normal. She was scheduled for vascular procedure tomorrow, however pre op labs showed the anemia. She reports that she did see a hematologist, Dr. Angie Canales, in the past due to elevated platelets. Work up was negative per pt. 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 She was on Plavix, now on HOLD. She is reporting fatigue and tiredness, but denies dark stools, hematuria or blood per rectum. Denies SOB, chest pain or dizziness.  Recent laboratory studies reviewed and notable for: HGB= 8.3, HCT= 28.5, WBC= 5.42, Plt= 472, MCV= 82.6, RBC= 3.45   HCM: - Colonoscopy: 15 years ago- Dr. Caputo, 2 polyps - Gyn: up to date - Mammo: Annual - Lung cancer screen: PEY, CXR - DEXA: Up to date   SH: - Occupation: Work for [employer] - Living situation: Lives with  - Smoking/Etoh/illicit drugs: + smoking, + ETOH, no drugs   FH: Mother-  age 67, metastatic breast cancer  Father-  age 61, rare neurological disorder 1 sister- alive and well 1 sister- alive and well 1 brother- alive, age 62, cardiac stents, CAD 1 brother- alive, age 67, CAD, obese 2 sons- alive ages 39 and 48- healthy    [de-identified] : Feels well. No new complaints.  Denies bleeding or dark stools. Had recent vascular stent placed in left leg recently.

## 2024-09-27 NOTE — CONSULT LETTER
[Dear  ___] : Dear  [unfilled], [Courtesy Letter:] : I had the pleasure of seeing your patient, [unfilled], in my office today. [Consult Closing:] : Thank you very much for allowing me to participate in the care of this patient.  If you have any questions, please do not hesitate to contact me. [Sincerely,] : Sincerely, [DrAlisa  ___] : Dr. NUÑEZ [FreeTextEntry3] : Terri Farnsworth, OLESYA, ANP-c

## 2024-09-27 NOTE — BEGINNING OF VISIT
[0] : 2) Feeling down, depressed, or hopeless: Not at all (0) [[id number]] : 0 [Pain Scale: ___] : On a scale of 1-10, today the patient's pain is a(n) [unfilled]. [Current] : Current [20 or more] : 20 or more [> 15 Years] : > 15 Years [With Patient/Caregiver] : with Patient/Caregiver

## 2024-09-27 NOTE — BEGINNING OF VISIT
[0] : 2) Feeling down, depressed, or hopeless: Not at all (0) [FLT2Cdgcp] : 0 [Pain Scale: ___] : On a scale of 1-10, today the patient's pain is a(n) [unfilled]. [Current] : Current [20 or more] : 20 or more [> 15 Years] : > 15 Years [With Patient/Caregiver] : with Patient/Caregiver

## 2024-09-30 LAB
BASOPHILS # BLD AUTO: 0.04 K/UL
BASOPHILS NFR BLD AUTO: 0.6 %
EOSINOPHIL # BLD AUTO: 0.1 K/UL
EOSINOPHIL NFR BLD AUTO: 1.4 %
FERRITIN SERPL-MCNC: 51 NG/ML
HCT VFR BLD CALC: 36.5 %
HGB BLD-MCNC: 11.7 G/DL
IMM GRANULOCYTES NFR BLD AUTO: 0.4 %
IRON SATN MFR SERPL: 11 %
IRON SERPL-MCNC: 44 UG/DL
LYMPHOCYTES # BLD AUTO: 1.65 K/UL
LYMPHOCYTES NFR BLD AUTO: 23.7 %
MAN DIFF?: NORMAL
MCHC RBC-ENTMCNC: 26.4 PG
MCHC RBC-ENTMCNC: 32.1 GM/DL
MCV RBC AUTO: 82.4 FL
MONOCYTES # BLD AUTO: 0.69 K/UL
MONOCYTES NFR BLD AUTO: 9.9 %
NEUTROPHILS # BLD AUTO: 4.46 K/UL
NEUTROPHILS NFR BLD AUTO: 64 %
PLATELET # BLD AUTO: 383 K/UL
RBC # BLD: 4.43 M/UL
RBC # FLD: 20.2 %
TIBC SERPL-MCNC: 404 UG/DL
TRANSFERRIN SERPL-MCNC: 326 MG/DL
UIBC SERPL-MCNC: 361 UG/DL
WBC # FLD AUTO: 6.97 K/UL

## 2024-10-04 NOTE — H&P PST ADULT - HEIGHT IN INCHES
PCP: Floyd Gerber MD    Last Visit 9/28/2023   Future Appointments   Date Time Provider Department Center   11/25/2024  8:30 AM Floyd Gerber MD [facility]       Requested Prescriptions     Pending Prescriptions Disp Refills    pantoprazole (PROTONIX) 40 MG tablet 90 tablet 0     Sig: Take 1 tablet by mouth daily         Other Comments: Last Refill      2

## 2024-10-21 ENCOUNTER — APPOINTMENT (OUTPATIENT)
Dept: CARDIOLOGY | Facility: CLINIC | Age: 69
End: 2024-10-21
Payer: MEDICARE

## 2024-10-21 PROCEDURE — 93925 LOWER EXTREMITY STUDY: CPT

## 2024-10-22 ENCOUNTER — APPOINTMENT (OUTPATIENT)
Dept: CARDIOLOGY | Facility: CLINIC | Age: 69
End: 2024-10-22
Payer: MEDICARE

## 2024-10-22 VITALS
BODY MASS INDEX: 20.19 KG/M2 | WEIGHT: 114 LBS | DIASTOLIC BLOOD PRESSURE: 60 MMHG | OXYGEN SATURATION: 97 % | SYSTOLIC BLOOD PRESSURE: 146 MMHG | HEART RATE: 77 BPM

## 2024-10-22 DIAGNOSIS — I10 ESSENTIAL (PRIMARY) HYPERTENSION: ICD-10-CM

## 2024-10-22 DIAGNOSIS — I25.10 ATHEROSCLEROTIC HEART DISEASE OF NATIVE CORONARY ARTERY W/OUT ANGINA PECTORIS: ICD-10-CM

## 2024-10-22 DIAGNOSIS — I73.9 PERIPHERAL VASCULAR DISEASE, UNSPECIFIED: ICD-10-CM

## 2024-10-22 DIAGNOSIS — M79.606 PAIN IN LEG, UNSPECIFIED: ICD-10-CM

## 2024-10-22 DIAGNOSIS — Z95.5 PRESENCE OF CORONARY ANGIOPLASTY IMPLANT AND GRAFT: ICD-10-CM

## 2024-10-22 DIAGNOSIS — E78.00 PURE HYPERCHOLESTEROLEMIA, UNSPECIFIED: ICD-10-CM

## 2024-10-22 PROCEDURE — 99215 OFFICE O/P EST HI 40 MIN: CPT

## 2024-10-28 ENCOUNTER — RESULT REVIEW (OUTPATIENT)
Age: 69
End: 2024-10-28

## 2024-10-28 ENCOUNTER — APPOINTMENT (OUTPATIENT)
Dept: HEMATOLOGY ONCOLOGY | Facility: CLINIC | Age: 69
End: 2024-10-28
Payer: MEDICARE

## 2024-10-28 VITALS
SYSTOLIC BLOOD PRESSURE: 168 MMHG | WEIGHT: 112 LBS | OXYGEN SATURATION: 96 % | BODY MASS INDEX: 19.84 KG/M2 | DIASTOLIC BLOOD PRESSURE: 74 MMHG | TEMPERATURE: 98 F | HEART RATE: 76 BPM | HEIGHT: 63 IN

## 2024-10-28 DIAGNOSIS — D64.9 ANEMIA, UNSPECIFIED: ICD-10-CM

## 2024-10-28 LAB
BASOPHILS # BLD AUTO: 0.04 K/UL — SIGNIFICANT CHANGE UP (ref 0–0.2)
BASOPHILS NFR BLD AUTO: 0.7 % — SIGNIFICANT CHANGE UP (ref 0–2)
EOSINOPHIL # BLD AUTO: 0.14 K/UL — SIGNIFICANT CHANGE UP (ref 0–0.5)
EOSINOPHIL NFR BLD AUTO: 2.6 % — SIGNIFICANT CHANGE UP (ref 0–6)
HCT VFR BLD CALC: 35.8 % — SIGNIFICANT CHANGE UP (ref 34.5–45)
HGB BLD-MCNC: 11.6 G/DL — SIGNIFICANT CHANGE UP (ref 11.5–15.5)
IMM GRANULOCYTES NFR BLD AUTO: 0.4 % — SIGNIFICANT CHANGE UP (ref 0–0.9)
LYMPHOCYTES # BLD AUTO: 1.42 K/UL — SIGNIFICANT CHANGE UP (ref 1–3.3)
LYMPHOCYTES # BLD AUTO: 25.9 % — SIGNIFICANT CHANGE UP (ref 13–44)
MCHC RBC-ENTMCNC: 26.7 PG — LOW (ref 27–34)
MCHC RBC-ENTMCNC: 32.4 GM/DL — SIGNIFICANT CHANGE UP (ref 32–36)
MCV RBC AUTO: 82.5 FL — SIGNIFICANT CHANGE UP (ref 80–100)
MONOCYTES # BLD AUTO: 0.67 K/UL — SIGNIFICANT CHANGE UP (ref 0–0.9)
MONOCYTES NFR BLD AUTO: 12.2 % — SIGNIFICANT CHANGE UP (ref 2–14)
NEUTROPHILS # BLD AUTO: 3.2 K/UL — SIGNIFICANT CHANGE UP (ref 1.8–7.4)
NEUTROPHILS NFR BLD AUTO: 58.2 % — SIGNIFICANT CHANGE UP (ref 43–77)
NRBC # BLD: 0 /100 WBCS — SIGNIFICANT CHANGE UP (ref 0–0)
PLATELET # BLD AUTO: 403 K/UL — HIGH (ref 150–400)
RBC # BLD: 4.34 M/UL — SIGNIFICANT CHANGE UP (ref 3.8–5.2)
RBC # FLD: 16.7 % — HIGH (ref 10.3–14.5)
WBC # BLD: 5.49 K/UL — SIGNIFICANT CHANGE UP (ref 3.8–10.5)
WBC # FLD AUTO: 5.49 K/UL — SIGNIFICANT CHANGE UP (ref 3.8–10.5)

## 2024-10-28 PROCEDURE — G2211 COMPLEX E/M VISIT ADD ON: CPT

## 2024-10-28 PROCEDURE — 85027 COMPLETE CBC AUTOMATED: CPT

## 2024-10-28 PROCEDURE — 99214 OFFICE O/P EST MOD 30 MIN: CPT

## 2024-11-01 ENCOUNTER — NON-APPOINTMENT (OUTPATIENT)
Age: 69
End: 2024-11-01

## 2024-11-04 ENCOUNTER — OUTPATIENT (OUTPATIENT)
Dept: OUTPATIENT SERVICES | Facility: HOSPITAL | Age: 69
LOS: 1 days | End: 2024-11-04
Payer: COMMERCIAL

## 2024-11-04 DIAGNOSIS — Z98.891 HISTORY OF UTERINE SCAR FROM PREVIOUS SURGERY: Chronic | ICD-10-CM

## 2024-11-04 DIAGNOSIS — Z98.51 TUBAL LIGATION STATUS: Chronic | ICD-10-CM

## 2024-11-04 DIAGNOSIS — D50.9 IRON DEFICIENCY ANEMIA, UNSPECIFIED: ICD-10-CM

## 2024-11-08 ENCOUNTER — NON-APPOINTMENT (OUTPATIENT)
Age: 69
End: 2024-11-08

## 2024-11-12 ENCOUNTER — APPOINTMENT (OUTPATIENT)
Dept: INFUSION THERAPY | Facility: CANCER CENTER | Age: 69
End: 2024-11-12

## 2024-11-12 ENCOUNTER — NON-APPOINTMENT (OUTPATIENT)
Age: 69
End: 2024-11-12

## 2024-11-13 ENCOUNTER — NON-APPOINTMENT (OUTPATIENT)
Age: 69
End: 2024-11-13

## 2024-11-13 ENCOUNTER — APPOINTMENT (OUTPATIENT)
Dept: MRI IMAGING | Facility: CLINIC | Age: 69
End: 2024-11-13

## 2024-11-13 VITALS — WEIGHT: 111.99 LBS

## 2024-11-13 PROCEDURE — 72148 MRI LUMBAR SPINE W/O DYE: CPT

## 2024-11-13 RX ORDER — CHLORHEXIDINE GLUCONATE 1.2 MG/ML
1 RINSE ORAL ONCE
Refills: 0 | Status: DISCONTINUED | OUTPATIENT
Start: 2024-11-14 | End: 2024-11-19

## 2024-11-13 NOTE — H&P PST ADULT - NSICDXPASTMEDICALHX_GEN_ALL_CORE_FT
PAST MEDICAL HISTORY:  Depression     Graves disease     HTN (hypertension) x 15 years, controlled    Hyperlipidemia

## 2024-11-13 NOTE — H&P PST ADULT - HISTORY OF PRESENT ILLNESS
Department of Cardiology                                                                  [facility]/[place] [address]                                                            Telephone: [phone]. Fax:[phone]                                                                                    [facility]     Chief complaint:    Patient is a 69y old  Female who presents with a chief complaint of  LLE pain with activity.     HPI:   70 yo M referred by Dr. Bisi Galicia for PAD and CV evaluation. For historical reference: she has CAD s/p PCI   2/2024 LAD, multilevel PAD R iliac intervention 2/2024, COPD, HTN, HLD, tobacco use. Presents to [facility] for peripheral intervention of the Left SFA.     9/2024 visit: left iliac intervention, leaving rest of infrainguinal complex disease. BOLA overall only slightly improved   on left side. still with ischemic digit pain. LDL 85    8/2024 VISIT: unfortunately had to postpone peripheral angiogram due to GI bleeding. same symptoms rest pain   left foot. tolerating dapt now.     7/2024 VISIT: rest pain of left foot. dampened flow. RCFA pulse 2+ and biphasic distal pulses. right leg ok. vaping.   cilostazol did not help.     5/2024 VISIT: R iliac intervention performed has 2+ bilateral CFA pulses now.. underwent PAD testing patent right   iliac stent BOLA right improvement from 0.51 to 0.68. Has resting left foot pain worse with upright, better with   dangling off the bed. No wounds or ulcers.    2/2024 VISIT: stopped tobacco. bp elevated due to car issue today getting towed. underwent PCI mid LAD lesion.   found to have multilevel PAD on angiogram. radial site healing well. no chest pain. still has symmetric claudication.    12/2023 VISIT: 1/2 ppd. some exertional chest pain but primarily limited by claudication. CCTA showed   hemodynamically significant LAD disease. CT A/P showed bilateral iliac and R>L fempop disease. she has   symmetric symptoms.     10/2023 VISIT: stable symptoms. still 1 ppd tobacco. [facility] service reached out.   interim testing with   BOLA right 0.51, left 0.45   mild carotid disease   Echo: preserved biventricular function, mild valve disease, mild LAE. ectopy.   8/2023 INITIAL VISIT HISTORICAL REFERENCE: she reports chest discomfort for past 2 years both exertional   and non exertional. no dyspnea when she walks. can go upstairs without difficulty. can walk 500 feet down street   limited by right knee pain. still 1 ppd tobacco.   for PAD: has symmetric calf burning but she pushes through it. the burning starts after 50 feet. does report lifestyle  would be better if this symptom did not exist. no wounds or ulcers.   PET CT recently without malignancy.   pertinent physical exam: thin, 1+ b/l cfa, warm, faint distal pulses (monophasic R DP/PT, biphasic L PT), scattered   wheezing       Cardiology Testing Summary   conslusion 10/2024   LLE: S/P EIA stent, PSV in stent 269.2cm/s with multiphasic waveforms. CFA and Prox SFA have   multiphasic waveforms. Prox/mid SFA     Steve demonstrate heavily calcified diffuse obstructive disease   with monophasic waveforms. 3 vessel runoff is seen BTK.    RLE: S/P RCIA stenting. Sharp upstroke in EIA and CFA c/w patent LAUREL stent. SFA     Steve   demonstrate heavily calcified diffuse obstructive disease with monophasic waveforms. 2 Vessel runoff   seen in the PTA and DYLAN with monophasic waveforms. PTA difficult to visualize.    Compared to the lower arterial ultrasound performed on 5/15/2024, s/p STAN stent with improved   waveforms in the EIA and CFA      Antianginal Therapies:        Beta Blockers:         Calcium Channel Blockers:        Long Acting Nitrates:        Ranexa:     Associated Risk Factors:        Cerebrovascular Disease: N/A       Chronic Lung Disease: N/A       Peripheral Arterial Disease: N/A       Chronic Kidney Disease (if yes, what is GFR): N/A       Uncontrolled Diabetes (if yes, what is HgbA1C or FBS): N/A       Poorly Controlled Hypertension (if yes, what is SBP): N/A       Morbid Obesity (if yes, what is BMI): N/A       History of Recent Ventricular Arrhythmia: N/A       Inability to Ambulate Safely: N/A       Need for Therapeutic Anticoagulation: N/A       Antiplatelet or Contrast Allergy: N/A    MEDICATIONS:                  ROS: as stated above, otherwise negative    PHYSICAL EXAM:  Constitutional: A & O x 3  HEENT:   Normal oral mucosa, PERRL, EOMI	  Cardiovascular: Normal S1 S2, No JVD, *****No murmurs  Respiratory: Lungs clear to auscultation	****  Gastrointestinal:  Soft, Non-tender, + BS	  Skin: No rashes, No ecchymoses, No cyanosis  Neurologic: Non-focal  Extremities: Normal range of motion, no edema****  Vascular: Peripheral pulses palpable + bilaterally ****    ECG:  	    LABS:	 	                                         Risk Factors for PAD       Age:        DM: N/A       Smoking History: N/A       Hyperlipidemia: N/A       Hypertension: N/A       Family History of PAD: N/A       Known Atherosclerotic Disease (coronary, carotid, subclavian, renal, mesenteric, AAA):     Subjective Complaints:        Claudication Katerin Class:        Impaired Walking Function: N/A       Ischemic Rest Pain: N/A       Other Non-Joint Related Exertional Lower Extremity Symptoms (not typical of claudication): N/A    Objective Findings:        Lower Extremity Pulses: Unable to palpate DP pulses, doppler pulses noted.       Nonhealing Lower Extremity Wound: N/A       Lower Extremity Gangrene: N/A       Vascular Bruit: N/A       Other Suggestive Lower Extremity Findings (elevation pallor, dependent rubor): N/A    Peripheral Angiography: 08/27/2024  Infrarenal aorta: ectatic   Right lower extremity:   ·	Patent RCIA stent (7 X 57 X 135 EXPRESS Bare Metal Stent).   ·	Moderate EIA disease.   ·	Ostial RSFA blunt stump occlusion.   ·	Patent proximal profunda.   ·	The rest not injected known from prior   Left lower extremity:   ·	LAUREL patent.   ·	EIA 90% diffuse disease. A successful Balloon angioplasty was performed using a CATH IVL SHOCKWAVE M5 PLUS 5.5X60MM balloon. A successful Self-Expanding Stent was deployed using a 6 X 40 X 130 PAUL stent. A successful Balloon angioplasty was performed using a 6.0 X 40 X 75 MUSTANG balloon.  ·	CFA moderate.   ·	SFA diffuse disease until mid segment  reconstitution at P3. below knee appears to reconstitute into 3 vessel runoff.   ·	Patent profunda. IIA diffuse disease.    [facility]/Peripheral Angiography: 02/16/2024   Coronary Angiography   The coronary circulation is right dominant. Cardiac catheterization was performed electively.  LM   ·	Left main artery: dual ostia of LAD/LCx.    LAD   ·	Mid left anterior descending: There is a 70 % stenosis. A successful Drug Eluting Stent was deployed using a 2.75 X 26 MM BAMBI FRONTIER RX LOYDA.   CX   ·	Circumflex: Angiography shows moderate atherosclerosis.    RCA   ·	Right coronary artery: Angiography shows moderate atherosclerosis. Diffuse 30%.  Peripheral Angiography:  RIGHT LOWER EXTREMITY:    ·	LAUREL: 80% heavily calcified   ·	EIA: 70% right at IIA takeoff   ·	IIA: appears occluded   ·	CFA: patent   ·	SFA: ostial occlusion and heavily calcified throughout with reconstitution at adductor canal  ·	PFA: ostial 70%   ·	Popliteal: moderate athero   ·	Below knee: appears 2 vessel runoff with AT and peroneal   LEFT LOWER EXTREMITY:    ·	LAUREL: patent   ·	EIA: diffuse 60-70%   ·	IIA: ostial 50%   ·	CFA: patent    ·	SFA: ostial 30%, diffuse prox to mid 50% followed by heavily calcified, 90% mid and distal SFA serial lesions.  ·	PFA: patent    Vascular Testing:        BOLA: 9/18/2024  Right Lower Extremity Arteries:  ·	Moderately abnormal BOLA 0.64. Mildly abnormal ankle waveforms. Decrease in BOLA post exercise. TBI 0.35.  Left Lower Extremity Arteries:  ·	Abnormal BOLA 0.40. Mildly abnormal ankle waveforms. No significant changes post exercise. very faint digit waveform TBI not obtainable.    BILATERAL LOWER EXTREMITY ARTERIAL ULTRASOUND: 10/21/2024  Right Lower Extremity Arteries:  ·	EIA: The right external iliac artery demonstrates moderate, heterogeneous atherosclerotic plaque and multiphasic flow.  ·	CFA: The right common femoral artery demonstrates moderate, heterogeneous atherosclerotic plaque and multiphasic flow.  ·	DFA: The right deep femoral artery demonstrates moderate, heterogeneous atherosclerotic plaque and multiphasic flow.  ·	SFA: The proximal right superficial femoral artery demonstrates severe, calcified atherosclerotic plaque and monophasic flow. The mid right superficial femoral artery demonstrates severe, calcified atherosclerotic plaque and monophasic flow. The distal right superficial femoral artery demonstrates severe, calcified atherosclerotic plaque and monophasic flow.  ·	POP: The distal right popliteal artery demonstrates severe, calcified atherosclerotic plaque and monophasic flow.  ·	DYLAN: The right anterior tibial artery demonstrates moderate, calcified atherosclerotic plaque and monophasic flow.  ·	TPT: The right tibial-peroneal trunk demonstrates mild-moderate, heterogeneous atherosclerotic plaque and monophasic flow.  ·	PTA: The right posterior tibial artery demonstrates moderate, heterogeneous atherosclerotic plaque and monophasic flow.  Left Lower Extremity Arteries:  ·	CFA: The left common femoral artery demonstrates moderate, heterogeneous atherosclerotic plaque and multiphasic flow.  ·	DFA: The left deep femoral artery demonstrates mild-moderate, smooth and heterogeneous atherosclerotic plaque and multiphasic flow.  ·	SFA: The proximal left superficial femoral artery demonstrates moderate, calcified atherosclerotic plaque and multiphasic flow. The mid left superficial femoral artery demonstrates severe, calcified atherosclerotic plaque and monophasic flow. The distal left superficial femoral artery demonstrates severe, calcified atherosclerotic plaque and monophasic flow.  ·	POP: The distal left popliteal artery demonstrates severe, calcified atherosclerotic plaque and monophasic flow.  ·	DYLAN: The left anterior tibial artery demonstrates moderate, calcified atherosclerotic plaque and monophasic flow.  ·	TPT: The left tibial-peroneal trunk demonstrates moderate, heterogeneous atherosclerotic plaque and monophasic flow.  ·	PTA: The left posterior tibial artery demonstrates moderate, calcified atherosclerotic plaque and monophasic flow.  ·	VANITA: The left peroneal artery demonstrates moderate, calcified atherosclerotic plaque and monophasic flow.         CTA/MRA:     Medical Management:       Antiplatelets:        Cilostazol:        Statin:     ROS:   General: No fevers/chills, no fatigue  HEENT: No visual disturbances, no hearing loss, no headaches, no epistaxis.  CV: No chest pain, no KELLY, no palpitations, no edema, no orthopnea, no PND.  Respiratory: No dyspnea, no wheeze, no cough.  GI: no nausea/vomiting, no black/bloody stools.  : No hematuria  Musculoskeletal: No myalgias, no arthralgias, karuna back pain.  Neurologic: No weakness, no hemiparesis, no paresthesias, no seizures, no syncope/near syncope.    T(C): --  HR: --  BP: --  RR: --  SpO2: --    Physical Examination:   General: Awake, alert, speech clear, no acute distress.  HEENT: PERRL, EOMI.  Neck: No elevated JVP, no bruit, trachea midline.  Chest: CTA B/L, S1, S2, no murmur, RRR.  Abdomen: Soft, nontender, nondistended, normal bowel sounds.  Extremities: No edema, 2+ pulses X 4 extremities.  Neurologic: A&OX3, CN 2-12 grossly intact.    Katerin Claudication Classification:        I: Asymptomatic       IIa: Walking > 200 meters (2.5 blocks)       IIb: Walking < 200 meters (2.5 blocks)       III: Rest/nocturnal pain       IV: Necrosis/gangrene   68y/o female smoker with history of coronary artery disease, PCI 2/2024 LAD, multilevel PAD, R iliac intervention 2/2024, COPD, HTN, HLD, carotid and aortic disease, prediabetes.    Risk Factors for PAD       Age: 69       DM: N/A       Smoking History: Yes       Hyperlipidemia: N/A       Hypertension: N/A       Family History of PAD: N/A       Known Atherosclerotic Disease: Known CAD and carotid artery disease    Subjective Complaints:        Claudication Katerin Class:        Impaired Walking Function: N/A       Ischemic Rest Pain: N/A       Other Non-Joint Related Exertional Lower Extremity Symptoms (not typical of claudication): N/A    Objective Findings:        Lower Extremity Pulses: Unable to palpate DP pulses, doppler pulses noted.       Nonhealing Lower Extremity Wound: N/A       Lower Extremity Gangrene: N/A       Vascular Bruit: N/A       Other Suggestive Lower Extremity Findings (elevation pallor, dependent rubor): N/A    Peripheral Angiography: 08/27/2024  Infrarenal aorta: ectatic   Right lower extremity:   ·	Patent RCIA stent (7 X 57 X 135 EXPRESS Bare Metal Stent).   ·	Moderate EIA disease.   ·	Ostial RSFA blunt stump occlusion.   ·	Patent proximal profunda.   ·	The rest not injected known from prior   Left lower extremity:   ·	LAUREL patent.   ·	EIA 90% diffuse disease. A successful Balloon angioplasty was performed using a CATH IVL SHOCKWAVE M5 PLUS 5.5X60MM balloon. A successful Self-Expanding Stent was deployed using a 6 X 40 X 130 PAUL stent. A successful Balloon angioplasty was performed using a 6.0 X 40 X 75 MUSTANG balloon.  ·	CFA moderate.   ·	SFA diffuse disease until mid segment  reconstitution at P3. below knee appears to reconstitute into 3 vessel runoff.   ·	Patent profunda. IIA diffuse disease.    [facility]/Peripheral Angiography: 02/16/2024   Coronary Angiography   The coronary circulation is right dominant. Cardiac catheterization was performed electively.  LM   ·	Left main artery: dual ostia of LAD/LCx.    LAD   ·	Mid left anterior descending: There is a 70 % stenosis. A successful Drug Eluting Stent was deployed using a 2.75 X 26 MM BAMBI FRONTIER RX LOYDA.   CX   ·	Circumflex: Angiography shows moderate atherosclerosis.    RCA   ·	Right coronary artery: Angiography shows moderate atherosclerosis. Diffuse 30%.  Peripheral Angiography:  RIGHT LOWER EXTREMITY:    ·	LAUREL: 80% heavily calcified   ·	EIA: 70% right at IIA takeoff   ·	IIA: appears occluded   ·	CFA: patent   ·	SFA: ostial occlusion and heavily calcified throughout with reconstitution at adductor canal  ·	PFA: ostial 70%   ·	Popliteal: moderate athero   ·	Below knee: appears 2 vessel runoff with AT and peroneal   LEFT LOWER EXTREMITY:    ·	LAUREL: patent   ·	EIA: diffuse 60-70%   ·	IIA: ostial 50%   ·	CFA: patent    ·	SFA: ostial 30%, diffuse prox to mid 50% followed by heavily calcified, 90% mid and distal SFA serial lesions.  ·	PFA: patent    Vascular Testing:        BOLA: 9/18/2024  Right Lower Extremity Arteries:  ·	Moderately abnormal BOLA 0.64. Mildly abnormal ankle waveforms. Decrease in BOLA post exercise. TBI 0.35.  Left Lower Extremity Arteries:  ·	Abnormal BOLA 0.40. Mildly abnormal ankle waveforms. No significant changes post exercise. very faint digit waveform TBI not obtainable.    BILATERAL LOWER EXTREMITY ARTERIAL ULTRASOUND: 10/21/2024  Right Lower Extremity Arteries:  ·	EIA: The right external iliac artery demonstrates moderate, heterogeneous atherosclerotic plaque and multiphasic flow.  ·	CFA: The right common femoral artery demonstrates moderate, heterogeneous atherosclerotic plaque and multiphasic flow.  ·	DFA: The right deep femoral artery demonstrates moderate, heterogeneous atherosclerotic plaque and multiphasic flow.  ·	SFA: The proximal right superficial femoral artery demonstrates severe, calcified atherosclerotic plaque and monophasic flow. The mid right superficial femoral artery demonstrates severe, calcified atherosclerotic plaque and monophasic flow. The distal right superficial femoral artery demonstrates severe, calcified atherosclerotic plaque and monophasic flow.  ·	POP: The distal right popliteal artery demonstrates severe, calcified atherosclerotic plaque and monophasic flow.  ·	DYLAN: The right anterior tibial artery demonstrates moderate, calcified atherosclerotic plaque and monophasic flow.  ·	TPT: The right tibial-peroneal trunk demonstrates mild-moderate, heterogeneous atherosclerotic plaque and monophasic flow.  ·	PTA: The right posterior tibial artery demonstrates moderate, heterogeneous atherosclerotic plaque and monophasic flow.  Left Lower Extremity Arteries:  ·	CFA: The left common femoral artery demonstrates moderate, heterogeneous atherosclerotic plaque and multiphasic flow.  ·	DFA: The left deep femoral artery demonstrates mild-moderate, smooth and heterogeneous atherosclerotic plaque and multiphasic flow.  ·	SFA: The proximal left superficial femoral artery demonstrates moderate, calcified atherosclerotic plaque and multiphasic flow. The mid left superficial femoral artery demonstrates severe, calcified atherosclerotic plaque and monophasic flow. The distal left superficial femoral artery demonstrates severe, calcified atherosclerotic plaque and monophasic flow.  ·	POP: The distal left popliteal artery demonstrates severe, calcified atherosclerotic plaque and monophasic flow.  ·	DYLAN: The left anterior tibial artery demonstrates moderate, calcified atherosclerotic plaque and monophasic flow.  ·	TPT: The left tibial-peroneal trunk demonstrates moderate, heterogeneous atherosclerotic plaque and monophasic flow.  ·	PTA: The left posterior tibial artery demonstrates moderate, calcified atherosclerotic plaque and monophasic flow.  ·	VANITA: The left peroneal artery demonstrates moderate, calcified atherosclerotic plaque and monophasic flow.         CTA/MRA: N/A    TRANSTHORACIC ECHOCARDIOGRAM: 10/6/2023  ·	Left Ventricle: The left ventricular cavity is normally sized. Left ventricular wall thickness is normal. Left ventricular systolic function is normal with an ejection fraction visually estimated at 60 to 65%. There is normal left ventricular diastolic function. Frequent ectopy noted.  ·	Right Ventricle: The right ventricular cavity is normal in size and normal systolic function.  ·	Left Atrium: The left atrium is mildly dilated in size with an indexed volume of 38.14 ml/m².  ·	Right Atrium: The right atrium is normal in size.  ·	Interatrial Septum: The interatrial septum appears intact.  ·	Aortic Valve: The aortic valve appears trileaflet with normal systolic excursion. There is mild calcification of the aortic valve leaflets. There is no aortic valve stenosis. There is no evidence of aortic regurgitation.  ·	Mitral Valve: Structurally normal mitral valve with normal leaflet excursion. There is mitral valve thickening of the anterior and posterior leaflets. There is mild calcification of the mitral valve annulus. There is mild mitral regurgitation.  ·	Tricuspid Valve: Structurally normal tricuspid valve with normal leaflet excursion. There is no evidence of tricuspid stenosis. There is mild tricuspid regurgitation ,due to right heart device lead. Estimated pulmonary artery systolic pressure is 32 mmHg.  ·	Pulmonic Valve: Structurally normal pulmonic valve with normal leaflet excursion. There is no pulmonic valve stenosis. There is trace pulmonic regurgitation.  ·	Pericardium: No pericardial effusion seen.  ·	Systemic Veins: The inferior vena cava is normal in size measuring 1.76 cm in diameter, (normal <2.1cm) with normal inspiratory collapse (normal >50%) consistent with normal right atrial pressure (\R\3, range 0-5mmHg).    Medical Management:       Antiplatelets: Plavix 75 mg daily, aspirin 81 mg daily       Cilostazol: N/A       Statin: Crestor 40 mg daily    ROS:   General: No fevers/chills, no fatigue  HEENT: No visual disturbances, no hearing loss, no headaches, no epistaxis.  CV: No chest pain, no KELLY, no palpitations, no edema, no orthopnea, no PND.  Respiratory: No dyspnea, no wheeze, no cough.  GI: no nausea/vomiting, no black/bloody stools.  : No hematuria  Musculoskeletal: No myalgias, no arthralgias, karuna back pain.  Neurologic: No weakness, no hemiparesis, no paresthesias, no seizures, no syncope/near syncope.    T(C): --  HR: --  BP: --  RR: --  SpO2: --    Physical Examination:   General: Awake, alert, speech clear, no acute distress.  HEENT: PERRL, EOMI.  Neck: No elevated JVP, no bruit, trachea midline.  Chest: CTA B/L, S1, S2, no murmur, RRR.  Abdomen: Soft, nontender, nondistended, normal bowel sounds.  Extremities: No edema, 2+ pulses X 4 extremities.  Neurologic: A&OX3, CN 2-12 grossly intact.    Katerin Claudication Classification:        I: Asymptomatic       IIa: Walking > 200 meters (2.5 blocks)       IIb: Walking < 200 meters (2.5 blocks)       III: Rest/nocturnal pain       IV: Necrosis/gangrene   70y/o female smoker with history of coronary artery disease, PCI 2/2024 LAD, multilevel PAD, R iliac intervention 2/2024, COPD, HTN, HLD, carotid and aortic disease, prediabetes. She states that she get left lower leg numbness and pain at rest, and right lower leg cramping when walking a few blocks, which is improved since PTA of the right LAUREL stent. She denies chest pain or SOB, although she states she does not exert herself too much do to her leg pain.    Risk Factors for PAD       Age: 69       DM: N/A       Smoking History: Yes       Hyperlipidemia: N/A       Hypertension: N/A       Family History of PAD: N/A       Known Atherosclerotic Disease: Known CAD and carotid artery disease    Subjective Complaints:        Claudication Katerin Class: 3       Impaired Walking Function: N/A       Ischemic Rest Pain: Yes       Other Non-Joint Related Exertional Lower Extremity Symptoms (not typical of claudication): N/A    Objective Findings:        Lower Extremity Pulses: Unable to palpate DP pulses, doppler pulses noted.       Nonhealing Lower Extremity Wound: N/A       Lower Extremity Gangrene: N/A       Vascular Bruit: N/A       Other Suggestive Lower Extremity Findings (elevation pallor, dependent rubor): N/A    Peripheral Angiography: 08/27/2024  Infrarenal aorta: ectatic   Right lower extremity:   ·	Patent RCIA stent (7 X 57 X 135 EXPRESS Bare Metal Stent).   ·	Moderate EIA disease.   ·	Ostial RSFA blunt stump occlusion.   ·	Patent proximal profunda.   ·	The rest not injected known from prior   Left lower extremity:   ·	LAUREL patent.   ·	EIA 90% diffuse disease. A successful Balloon angioplasty was performed using a CATH IVL SHOCKWAVE M5 PLUS 5.5X60MM balloon. A successful Self-Expanding Stent was deployed using a 6 X 40 X 130 PAUL stent. A successful Balloon angioplasty was performed using a 6.0 X 40 X 75 MUSTANG balloon.  ·	CFA moderate.   ·	SFA diffuse disease until mid segment  reconstitution at P3. below knee appears to reconstitute into 3 vessel runoff.   ·	Patent profunda. IIA diffuse disease.    [facility]/Peripheral Angiography: 02/16/2024   Coronary Angiography   The coronary circulation is right dominant. Cardiac catheterization was performed electively.  LM   ·	Left main artery: dual ostia of LAD/LCx.    LAD   ·	Mid left anterior descending: There is a 70 % stenosis. A successful Drug Eluting Stent was deployed using a 2.75 X 26 MM BAMBI FRONTIER RX LOYDA.   CX   ·	Circumflex: Angiography shows moderate atherosclerosis.    RCA   ·	Right coronary artery: Angiography shows moderate atherosclerosis. Diffuse 30%.  Peripheral Angiography:  RIGHT LOWER EXTREMITY:    ·	LAUREL: 80% heavily calcified   ·	EIA: 70% right at IIA takeoff   ·	IIA: appears occluded   ·	CFA: patent   ·	SFA: ostial occlusion and heavily calcified throughout with reconstitution at adductor canal  ·	PFA: ostial 70%   ·	Popliteal: moderate athero   ·	Below knee: appears 2 vessel runoff with AT and peroneal   LEFT LOWER EXTREMITY:    ·	LAUREL: patent   ·	EIA: diffuse 60-70%   ·	IIA: ostial 50%   ·	CFA: patent    ·	SFA: ostial 30%, diffuse prox to mid 50% followed by heavily calcified, 90% mid and distal SFA serial lesions.  ·	PFA: patent    Vascular Testing:        BOLA: 9/18/2024  Right Lower Extremity Arteries:  ·	Moderately abnormal BOLA 0.64. Mildly abnormal ankle waveforms. Decrease in BOLA post exercise. TBI 0.35.  Left Lower Extremity Arteries:  ·	Abnormal BOLA 0.40. Mildly abnormal ankle waveforms. No significant changes post exercise. very faint digit waveform TBI not obtainable.    BILATERAL LOWER EXTREMITY ARTERIAL ULTRASOUND: 10/21/2024  Right Lower Extremity Arteries:  ·	EIA: The right external iliac artery demonstrates moderate, heterogeneous atherosclerotic plaque and multiphasic flow.  ·	CFA: The right common femoral artery demonstrates moderate, heterogeneous atherosclerotic plaque and multiphasic flow.  ·	DFA: The right deep femoral artery demonstrates moderate, heterogeneous atherosclerotic plaque and multiphasic flow.  ·	SFA: The proximal right superficial femoral artery demonstrates severe, calcified atherosclerotic plaque and monophasic flow. The mid right superficial femoral artery demonstrates severe, calcified atherosclerotic plaque and monophasic flow. The distal right superficial femoral artery demonstrates severe, calcified atherosclerotic plaque and monophasic flow.  ·	POP: The distal right popliteal artery demonstrates severe, calcified atherosclerotic plaque and monophasic flow.  ·	DYLAN: The right anterior tibial artery demonstrates moderate, calcified atherosclerotic plaque and monophasic flow.  ·	TPT: The right tibial-peroneal trunk demonstrates mild-moderate, heterogeneous atherosclerotic plaque and monophasic flow.  ·	PTA: The right posterior tibial artery demonstrates moderate, heterogeneous atherosclerotic plaque and monophasic flow.  Left Lower Extremity Arteries:  ·	CFA: The left common femoral artery demonstrates moderate, heterogeneous atherosclerotic plaque and multiphasic flow.  ·	DFA: The left deep femoral artery demonstrates mild-moderate, smooth and heterogeneous atherosclerotic plaque and multiphasic flow.  ·	SFA: The proximal left superficial femoral artery demonstrates moderate, calcified atherosclerotic plaque and multiphasic flow. The mid left superficial femoral artery demonstrates severe, calcified atherosclerotic plaque and monophasic flow. The distal left superficial femoral artery demonstrates severe, calcified atherosclerotic plaque and monophasic flow.  ·	POP: The distal left popliteal artery demonstrates severe, calcified atherosclerotic plaque and monophasic flow.  ·	DYLAN: The left anterior tibial artery demonstrates moderate, calcified atherosclerotic plaque and monophasic flow.  ·	TPT: The left tibial-peroneal trunk demonstrates moderate, heterogeneous atherosclerotic plaque and monophasic flow.  ·	PTA: The left posterior tibial artery demonstrates moderate, calcified atherosclerotic plaque and monophasic flow.  ·	VANITA: The left peroneal artery demonstrates moderate, calcified atherosclerotic plaque and monophasic flow.         CTA/MRA: N/A    TRANSTHORACIC ECHOCARDIOGRAM: 10/6/2023  ·	Left Ventricle: The left ventricular cavity is normally sized. Left ventricular wall thickness is normal. Left ventricular systolic function is normal with an ejection fraction visually estimated at 60 to 65%. There is normal left ventricular diastolic function. Frequent ectopy noted.  ·	Right Ventricle: The right ventricular cavity is normal in size and normal systolic function.  ·	Left Atrium: The left atrium is mildly dilated in size with an indexed volume of 38.14 ml/m².  ·	Right Atrium: The right atrium is normal in size.  ·	Interatrial Septum: The interatrial septum appears intact.  ·	Aortic Valve: The aortic valve appears trileaflet with normal systolic excursion. There is mild calcification of the aortic valve leaflets. There is no aortic valve stenosis. There is no evidence of aortic regurgitation.  ·	Mitral Valve: Structurally normal mitral valve with normal leaflet excursion. There is mitral valve thickening of the anterior and posterior leaflets. There is mild calcification of the mitral valve annulus. There is mild mitral regurgitation.  ·	Tricuspid Valve: Structurally normal tricuspid valve with normal leaflet excursion. There is no evidence of tricuspid stenosis. There is mild tricuspid regurgitation ,due to right heart device lead. Estimated pulmonary artery systolic pressure is 32 mmHg.  ·	Pulmonic Valve: Structurally normal pulmonic valve with normal leaflet excursion. There is no pulmonic valve stenosis. There is trace pulmonic regurgitation.  ·	Pericardium: No pericardial effusion seen.  ·	Systemic Veins: The inferior vena cava is normal in size measuring 1.76 cm in diameter, (normal <2.1cm) with normal inspiratory collapse (normal >50%) consistent with normal right atrial pressure (\R\3, range 0-5mmHg).    Medical Management:       Antiplatelets: Plavix 75 mg daily, aspirin 81 mg daily       Cilostazol: N/A       Statin: Crestor 40 mg daily    Social History:   ·	Marital: , lives with   ·	Tobacco: Current 1 ppd smoker for 30 years.  ·	ETOH: Denies  ·	Drugs: Denies  ·	Caffeine: 2 cups coffee daily  ·	Occupation: Retired [employer]    ROS:   General: No fevers/chills, no fatigue  HEENT: No visual disturbances, no hearing loss, no headaches, no epistaxis.  CV: No chest pain, no KELLY, no palpitations, no edema, no orthopnea, no PND. + claudication  Respiratory: No dyspnea, no wheeze, no cough.  GI: no nausea/vomiting, no black/bloody stools.  : No hematuria  Musculoskeletal: No myalgias, no arthralgias, no back pain.  Neurologic: No weakness, no hemiparesis, no paresthesias, no seizures, no syncope/near syncope.    T(C): 36.5 (11-14-24 @ 08:16), Max: 36.5 (11-14-24 @ 08:16)  HR: 69 (11-14-24 @ 08:16)  BP: 142/75 (11-14-24 @ 08:16)  RR: 17 (11-14-24 @ 08:16)  SpO2: 99% (11-14-24 @ 08:16)    Physical Examination:   General: Awake, alert, speech clear, no acute distress.  HEENT: PERRL, EOMI.  Neck: No elevated JVP, no bruit, trachea midline.  Chest: CTA B/L, S1, S2, no murmur, RRR.  Abdomen: Soft, nontender, nondistended, normal bowel sounds.  Extremities: Radial: Right: 2+, Left: 2+, DP: Right: + by doppler, Left: UTO, PT: Right: + by doppler, Left: + by doppler  Neurologic: A&OX3, CN 2-12 grossly intact.

## 2024-11-13 NOTE — H&P PST ADULT - PROBLEM SELECTOR PLAN 3
Goal Non-HDL < 100, LDL < 70  Lipid Panel: In AM  Statin: Will continue Crestor 40 mg daily  Other: N/A

## 2024-11-13 NOTE — H&P PST ADULT - PROBLEM SELECTOR PLAN 2
Nicotine Replacement:        Patch: 14 mg daily       PRN Medication: Nicotine lozenge 4 mg every 3 hours as needed  Smoking Cessation

## 2024-11-13 NOTE — H&P PST ADULT - ASSESSMENT
HPI:   68 yo M referred by Dr. Bisi Galicia for PAD and CV evaluation. For historical reference: she has CAD s/p PCI   2/2024 LAD, multilevel PAD R iliac intervention 2/2024, COPD, HTN, HLD, tobacco use. Presents to [facility] for peripheral intervention of the Left SFA.         MALLAMPATI:  ASA:  BRA:  GFR: 68y/o female smoker with history of coronary artery disease, PCI 2/2024 LAD, multilevel PAD, R iliac intervention 2/2024, COPD, HTN, HLD, carotid and aortic disease, prediabetes. She states that she get left lower leg numbness and pain at rest, and right lower leg cramping when walking a few blocks, which is improved since PTA of the right LAUREL stent. She denies chest pain or SOB, although she states she does not exert herself too much do to her leg pain.

## 2024-11-13 NOTE — H&P PST ADULT - PROBLEM SELECTOR PLAN 1
Plan/Recommendations:   -plan for Peripheral angio   -patient seen and examined  -ECG and Labs reviewed  -NPO after midnight prior with exception of sip of water with morning medications  --Intermediate risk for RENA, explained to pt including the possibility of worsening RFT post cath and if no recovery of RF, may need RRT/dialysis. Pt verbalized understanding. Proceed w/informed consent. Optimized renal stand point.  - pt. assessed, appropriate for sedation, pt.  educated regarding the plan for Versed/fentanyl as needed. LLE angiography and possible intervention       ASA: 3       Mallampati: 2       GFR: 96       Creatinine: 0.6       Bleeding Risk: 3.5%  Aspirin 81 mg once if not already taken.   mL IV over 1 hour.  Procedure explained and questions answered. Informed consent obtained.  Pt. assessed, appropriate for sedation, pt.  educated regarding the plan for Versed/fentanyl as needed.  Antiplatelet Therapy: Will continue Plavix 75 mg daily and aspirin 81 mg daily  Statin: Will continue Crestor 40 mg daily  Other: N/A

## 2024-11-13 NOTE — H&P PST ADULT - OTHER CARE PROVIDERS
Wai Agrawal ([facility], [address], [place] [address], [phone], Fax: [phone])

## 2024-11-14 ENCOUNTER — INPATIENT (INPATIENT)
Facility: HOSPITAL | Age: 69
LOS: 4 days | Discharge: HOME | DRG: 301 | End: 2024-11-19
Attending: HOSPITALIST | Admitting: INTERNAL MEDICINE
Payer: MEDICARE

## 2024-11-14 ENCOUNTER — NON-APPOINTMENT (OUTPATIENT)
Age: 69
End: 2024-11-14

## 2024-11-14 DIAGNOSIS — Z98.51 TUBAL LIGATION STATUS: Chronic | ICD-10-CM

## 2024-11-14 DIAGNOSIS — I73.9 PERIPHERAL VASCULAR DISEASE, UNSPECIFIED: ICD-10-CM

## 2024-11-14 DIAGNOSIS — D50.9 IRON DEFICIENCY ANEMIA, UNSPECIFIED: ICD-10-CM

## 2024-11-14 DIAGNOSIS — F17.200 NICOTINE DEPENDENCE, UNSPECIFIED, UNCOMPLICATED: ICD-10-CM

## 2024-11-14 DIAGNOSIS — E78.5 HYPERLIPIDEMIA, UNSPECIFIED: ICD-10-CM

## 2024-11-14 DIAGNOSIS — Z98.891 HISTORY OF UTERINE SCAR FROM PREVIOUS SURGERY: Chronic | ICD-10-CM

## 2024-11-14 LAB
ANTIBODY INTERPRETATION 2: SIGNIFICANT CHANGE UP
BLD GP AB SCN SERPL QL: SIGNIFICANT CHANGE UP
DIR ANTIGLOB POLYSPECIFIC INTERPRETATION: SIGNIFICANT CHANGE UP

## 2024-11-14 PROCEDURE — 37253 INTRVASC US NONCORONARY ADDL: CPT | Mod: XS

## 2024-11-14 PROCEDURE — 37224: CPT | Mod: 50,XS

## 2024-11-14 PROCEDURE — 37184 PRIM ART M-THRMBC 1ST VSL: CPT | Mod: 50

## 2024-11-14 PROCEDURE — 37226: CPT | Mod: RT

## 2024-11-14 PROCEDURE — 75716 ARTERY X-RAYS ARMS/LEGS: CPT | Mod: 26,XU

## 2024-11-14 PROCEDURE — 86077 PHYS BLOOD BANK SERV XMATCH: CPT

## 2024-11-14 PROCEDURE — 37252 INTRVASC US NONCORONARY 1ST: CPT

## 2024-11-14 PROCEDURE — 99152 MOD SED SAME PHYS/QHP 5/>YRS: CPT

## 2024-11-14 PROCEDURE — 37221: CPT | Mod: 50

## 2024-11-14 PROCEDURE — 93010 ELECTROCARDIOGRAM REPORT: CPT

## 2024-11-14 PROCEDURE — 75710 ARTERY X-RAYS ARM/LEG: CPT | Mod: 26,XS

## 2024-11-14 RX ORDER — ROSUVASTATIN CALCIUM 5 MG/1
40 TABLET, FILM COATED ORAL AT BEDTIME
Refills: 0 | Status: DISCONTINUED | OUTPATIENT
Start: 2024-11-14 | End: 2024-11-19

## 2024-11-14 RX ORDER — CLOPIDOGREL 75 MG/1
75 TABLET, FILM COATED ORAL DAILY
Refills: 0 | Status: DISCONTINUED | OUTPATIENT
Start: 2024-11-15 | End: 2024-11-19

## 2024-11-14 RX ORDER — SODIUM CHLORIDE 9 MG/ML
250 INJECTION, SOLUTION INTRAMUSCULAR; INTRAVENOUS; SUBCUTANEOUS ONCE
Refills: 0 | Status: COMPLETED | OUTPATIENT
Start: 2024-11-14 | End: 2024-11-14

## 2024-11-14 RX ORDER — LOSARTAN POTASSIUM 100 MG/1
50 TABLET, FILM COATED ORAL DAILY
Refills: 0 | Status: DISCONTINUED | OUTPATIENT
Start: 2024-11-14 | End: 2024-11-15

## 2024-11-14 RX ORDER — FENTANYL 12 UG/H
25 PATCH, EXTENDED RELEASE TRANSDERMAL ONCE
Refills: 0 | Status: DISCONTINUED | OUTPATIENT
Start: 2024-11-14 | End: 2024-11-14

## 2024-11-14 RX ORDER — NICOTINE 21 MG/24H
4 PATCH, EXTENDED RELEASE TRANSDERMAL
Refills: 0 | Status: DISCONTINUED | OUTPATIENT
Start: 2024-11-14 | End: 2024-11-19

## 2024-11-14 RX ORDER — LEVOTHYROXINE SODIUM 150 MCG
112 TABLET ORAL DAILY
Refills: 0 | Status: DISCONTINUED | OUTPATIENT
Start: 2024-11-14 | End: 2024-11-19

## 2024-11-14 RX ORDER — CEFAZOLIN SODIUM 10 G
2000 VIAL (EA) INJECTION EVERY 8 HOURS
Refills: 0 | Status: COMPLETED | OUTPATIENT
Start: 2024-11-14 | End: 2024-11-15

## 2024-11-14 RX ORDER — AMLODIPINE BESYLATE 10 MG/1
10 TABLET ORAL DAILY
Refills: 0 | Status: DISCONTINUED | OUTPATIENT
Start: 2024-11-14 | End: 2024-11-15

## 2024-11-14 RX ORDER — NICOTINE 21 MG/24H
1 PATCH, EXTENDED RELEASE TRANSDERMAL DAILY
Refills: 0 | Status: DISCONTINUED | OUTPATIENT
Start: 2024-11-14 | End: 2024-11-19

## 2024-11-14 RX ORDER — ONDANSETRON HYDROCHLORIDE 4 MG/1
4 TABLET, FILM COATED ORAL ONCE
Refills: 0 | Status: COMPLETED | OUTPATIENT
Start: 2024-11-14 | End: 2024-11-14

## 2024-11-14 RX ORDER — HEPARIN SODIUM,PORCINE 1000/ML
2000 VIAL (ML) INJECTION ONCE
Refills: 0 | Status: DISCONTINUED | OUTPATIENT
Start: 2024-11-14 | End: 2024-11-15

## 2024-11-14 RX ORDER — SODIUM CHLORIDE 9 MG/ML
1000 INJECTION, SOLUTION INTRAMUSCULAR; INTRAVENOUS; SUBCUTANEOUS
Refills: 0 | Status: DISCONTINUED | OUTPATIENT
Start: 2024-11-14 | End: 2024-11-17

## 2024-11-14 RX ORDER — HEPARIN SODIUM,PORCINE 1000/ML
1000 VIAL (ML) INJECTION
Qty: 25000 | Refills: 0 | Status: DISCONTINUED | OUTPATIENT
Start: 2024-11-14 | End: 2024-11-15

## 2024-11-14 RX ORDER — SERTRALINE HYDROCHLORIDE 100 MG/1
150 TABLET, FILM COATED ORAL DAILY
Refills: 0 | Status: DISCONTINUED | OUTPATIENT
Start: 2024-11-14 | End: 2024-11-19

## 2024-11-14 RX ORDER — CEFAZOLIN SODIUM 10 G
2000 VIAL (EA) INJECTION EVERY 8 HOURS
Refills: 0 | Status: DISCONTINUED | OUTPATIENT
Start: 2024-11-14 | End: 2024-11-14

## 2024-11-14 RX ADMIN — SODIUM CHLORIDE 250 MILLILITER(S): 9 INJECTION, SOLUTION INTRAMUSCULAR; INTRAVENOUS; SUBCUTANEOUS at 07:30

## 2024-11-14 RX ADMIN — FENTANYL 25 MICROGRAM(S): 12 PATCH, EXTENDED RELEASE TRANSDERMAL at 22:05

## 2024-11-14 RX ADMIN — FENTANYL 25 MICROGRAM(S): 12 PATCH, EXTENDED RELEASE TRANSDERMAL at 17:15

## 2024-11-14 RX ADMIN — FENTANYL 25 MICROGRAM(S): 12 PATCH, EXTENDED RELEASE TRANSDERMAL at 21:54

## 2024-11-14 RX ADMIN — ONDANSETRON HYDROCHLORIDE 4 MILLIGRAM(S): 4 TABLET, FILM COATED ORAL at 21:54

## 2024-11-14 RX ADMIN — Medication 2000 MILLIGRAM(S): at 22:23

## 2024-11-14 NOTE — ASU PATIENT PROFILE, ADULT - CENTRAL VENOUS CATHETER
Visit Information Date & Time Provider Department Dept. Phone Encounter #  
 3/15/2017  3:45 PM Isaiah Garcia MD [facility] [phone] [id number] Follow-up Instructions Return in about 1 month (around 4/15/2017). Your Appointments 5/31/2017  3:30 PM  
ROUTINE CARE with Isaiah Garcia MD  
[facility]) Appt Note: 3 month f/u Vit D deficiency [facility] [address] [place] [address]  
[phone]  
  
   
 [address] [place] [address] Upcoming Health Maintenance Date Due DTaP/Tdap/Td series (1 - Tdap) 5/19/1996 PAP AKA CERVICAL CYTOLOGY 5/19/1996 INFLUENZA AGE 9 TO ADULT 8/1/2016 Allergies as of 3/15/2017  Review Complete On: 3/15/2017 By: Isaiah Garcia MD  
 No Known Allergies Current Immunizations  Never Reviewed No immunizations on file. Not reviewed this visit You Were Diagnosed With   
  
 Codes Comments Chest pain, unspecified type    -  Primary ICD-10-CM: R07.9 ICD-9-CM: 786.50 Vitals BP Pulse Temp Resp Height(growth percentile) Weight(growth percentile) 132/84 78 98.1 °F (36.7 °C) (Temporal) 18 5' 3\" (1.6 m) 244 lb (110.7 kg) LMP SpO2 BMI OB Status Smoking Status 03/10/2017 98% 43.22 kg/m2 Having regular periods Never Smoker Vitals History BMI and BSA Data Body Mass Index Body Surface Area  
 43.22 kg/m 2 2.22 m 2 Preferred Pharmacy Pharmacy Name Phone [id number] - [address] [address] [phone] Your Updated Medication List  
  
   
This list is accurate as of: 3/15/17  3:57 PM.  Always use your most recent med list.  
  
  
  
  
 aluminum-magnesium hydroxide 200-200 mg/5 mL susp 5 mL, diphenhydrAMINE 12.5 mg/5 mL liqd 12.5 mg, lidocaine 2 % soln 5 mL 5 mL by Swish and Spit route two (2) times a day. Magic mouth wash  Maalox Lidocaine 2% viscous  Diphenhydramine oral solution   Pharmacy to mix equal portions of ingredients to a total volume as indicated in the dispense amount. diclofenac EC 75 mg EC tablet Commonly known as:  VOLTAREN Take 1 Tab by mouth two (2) times a day. ergocalciferol 50,000 unit capsule Commonly known as:  ERGOCALCIFEROL Take 1 Cap by mouth every seven (7) days. LORazepam 1 mg tablet Commonly known as:  ATIVAN Take 1/2 hour before MRI. May repeat if needed MEGARED OMEGA-3 KRILL OIL PO Take  by mouth. omeprazole 20 mg tablet Commonly known as:  PriLOSEC OTC Take 40 mg by mouth daily. PROBIOTIC & ACIDOPHILUS PO Take  by mouth. traMADol 50 mg tablet Commonly known as:  ULTRAM  
Take 1 Tab by mouth every six (6) hours as needed for Pain. Max Daily Amount: 200 mg. VITAMIN D3 2,000 unit Tab Generic drug:  cholecalciferol (vitamin D3) Take  by mouth. Prescriptions Sent to Pharmacy Refills  
 omeprazole (PRILOSEC OTC) 20 mg tablet 1 Sig: Take 40 mg by mouth daily. Class: Normal  
 Pharmacy: [address] #: [phone] Route: Oral  
  
Follow-up Instructions Return in about 1 month (around 4/15/2017). Introducing \[facility] [place] [facility]\"" & HEALTH SERVICES! Lesli Jerry introduces [employer] patient portal. Now you can access parts of your medical record, email your doctor's office, and request medication refills online. 1. In your internet browser, go to [email or web address] 2. Click on the First Time User? Click Here link in the Sign In box. You will see the New Member Sign Up page. 3. Enter your [employer] Access Code exactly as it appears below. You will not need to use this code after youve completed the sign-up process.  If you do not sign up before the expiration date, you must request a new code. · [employer] Access Code: [email or web address] Expires: 4/20/2017  4:55 PM 
 
4. Enter the last four digits of your Social Security Number (xxxx) and Date of Birth (mm/dd/yyyy) as indicated and click Submit. You will be taken to the next sign-up page. 5. Create a [employer] ID. This will be your [employer] login ID and cannot be changed, so think of one that is secure and easy to remember. 6. Create a [employer] password. You can change your password at any time. 7. Enter your Password Reset Question and Answer. This can be used at a later time if you forget your password. 8. Enter your e-mail address. You will receive e-mail notification when new information is available in [address] 9. Click Sign Up. You can now view and download portions of your medical record. 10. Click the Download Summary menu link to download a portable copy of your medical information. If you have questions, please visit the Frequently Asked Questions section of the [employer] website. Remember, [employer] is NOT to be used for urgent needs. For medical emergencies, dial 911. Now available from your iPhone and Android! Please provide this summary of care documentation to your next provider. Your primary care clinician is listed as [address]. If you have any questions after today's visit, please call [phone]. no

## 2024-11-14 NOTE — PROGRESS NOTE ADULT - ASSESSMENT
Assessment and Plan:    1. S/P Peripheral Angiography and   ·	Vascular Access:   ·	Bedrest for:   ·	DAPT:   ·	Statin:   ·	CBC, BMP, and magnesium in AM.  ·	Aggressive lifestyle modification and risk reduction.     2. HLD  ·	Goal: LDL < 70  ·	Lipid Panel:   ·	Statin:   ·	Other:     3. Tobacco dependence.   ·	Nicotine Replacement:   ·	     Patch: 14 mg daily  ·	     PRN Medication: Nicotine lozenge 4 mg every 3 hours as needed  ·	Smoking Cessation.    Discharge Planning:   ·	Discharge:   ·	Follow up with: Dr. Agrawal     Assessment and Plan:    1. S/P Peripheral Angiography and angioplasty and stent of the left iliac, SFA, and popliteal.   ·	Vascular Access: Pull right femoral artery sheath now  ·	Bedrest for: 4 hours post sheath pull  ·	DAPT: Will continue Plavix 75 mg daily and aspirin 81 mg daily  ·	Statin: Will continue Crestor 40 mg daily  ·	CBC, BMP, and magnesium in AM.  ·	Aggressive lifestyle modification and risk reduction.   ·	Will take patient back to [facility] for angiography of RLE.    2. HLD  ·	Goal: LDL < 70  ·	Lipid Panel: In AM  ·	Statin: Will continue Crestor 40 mg daily  ·	Other: N/A    3. Tobacco dependence.   ·	Nicotine Replacement:   ·	     Patch: 14 mg daily  ·	     PRN Medication: Nicotine lozenge 4 mg every 3 hours as needed  ·	Smoking Cessation.    Discharge Planning:   ·	Discharge: Possible tomorrow  ·	Follow up with: Dr. Agrawal

## 2024-11-14 NOTE — PROGRESS NOTE ADULT - SUBJECTIVE AND OBJECTIVE BOX
Josh Crenshaw --    Bayron Gomez D.O.                                                                              Department of Cardiology                                                                  [facility] [address] [address]                                                            Telephone: [phone]. Fax:[phone]                                                                                         PERIPHERAL NOTE     69yFemale S/P lower extremity angiography as stated below.  The patient denies chest pain, SOB, leg/foot pain or groin pain.    Left Lower Extremity       Iliac:        CFA:        SFA:        Profunda:        Popliteal:        AT:        PT:        Peroneal:     Right Lower Extremity       Iliac:        CFA:        SFA:        Profunda:        Popliteal:        AT:        PT:        Peroneal:     HPI: 68y/o female smoker with history of coronary artery disease, PCI 2/2024 LAD, multilevel PAD, R iliac intervention 2/2024, COPD, HTN, HLD, carotid and aortic disease, prediabetes. She states that she get left lower leg numbness and pain at rest, and right lower leg cramping when walking a few blocks, which is improved since PTA of the right LAUREL stent. She denies chest pain or SOB, although she states she does not exert herself too much do to her leg pain.    General: Awake, alert, oriented, in no acute distress   Chest: CTA, S1, S2, RRR  Right Groin: Right femoral artery sheath, soft, no bleeding, no hematoma  Extremities: Radial: Right: , Left: , DP: Right: , Left: , PT: Right: , Left:     Labs:                                                                                     Department of Cardiology                                                                  [facility]/[place] [address] [address]                                                            Telephone: [phone]. Fax:[phone]                                                                                         PERIPHERAL NOTE     69yFemale S/P lower extremity angiography as stated below.  The patient denies chest pain, SOB, but c/o right leg numbness and pain.    Left Lower Extremity       Iliac: 70% stenosis treated with a 4.5mm X 80mm X 120 Supera stent.       SFA: 100% occluded treated with Shockwave IVL and a 6.0mm X 120mm X 130cm Estela self-expanding stent in the mSFA and a 6.0mm X 120mm X 130cm Estela self-expanding stent in the dSFA, and a 6.0mm X 80mm Lutonix DCB in the pSFA.       Popliteal: 100% occluded treated with a 4.5mm X 80mm X 120cm Supera stent.       AT: Patent       PT: Patent       Peroneal: Patent    Right Lower Extremity       Iliac: Patent stent       CFA: Patent       SFA: Known to be occluded       Profunda: Patent    ·	Access/Hemostasis: Right femoral artery (manual pull), left pedal (pulled in CCL)  ·	Proceduralist: Wai Agrawal  ·	Heparin: 16,000 units  ·	Contrast: 580 mL Visipaque  ·	Antiplatelet: Plavix 600 mg  ·	IV fluid: 800 mL NS  ·	Other medications: Phenylephrine 100 mcg, Fentanyl 300 mcg, Versed 4.5 mg, Ancef 2 gm    HPI: 70y/o female smoker with history of coronary artery disease, PCI 2/2024 LAD, multilevel PAD, R iliac intervention 2/2024, COPD, HTN, HLD, carotid and aortic disease, prediabetes. She states that she get left lower leg numbness and pain at rest, and right lower leg cramping when walking a few blocks, which is improved since PTA of the right LAUREL stent. She denies chest pain or SOB, although she states she does not exert herself too much do to her leg pain.    General: Awake, alert, oriented, in no acute distress   Chest: CTA, S1, S2, RRR  Right Groin: Right femoral artery sheath, soft, no bleeding, no hematoma  Extremities: Radial: Right: 2+, Left: 2+, DP: Right: UTO, Left: + by doppler, PT: Right: UTO, Left: + by doppler. Left pedal access site, no bleeding.    Labs:

## 2024-11-14 NOTE — CHART NOTE - NSCHARTNOTEFT_GEN_A_CORE
- Pt s/p lower extremity angiography; 7F sheath in Left groin  - Labs reviewed prior to removal, WNL, ACT < 180   - Pt in supine postion, sutures removed  - Arterial sheath removed and manual pressure applied for 20 minutes; hemostasis achieved   - LF, DP, PT pulses intact +2 neurovascular status intact    - No bleeding or hematoma noted post sheath removal, sterile dressing applied   - Bedrest x 4 hours post sheath removal.   - Heparin to be restarted in 4 hours if groin site stable   - VS/LLE pulses/NV status/Left groin check [id number] x 4, [id number] x 2, q1hr x 2, then per protocol    - Notify provider immediately if any neurovascular change of LLE - Pt s/p lower extremity angiography; 7F sheath in Left groin  - Labs reviewed prior to removal, WNL, ACT < 180   - Pt in supine postion, sutures removed  - Arterial sheath removed and manual pressure applied for 20 minutes; hemostasis achieved   - LF, DP, PT pulses intact +2 neurovascular status intact    - No bleeding or hematoma noted post sheath removal, sterile dressing applied   - Bedrest x 4 hours post sheath removal.   - Heparin to be restarted in 4 hours (@ 2am) if groin site stable   - VS/LLE pulses/NV status/Left groin check [id number] x 4, [id number] x 2, q1hr x 2, then per protocol    - Notify provider immediately if any neurovascular change of LLE

## 2024-11-14 NOTE — ASU PATIENT PROFILE, ADULT - FALL HARM RISK - HARM RISK INTERVENTIONS

## 2024-11-15 ENCOUNTER — NON-APPOINTMENT (OUTPATIENT)
Age: 69
End: 2024-11-15

## 2024-11-15 LAB
A1C WITH ESTIMATED AVERAGE GLUCOSE RESULT: 5.3 % — SIGNIFICANT CHANGE UP (ref 4–5.6)
ABO RH CONFIRMATION: SIGNIFICANT CHANGE UP
ANION GAP SERPL CALC-SCNC: 15 MMOL/L — SIGNIFICANT CHANGE UP (ref 5–17)
ANION GAP SERPL CALC-SCNC: 15 MMOL/L — SIGNIFICANT CHANGE UP (ref 5–17)
BUN SERPL-MCNC: 17.9 MG/DL — SIGNIFICANT CHANGE UP (ref 8–20)
BUN SERPL-MCNC: 18.7 MG/DL — SIGNIFICANT CHANGE UP (ref 8–20)
CALCIUM SERPL-MCNC: 7.9 MG/DL — LOW (ref 8.4–10.5)
CALCIUM SERPL-MCNC: 8.1 MG/DL — LOW (ref 8.4–10.5)
CHLORIDE SERPL-SCNC: 103 MMOL/L — SIGNIFICANT CHANGE UP (ref 96–108)
CHLORIDE SERPL-SCNC: 104 MMOL/L — SIGNIFICANT CHANGE UP (ref 96–108)
CHOLEST SERPL-MCNC: 118 MG/DL — SIGNIFICANT CHANGE UP
CK MB CFR SERPL CALC: 5.8 NG/ML — SIGNIFICANT CHANGE UP (ref 0–6.7)
CK SERPL-CCNC: 152 U/L — SIGNIFICANT CHANGE UP (ref 25–170)
CK SERPL-CCNC: 82 U/L — SIGNIFICANT CHANGE UP (ref 25–170)
CO2 SERPL-SCNC: 17 MMOL/L — LOW (ref 22–29)
CO2 SERPL-SCNC: 19 MMOL/L — LOW (ref 22–29)
CREAT SERPL-MCNC: 0.87 MG/DL — SIGNIFICANT CHANGE UP (ref 0.5–1.3)
CREAT SERPL-MCNC: 0.91 MG/DL — SIGNIFICANT CHANGE UP (ref 0.5–1.3)
EGFR: 68 ML/MIN/1.73M2 — SIGNIFICANT CHANGE UP
EGFR: 72 ML/MIN/1.73M2 — SIGNIFICANT CHANGE UP
ESTIMATED AVERAGE GLUCOSE: 105 MG/DL — SIGNIFICANT CHANGE UP (ref 68–114)
GLUCOSE SERPL-MCNC: 112 MG/DL — HIGH (ref 70–99)
GLUCOSE SERPL-MCNC: 118 MG/DL — HIGH (ref 70–99)
HCT VFR BLD CALC: 18.2 % — CRITICAL LOW (ref 34.5–45)
HCT VFR BLD CALC: 18.4 % — CRITICAL LOW (ref 34.5–45)
HCT VFR BLD CALC: 21.8 % — LOW (ref 34.5–45)
HDLC SERPL-MCNC: 57 MG/DL — SIGNIFICANT CHANGE UP
HGB BLD-MCNC: 5.8 G/DL — CRITICAL LOW (ref 11.5–15.5)
HGB BLD-MCNC: 5.9 G/DL — CRITICAL LOW (ref 11.5–15.5)
HGB BLD-MCNC: 7.3 G/DL — LOW (ref 11.5–15.5)
LACTATE SERPL-SCNC: 1 MMOL/L — SIGNIFICANT CHANGE UP (ref 0.5–2)
LACTATE SERPL-SCNC: 1.2 MMOL/L — SIGNIFICANT CHANGE UP (ref 0.5–2)
LIPID PNL WITH DIRECT LDL SERPL: 46 MG/DL — SIGNIFICANT CHANGE UP
MAGNESIUM SERPL-MCNC: 1.5 MG/DL — LOW (ref 1.8–2.6)
MAGNESIUM SERPL-MCNC: 1.6 MG/DL — SIGNIFICANT CHANGE UP (ref 1.6–2.6)
MCHC RBC-ENTMCNC: 26.6 PG — LOW (ref 27–34)
MCHC RBC-ENTMCNC: 26.7 PG — LOW (ref 27–34)
MCHC RBC-ENTMCNC: 27.7 PG — SIGNIFICANT CHANGE UP (ref 27–34)
MCHC RBC-ENTMCNC: 31.9 G/DL — LOW (ref 32–36)
MCHC RBC-ENTMCNC: 32.1 G/DL — SIGNIFICANT CHANGE UP (ref 32–36)
MCHC RBC-ENTMCNC: 33.5 G/DL — SIGNIFICANT CHANGE UP (ref 32–36)
MCV RBC AUTO: 82.6 FL — SIGNIFICANT CHANGE UP (ref 80–100)
MCV RBC AUTO: 82.9 FL — SIGNIFICANT CHANGE UP (ref 80–100)
MCV RBC AUTO: 83.9 FL — SIGNIFICANT CHANGE UP (ref 80–100)
NON HDL CHOLESTEROL: 61 MG/DL — SIGNIFICANT CHANGE UP
PLATELET # BLD AUTO: 204 K/UL — SIGNIFICANT CHANGE UP (ref 150–400)
PLATELET # BLD AUTO: 260 K/UL — SIGNIFICANT CHANGE UP (ref 150–400)
PLATELET # BLD AUTO: 269 K/UL — SIGNIFICANT CHANGE UP (ref 150–400)
POTASSIUM SERPL-MCNC: 4.3 MMOL/L — SIGNIFICANT CHANGE UP (ref 3.5–5.3)
POTASSIUM SERPL-MCNC: 4.6 MMOL/L — SIGNIFICANT CHANGE UP (ref 3.5–5.3)
POTASSIUM SERPL-SCNC: 4.3 MMOL/L — SIGNIFICANT CHANGE UP (ref 3.5–5.3)
POTASSIUM SERPL-SCNC: 4.6 MMOL/L — SIGNIFICANT CHANGE UP (ref 3.5–5.3)
RBC # BLD: 2.17 M/UL — LOW (ref 3.8–5.2)
RBC # BLD: 2.22 M/UL — LOW (ref 3.8–5.2)
RBC # BLD: 2.64 M/UL — LOW (ref 3.8–5.2)
RBC # FLD: 14.9 % — HIGH (ref 10.3–14.5)
RBC # FLD: 16.1 % — HIGH (ref 10.3–14.5)
RBC # FLD: 16.2 % — HIGH (ref 10.3–14.5)
SODIUM SERPL-SCNC: 136 MMOL/L — SIGNIFICANT CHANGE UP (ref 135–145)
SODIUM SERPL-SCNC: 137 MMOL/L — SIGNIFICANT CHANGE UP (ref 135–145)
TRIGL SERPL-MCNC: 76 MG/DL — SIGNIFICANT CHANGE UP
WBC # BLD: 11.58 K/UL — HIGH (ref 3.8–10.5)
WBC # BLD: 13.7 K/UL — HIGH (ref 3.8–10.5)
WBC # BLD: 13.71 K/UL — HIGH (ref 3.8–10.5)
WBC # FLD AUTO: 11.58 K/UL — HIGH (ref 3.8–10.5)
WBC # FLD AUTO: 13.7 K/UL — HIGH (ref 3.8–10.5)
WBC # FLD AUTO: 13.71 K/UL — HIGH (ref 3.8–10.5)

## 2024-11-15 PROCEDURE — 99223 1ST HOSP IP/OBS HIGH 75: CPT | Mod: GC

## 2024-11-15 PROCEDURE — 99232 SBSQ HOSP IP/OBS MODERATE 35: CPT

## 2024-11-15 PROCEDURE — 99223 1ST HOSP IP/OBS HIGH 75: CPT

## 2024-11-15 PROCEDURE — 74176 CT ABD & PELVIS W/O CONTRAST: CPT | Mod: 26

## 2024-11-15 PROCEDURE — 93010 ELECTROCARDIOGRAM REPORT: CPT

## 2024-11-15 PROCEDURE — 93925 LOWER EXTREMITY STUDY: CPT | Mod: 26

## 2024-11-15 RX ORDER — MAGNESIUM, ALUMINUM HYDROXIDE 200-225/5
30 SUSPENSION, ORAL (FINAL DOSE FORM) ORAL ONCE
Refills: 0 | Status: COMPLETED | OUTPATIENT
Start: 2024-11-15 | End: 2024-11-15

## 2024-11-15 RX ORDER — ACETAMINOPHEN 500MG 500 MG/1
650 TABLET, COATED ORAL ONCE
Refills: 0 | Status: COMPLETED | OUTPATIENT
Start: 2024-11-15 | End: 2024-11-15

## 2024-11-15 RX ORDER — INFLUENZA VIRUS VACCINE 15; 15; 15; 15 UG/.5ML; UG/.5ML; UG/.5ML; UG/.5ML
0.5 SUSPENSION INTRAMUSCULAR ONCE
Refills: 0 | Status: DISCONTINUED | OUTPATIENT
Start: 2024-11-15 | End: 2024-11-19

## 2024-11-15 RX ORDER — PANTOPRAZOLE SODIUM 40 MG/1
40 TABLET, DELAYED RELEASE ORAL
Refills: 0 | Status: DISCONTINUED | OUTPATIENT
Start: 2024-11-15 | End: 2024-11-19

## 2024-11-15 RX ORDER — MAGNESIUM, ALUMINUM HYDROXIDE 200-225/5
30 SUSPENSION, ORAL (FINAL DOSE FORM) ORAL ONCE
Refills: 0 | Status: COMPLETED | OUTPATIENT
Start: 2024-11-15 | End: 2024-11-17

## 2024-11-15 RX ORDER — IRON SUCROSE 20 MG/ML
100 INJECTION, SOLUTION INTRAVENOUS EVERY 24 HOURS
Refills: 0 | Status: COMPLETED | OUTPATIENT
Start: 2024-11-15 | End: 2024-11-16

## 2024-11-15 RX ORDER — ACETAMINOPHEN 500MG 500 MG/1
1000 TABLET, COATED ORAL ONCE
Refills: 0 | Status: COMPLETED | OUTPATIENT
Start: 2024-11-15 | End: 2024-11-15

## 2024-11-15 RX ADMIN — ACETAMINOPHEN 500MG 1000 MILLIGRAM(S): 500 TABLET, COATED ORAL at 13:23

## 2024-11-15 RX ADMIN — Medication 112 MICROGRAM(S): at 05:41

## 2024-11-15 RX ADMIN — ROSUVASTATIN CALCIUM 40 MILLIGRAM(S): 5 TABLET, FILM COATED ORAL at 05:41

## 2024-11-15 RX ADMIN — ACETAMINOPHEN 500MG 650 MILLIGRAM(S): 500 TABLET, COATED ORAL at 07:28

## 2024-11-15 RX ADMIN — Medication 10 UNIT(S)/HR: at 02:00

## 2024-11-15 RX ADMIN — Medication 81 MILLIGRAM(S): at 13:18

## 2024-11-15 RX ADMIN — ACETAMINOPHEN 500MG 400 MILLIGRAM(S): 500 TABLET, COATED ORAL at 12:23

## 2024-11-15 RX ADMIN — IRON SUCROSE 210 MILLIGRAM(S): 20 INJECTION, SOLUTION INTRAVENOUS at 16:02

## 2024-11-15 RX ADMIN — Medication 30 MILLILITER(S): at 15:44

## 2024-11-15 RX ADMIN — CLOPIDOGREL 75 MILLIGRAM(S): 75 TABLET, FILM COATED ORAL at 13:18

## 2024-11-15 RX ADMIN — SODIUM CHLORIDE 50 MILLILITER(S): 9 INJECTION, SOLUTION INTRAMUSCULAR; INTRAVENOUS; SUBCUTANEOUS at 05:46

## 2024-11-15 RX ADMIN — PANTOPRAZOLE SODIUM 40 MILLIGRAM(S): 40 TABLET, DELAYED RELEASE ORAL at 17:40

## 2024-11-15 RX ADMIN — ROSUVASTATIN CALCIUM 40 MILLIGRAM(S): 5 TABLET, FILM COATED ORAL at 21:51

## 2024-11-15 RX ADMIN — ACETAMINOPHEN 500MG 650 MILLIGRAM(S): 500 TABLET, COATED ORAL at 08:28

## 2024-11-15 RX ADMIN — SODIUM CHLORIDE 50 MILLILITER(S): 9 INJECTION, SOLUTION INTRAMUSCULAR; INTRAVENOUS; SUBCUTANEOUS at 02:00

## 2024-11-15 RX ADMIN — LOSARTAN POTASSIUM 50 MILLIGRAM(S): 100 TABLET, FILM COATED ORAL at 05:41

## 2024-11-15 RX ADMIN — PANTOPRAZOLE SODIUM 40 MILLIGRAM(S): 40 TABLET, DELAYED RELEASE ORAL at 06:40

## 2024-11-15 RX ADMIN — Medication 2000 MILLIGRAM(S): at 05:41

## 2024-11-15 RX ADMIN — SERTRALINE HYDROCHLORIDE 150 MILLIGRAM(S): 100 TABLET, FILM COATED ORAL at 12:23

## 2024-11-15 RX ADMIN — AMLODIPINE BESYLATE 10 MILLIGRAM(S): 10 TABLET ORAL at 05:41

## 2024-11-15 NOTE — PROVIDER CONTACT NOTE (CRITICAL VALUE NOTIFICATION) - ACTION/TREATMENT ORDERED:
repeat CMC, BMP, and Mag.
type + screen, IV push protonix, once type + screen comes back will order 1 unit PRBC's

## 2024-11-15 NOTE — H&P ADULT - ASSESSMENT
69y/oF smoker PMH CAD, PCI 2/2024 LAD, multilevel PAD, R iliac intervention 2/2024, COPD, HTN, HLD, carotid and aortic disease, prediabetes. She states that she get left lower leg numbness and pain at rest, and right lower leg cramping when walking a few blocks, which is improved since PTA of the right LAUREL stent. She denies chest pain or SOB, although she states she does not exert herself too much do to her leg pain.   Pt now s/p peripheral angio and angioplasty with stent to L iliac, SFA and popliteal arteries 11/14.  Pt then c/o RLE numbness, pain, with inability to move foot and unobtainable DP or PT pulses with doppler. Sheath pulled emergently, taken emergently back to cath lab for angio, which revealed R CFA thrombus. Thrombectomy and balloon angio performed, started on heparin gtt, which remained o/n. In am, labs significant for drop in Hb to 5.9, also hypotensive. 2u PRBC ordered. CT a/p without RP bleed. Hypotension improving. MICU consulted, deemed stable to remain on floor.     Severe PAD   s/p peripheral angio with angioplasty and stent of L iliac, SFA and popliteal arteries   s/p R CFA thrombus aspiration   -cont DAPT   -cont statin   -cont neurovascular checks   -BLE precautions as per interventional cardio     Acute on chronic anemia   Iron deficiency anemia   Hx AVMs   -hx of avm cauterizations  -gets regular iron infusions   -CT a/p without RP hematoma, +trace infiltration/small hemorrhagic fluid in L groin extending to L external iliac region, no sig hematoma   -micu consult appreciated   -IV Venofer x2, ordered by cardio   -cont po iron on dc   -transfuse 2u PRBC today  -f/u post-transfusion labs   -outpt GI f/u 1-2 weeks   -f/u heme/onc 1-2 weeks     HTN, HLD   -cont statin  -holding antihypertensives due to hypotension today     Tobacco dependence   -nicotine patch   -cessation advised     dispo: anticipate home when medically stable, poss 2-3 days

## 2024-11-15 NOTE — PROVIDER CONTACT NOTE (CRITICAL VALUE NOTIFICATION) - ASSESSMENT
patient is A&Ox4, no complaints at this time. BL Groins c/d/i, no hematoma present.
patient is A&Ox4, no complaints at this time. BL Groins c/d/i, no hematoma present. VSS.

## 2024-11-15 NOTE — H&P ADULT - NSHPLABSRESULTS_GEN_ALL_CORE
5.9    13.70 )-----------( 260      ( 15 Nov 2024 05:35 )             18.4   11-15    136  |  104  |  18.7  ----------------------------<  112[H]  4.3   |  17.0[L]  |  0.87    Ca    7.9[L]      15 Nov 2024 05:35  Mg     1.6     11-15      < from: CT Abdomen and Pelvis No Cont (11.15.24 @ 09:27) >    PROCEDURE:  CT of the Abdomen and Pelvis was performed.  Sagittal and coronal reformats were performed.    FINDINGS:  LOWER CHEST: Within normal limits.    LIVER: Within normal limits.  BILE DUCTS: Normal caliber.  GALLBLADDER: Residual contrast in the gallbladder.  SPLEEN: Within normal limits.  PANCREAS: Within normal limits.  ADRENALS: Within normal limits.  KIDNEYS/URETERS: Residual contrast in the bilateral renal parenchyma and   renal collecting system correlate for timing of recent contrast   administration. Correlate with creatinine levels to exclude ATN from   contrast administration    BLADDER: Residual contrast in the bladder.  REPRODUCTIVE ORGANS: No pelvic mass. No pelvic free fluid.    BOWEL: No bowel obstruction. Appendix is normal.  PERITONEUM/RETROPERITONEUM: No ascites. No significant retroperitoneal   hematoma identified.  VESSELS: Atherosclerotic changes.  LYMPH NODES: No lymphadenopathy.  ABDOMINAL WALL: Minimal stranding/isodense fluid seen in the left groin   extending into the left external iliac region likely representing   postprocedural changes. No significant hematoma identified.  BONES: Degenerative changes.    IMPRESSION:  No significant retroperitoneal hematomas. Trace infiltration/small   hemorrhagic fluid in the left groin/sec left external iliac region.    < end of copied text >

## 2024-11-15 NOTE — PROGRESS NOTE ADULT - SUBJECTIVE AND OBJECTIVE BOX
Department of Cardiology                                                                  [place] [address]                                                            Telephone: [phone]. Fax:[phone]                                                                                      Cardiac Cath NP Note                                                                                           Department of Cardiology                                                                  [place] [address]                                                            Telephone: [phone]. Fax:[phone]                                                                                      Cardiac Cath NP Note           Patient is a 69y old  Female who presents with a chief complaint of Left SFA/popliteal intervention (2024 12:59)    Patient is a 69y old  Female who presents with a chief complaint of Left SFA/popliteal intervention (2024 12:59)  Overnight events: Post Peripheral angio , Drop in hb overnight    Plan: Transfusion support, CT A/P to R/O RP bleed , Iron infusion    HPI:  68y/o female smoker with history of coronary artery disease, PCI 2024 LAD, multilevel PAD, R iliac intervention 2024, COPD, HTN, HLD, carotid and aortic disease, prediabetes. She states that she get left lower leg numbness and pain at rest, and right lower leg cramping when walking a few blocks, which is improved since PTA of the right LAUREL stent.   BOLA on  revealed  BOLA of 0.40. Mildly abnormal ankle waveforms. No significant changes post exercise. very faint digit waveform TBI not obtainable.  Patient underwent Peripheral Angiography and angioplasty and stent of the left iliac, SFA, and popliteal., Vascular Access: RFA, S/P sheath pull overnight   Post cath in an hour, Patient c/o RLE numbness and pain, unable to move foot. Unable to obtain doppler DP or PT pulses. Sheath pulled emergently pulled with no improvement. Patient taken emergently back to the [facility] and angiography VIA LFA Access, revealed a right CFA thrombus. thrombectomy and balloon angioplasty performed.s/p left FA sheath pull overnight  Post cath Today am, HB/HCT dropped to 5.9/18.4, 2 units of PRBC transfusion ordered, pending to transfuse.      ROS:   General: No fevers/chills, c/o bring fatigued  HEENT: No visual disturbances, no hearing loss, no headaches, no epistaxis.  CV: No chest pain, no KELLY, no palpitations, no edema, no orthopnea, no PND.   Respiratory: No dyspnea, no wheeze, no cough.  GI: no nausea/vomiting, no black/bloody stools.  : No hematuria  Musculoskeletal: No myalgias, no arthralgias, no back pain.  Neurologic: No weakness, no hemiparesis, no paresthesias, no seizures, no syncope/near syncope. sensation intact    T(C): 36.5 (24 @ 08:16), Max: 36.5 (24 @ 08:16)  HR: 69 (24 @ 08:16)  BP: 142/75 (24 @ 08:16)  RR: 17 (24 @ 08:16)  SpO2: 99% (24 @ 08:16)    Physical Examination:   General: Awake, alert, speech clear, no acute distress.  HEENT: PERRL, EOMI.  Neck: No elevated JVP, no bruit, trachea midline.  Chest: CTA B/L, S1, S2, no murmur, RRR.  Abdomen: Soft, nontender, nondistended, normal bowel sounds.  Extremities: Radial: Right: 2+, Left: 2+,PT RT AND LEFT: DOPPLERABLE, B/L DP Not palpable or doipplerable   Neurologic: A&OX3, CN 2-12 grossly intact.          PAST MEDICAL & SURGICAL HISTORY:  HTN (hypertension)  x 15 years, controlled      Depression      Graves disease      Hyperlipidemia      S/P  section      S/P tubal ligation  Allergies    No Known Allergies    Intolerances    Naprosyn (Other)  aspirin (Other)      MEDICATIONS  (STANDING):  amLODIPine   Tablet 10 milliGRAM(s) Oral daily  aspirin  chewable 81 milliGRAM(s) Oral daily  chlorhexidine 4% Liquid 1 Application(s) Topical Once  clopidogrel Tablet 75 milliGRAM(s) Oral daily  iron sucrose IVPB 100 milliGRAM(s) IV Intermittent every 24 hours  levothyroxine 112 MICROGram(s) Oral daily  losartan 50 milliGRAM(s) Oral daily  nicotine -  14 mG/24Hr(s) Patch 1 Patch Transdermal daily  pantoprazole  Injectable 40 milliGRAM(s) IV Push two times a day  rosuvastatin 40 milliGRAM(s) Oral at bedtime  sertraline 150 milliGRAM(s) Oral daily  sodium chloride 0.9%. 1000 milliLiter(s) (50 mL/Hr) IV Continuous <Continuous>    MEDICATIONS  (PRN):  nicotine  Polacrilex Lozenge 4 milliGRAM(s) Oral every 3 hours PRN craving      INTERPRETATION OF TELEMETRY:    ECG: SR    LABS:                        5.9    13.70 )-----------( 260      ( 15 Nov 2024 05:35 )             18.4     11-15    136  |  104  |  18.7  ----------------------------<  112[H]  4.3   |  17.0[L]  |  0.87    Ca    7.9[L]      15 Nov 2024 05:35  Mg     1.6     11-15            Urinalysis Basic - ( 15 Nov 2024 05:35 )    Color: x / Appearance: x / SG: x / pH: x  Gluc: 112 mg/dL / Ketone: x  / Bili: x / Urobili: x   Blood: x / Protein: x / Nitrite: x   Leuk Esterase: x / RBC: x / WBC x   Sq Epi: x / Non Sq Epi: x / Bacteria: x      I&O's Summary    2024 07:01  -  15 Nov 2024 07:00  --------------------------------------------------------  IN: 1040 mL / OUT: 300 mL / NET: 740 mL

## 2024-11-15 NOTE — CHART NOTE - NSCHARTNOTEFT_GEN_A_CORE
RN notified pt's H/H 5.8/18.2 this AM   - Pt assessed at bedside, groin access sites unremarkable  - VSS, pt A&Ox4, asymptomatic   - Heparin drip discontinued  - Start protonix 40mg IVP BID   - Pending stool OB  - Discussed with Dr. Agrawal, plan to administer 1 unit of PRBC and reassess  - Consent and T&S to be obtained RN notified pt's H/H 5.8/18.2 this AM   - Pt assessed at bedside, groin access sites unremarkable  - VSS, pt A&Ox4, asymptomatic   - Heparin drip discontinued  - Start protonix 40mg IVP BID   - Pending stool OB  - Discussed with Dr. Agrawal, plan to administer 1 unit of PRBC and reassess  - Consent and T&S to be obtained  - Signed out to IC ACP Lori to continue to follow RN notified pt's H/H 5.8/18.2 this AM   - Pt assessed at bedside, groin access sites unremarkable; VSS, pt A&Ox4  - Heparin drip discontinued  - Start protonix 40mg IVP BID   - Pending stool OB  - Discussed with Dr. Agrawal, plan to administer 1 unit of PRBC and reassess  - T&S to be obtained, consent obtained  - Signed out to IC BRANDT Sandoval to continue to follow

## 2024-11-15 NOTE — PROGRESS NOTE ADULT - ASSESSMENT
68y/o female smoker with history of coronary artery disease, PCI 2/2024 LAD, multilevel PAD, R iliac intervention 2/2024, COPD, HTN, HLD, carotid and aortic disease, prediabetes. She states that she get left lower leg numbness and pain at rest, and right lower leg cramping when walking a few blocks, which is improved since PTA of the right LAUREL stent.   BOLA on 09/24 revealed  BOLA of 0.40. Mildly abnormal ankle waveforms. No significant changes post exercise. very faint digit waveform TBI not obtainable.  Patient underwent Peripheral Angiography and angioplasty and stent of the left iliac, SFA, and popliteal., Vascular Access: RFA, S/P sheath pull overnight   Post cath in an hour, Patient c/o RLE numbness and pain, unable to move foot. Unable to obtain doppler DP or PT pulses. Sheath pulled emergently pulled with no improvement. Patient taken emergently back to the [facility] and angiography VIA LFA Access, revealed a right CFA thrombus. thrombectomy and balloon angioplasty performed.s/p left FA sheath pull overnight  Post cath Today am, HB/HCT dropped to 5.9/18.4, 2 units of PRBC transfusion ordered, pending to transfuse.  B/L Groin benign, no bleeding or hematoma, B/L PT pulses dopplerable, no DP pulses dopplerable  B/L LE warm, perfusing, sensation intact, normal motor function, patient ambulated today with no leg pain or claudication.    PROBLEM# 1    # Severe PAD/ S/P Peripheral angio with angioplasty and stent of the left iliac, SFA, and popliteal./CFA thrombus aspiration    PLAN  Patient underwent Peripheral Angiography and angioplasty and stent of the left iliac, SFA, and popliteal., Vascular Access: RFA, S/P sheath pull overnight   Post cath in an hour, Patient c/o RLE numbness and pain, unable to move foot. Unable to obtain doppler DP or PT pulses. Sheath pulled emergently pulled with no improvement. Patient taken emergently back to the [facility] and angiography VIA LFA Access, revealed a right CFA thrombus. thrombectomy and balloon angioplasty performed.s/p left FA sheath pull overnight  Post cath Today am, HB/HCT dropped to 5.9/18.4, 2 units of PRBC transfusion ordered, pending to transfuse.  B/L Groin benign, no bleeding or hematoma, B/L PT pulses dopplerable, no DP pulses dopplerable  B/L LE warm, perfusing, sensation intact, normal motor function, patient ambulated today with no leg pain or claudication.    -C/W DAPT  -C/W STATIN  -NV check of B/l arm q 8 hourly  -DAPT/ Medication complaince reinforced with the patient   -C/W Statin   -B/L LE precautions  - Follow up with DR Agrawal in one week post discharge     # Problem # 2    PLAN    # Acute anemia  Hb dropped from baseline 9sh to 5.9/18 on am labs  Pt with lightheadedness while ambulate  Hx of AVMs and avm cauterizations  in the past, JASSI, she gets IV iron in every few weeks   - Transfusion support with 2 units of PRBC   - CBC post 2 units of transfusion of prbc  -CT A/P urgent to R/O RP bleed , will follow up results  - IV Venefer x2 doses and discharge the patient on PO iron suppolement  -Will call GI conmsult if pt not responding to Transfuion  -OP Gi consult in 1-2 weeks  -OP Hematology follow up in 1-2 weeks     Problem/Plan - 3  ·  Problem: Tobacco dependence.   ·  Plan: Nicotine Replacement:        Patch: 21 mg daily       PRN Medication: Nicotine lozenge 4 mg every 3 hours as needed  Smoking Cessation education provided      Problem/Plan - 3:  ·  Problem: Hyperlipidemia, unspecified hyperlipidemia type.   ·  Plan: Goal Non-HDL < 100, LDL < 70  Statin: Will continue Crestor 40 mg daily  Other: N/A.                         68y/o female smoker with history of coronary artery disease, PCI 2/2024 LAD, multilevel PAD, R iliac intervention 2/2024, COPD, HTN, HLD, carotid and aortic disease, prediabetes. She states that she get left lower leg numbness and pain at rest, and right lower leg cramping when walking a few blocks, which is improved since PTA of the right LAUREL stent.   BOLA on 09/24 revealed  BOLA of 0.40. Mildly abnormal ankle waveforms. No significant changes post exercise. very faint digit waveform TBI not obtainable.  Patient underwent Peripheral Angiography and angioplasty and stent of the left iliac, SFA, and popliteal., Vascular Access: RFA, S/P sheath pull overnight   Post cath in an hour, Patient c/o RLE numbness and pain, unable to move foot. Unable to obtain doppler DP or PT pulses. Sheath pulled emergently pulled with no improvement. Patient taken emergently back to the [facility] and angiography VIA LFA Access, revealed a right CFA thrombus. thrombectomy and balloon angioplasty performed.s/p left FA sheath pull overnight  Post cath Today am, HB/HCT dropped to 5.9/18.4, 2 units of PRBC transfusion ordered, pending to transfuse.  B/L Groin benign, no bleeding or hematoma, B/L PT pulses dopplerable, no DP pulses dopplerable  B/L LE warm, perfusing, sensation intact, normal motor function, patient ambulated today with no leg pain or claudication.    PROBLEM# 1    # Severe PAD/ S/P Peripheral angio with angioplasty and stent of the left iliac, SFA, and popliteal./CFA thrombus aspiration    PLAN  Patient underwent Peripheral Angiography and angioplasty and stent of the left iliac, SFA, and popliteal., Vascular Access: RFA, S/P sheath pull overnight   Post cath in an hour, Patient c/o RLE numbness and pain, unable to move foot. Unable to obtain doppler DP or PT pulses. Sheath pulled emergently pulled with no improvement. Patient taken emergently back to the [facility] and angiography VIA LFA Access, revealed a right CFA thrombus. thrombectomy and balloon angioplasty performed.s/p left FA sheath pull overnight  Post cath Today am, HB/HCT dropped to 5.9/18.4, 2 units of PRBC transfusion ordered, pending to transfuse.  B/L Groin benign, no bleeding or hematoma, B/L PT pulses dopplerable, no DP pulses dopplerable  B/L LE warm, perfusing, sensation intact, normal motor function, patient ambulated today with no leg pain or claudication.  -She received large amount of iv contrast for the procedure,(590 ml )  monitor renal function closely     -C/W DAPT  -C/W STATIN  -NV check of B/l arm q 8 hourly  -DAPT/ Medication complaince reinforced with the patient   -C/W Statin   -B/L LE precautions  - Follow up with DR Agrawal in one week post discharge   -B/L LE benign  # Problem # 2    PLAN    # Acute anemia  Hb dropped from baseline 9sh to 5.9/18 on am labs  Patient spent prolonged time in cath lab for procedure (close to 7 hours) , and 2 hours for repeat procedure of CFA thrombectomy   Pt with lightheadedness while ambulate  Hx of AVMs and avm cauterizations  in the past, JASSI, she gets IV iron in every few weeks   - Transfusion support with 2 units of PRBC   - CBC post 2 units of transfusion of prbc  -CT A/P urgent to R/O RP bleed , will follow up results  - IV Venefer x2 doses and discharge the patient on PO iron suppolement  -Will call GI conmsult if pt not responding to Transfuion  -OP Gi consult in 1-2 weeks  -OP Hematology follow up in 1-2 weeks     Problem/Plan - 3  ·  Problem: Tobacco dependence.   ·  Plan: Nicotine Replacement:        Patch: 21 mg daily       PRN Medication: Nicotine lozenge 4 mg every 3 hours as needed  Smoking Cessation education provided      Problem/Plan - 3:  ·  Problem: Hyperlipidemia, unspecified hyperlipidemia type.   ·  Plan: Goal Non-HDL < 100, LDL < 70  Statin: Will continue Crestor 40 mg daily  Other: N/A.

## 2024-11-15 NOTE — CONSULT NOTE ADULT - ATTENDING COMMENTS
69 F, h/o smoking, CAD s/p PCI 2/2024 LAD, multilevel PAD, R iliac intervention 2/2024, COPD, HTN, HLD, carotid and aortic disease, prediabetes. admitted under cardiology service for evaluation of RLE claudication symptoms on ambulation. Pt underwent Peripheral Angiography and angioplasty and stent of the left iliac, SFA, and popliteal. Vascular Access: RFA, S/P sheath pull overnight. Post cath in an hour, Patient c/o RLE numbness and pain, unable to move foot. Unable to obtain doppler DP or PT pulses. Sheath pulled emergently pulled with no improvement. Patient taken emergently back to the [facility] and angiography VIA LFA Access, revealed a right CFA thrombus. thrombectomy and balloon angioplasty performed.s/p left FA sheath pull overnight - Post cath Today am, HB/HCT dropped to 5.9/18.4, 2 units of PRBC transfusion transfused and pt has no significant claudication symptoms present in RLE. Thighs are equal in size. CT A/P showed no source of bleeding. Please assess response to transfusion. BP stable. She feels better after blood transfusion. Discussed w/ primary team.    Patient currently does not require MICU level of care. Please don't hesitate to reach out to us for any changes in clinical status.

## 2024-11-15 NOTE — H&P ADULT - HISTORY OF PRESENT ILLNESS
68y/o female smoker with history of coronary artery disease, PCI 2/2024 LAD, multilevel PAD, R iliac intervention 2/2024, COPD, HTN, HLD, carotid and aortic disease, prediabetes. She states that she get left lower leg numbness and pain at rest, and right lower leg cramping when walking a few blocks, which is improved since PTA of the right LAUREL stent. She denies chest pain or SOB, although she states she does not exert herself too much do to her leg pain. (13-Nov-2024)    Pt s/p peripheral angio and angioplasty with stent to L iliac, SFA and popliteal arteries 11/14.  Pt then c/o RLE numbness, pain, with inability to move foot and unobtainable DP or PT pulses with doppler. Sheath pulled emergently, taken emergently back to cath lab for angio, which revealed R CFA thrombus. Thrombectomy and balloon angio performed, started on heparin gtt, which remained o/n. In am, labs significant for drop in Hb to 5.9, also hypotensive. 2u PRBC ordered. CT a/p without RP bleed. Hypotension improving. MICU consulted, deemed stable to remain on floor.  70y/o female smoker with history of coronary artery disease, PCI 2/2024 LAD, multilevel PAD, R iliac intervention 2/2024, COPD, HTN, HLD, carotid and aortic disease, prediabetes. She states that she get left lower leg numbness and pain at rest, and right lower leg cramping when walking a few blocks, which is improved since PTA of the right LAUREL stent. She denies chest pain or SOB, although she states she does not exert herself too much do to her leg pain. (13-Nov-2024)    Pt s/p peripheral angio and angioplasty with stent to L iliac, SFA and popliteal arteries 11/14.  Pt then c/o RLE numbness, pain, with inability to move foot and unobtainable DP or PT pulses with doppler. Sheath pulled emergently, taken emergently back to cath lab for angio, which revealed R CFA thrombus. Thrombectomy and balloon angio performed, started on heparin gtt, which remained o/n. In am, labs significant for drop in Hb to 5.9, also hypotensive. 2u PRBC ordered. CT a/p without RP bleed. Hypotension improving. MICU consulted, deemed stable to remain on floor.       Pt seen and examined. s/p 1u prbc so far. Denies HA, CP, SOB, abd pain, n/v, LE pain/numbness.

## 2024-11-15 NOTE — PATIENT PROFILE ADULT - FALL HARM RISK - HARM RISK INTERVENTIONS
Assistance with ambulation/Assistance OOB with selected safe patient handling equipment/Communicate Risk of Fall with Harm to all staff/Monitor gait and stability/Reinforce activity limits and safety measures with patient and family/Sit up slowly, dangle for a short time, stand at bedside before walking/Tailored Fall Risk Interventions/Visual Cue: Yellow wristband and red socks/Bed in lowest position, wheels locked, appropriate side rails in place/Call bell, personal items and telephone in reach/Instruct patient to call for assistance before getting out of bed or chair/Non-slip footwear when patient is out of bed/[place] to call system/Physically safe environment - no spills, clutter or unnecessary equipment/Purposeful Proactive Rounding/Room/bathroom lighting operational, light cord in reach

## 2024-11-15 NOTE — CONSULT NOTE ADULT - SUBJECTIVE AND OBJECTIVE BOX
MICU NOTE    CHIEF COMPLAINT: Patient is a 69y old  Female who presents with a chief complaint of Left SFA/popliteal intervention (2024 12:59)      HPI:  68y/o female smoker with history of coronary artery disease, PCI 2024 LAD, multilevel PAD, R iliac intervention 2024, COPD, HTN, HLD, carotid and aortic disease, prediabetes. She states that she get left lower leg numbness and pain at rest, and right lower leg cramping when walking a few blocks, which is improved since PTA of the right LAUREL stent. She denies chest pain or SOB, although she states she does not exert herself too much do to her leg pain.    Risk Factors for PAD       Age: 69       DM: N/A       Smoking History: Yes       Hyperlipidemia: N/A       Hypertension: N/A       Family History of PAD: N/A       Known Atherosclerotic Disease: Known CAD and carotid artery disease    Subjective Complaints:        Claudication Katerin Class: 3       Impaired Walking Function: N/A       Ischemic Rest Pain: Yes       Other Non-Joint Related Exertional Lower Extremity Symptoms (not typical of claudication): N/A    Objective Findings:        Lower Extremity Pulses: Unable to palpate DP pulses, doppler pulses noted.       Nonhealing Lower Extremity Wound: N/A       Lower Extremity Gangrene: N/A       Vascular Bruit: N/A       Other Suggestive Lower Extremity Findings (elevation pallor, dependent rubor): N/A    Peripheral Angiography: 2024  Infrarenal aorta: ectatic   Right lower extremity:   ·	Patent RCIA stent (7 X 57 X 135 EXPRESS Bare Metal Stent).   ·	Moderate EIA disease.   ·	Ostial RSFA blunt stump occlusion.   ·	Patent proximal profunda.   ·	The rest not injected known from prior   Left lower extremity:   ·	LAUREL patent.   ·	EIA 90% diffuse disease. A successful Balloon angioplasty was performed using a CATH IVL SHOCKWAVE M5 PLUS 5.5X60MM balloon. A successful Self-Expanding Stent was deployed using a 6 X 40 X 130 PAUL stent. A successful Balloon angioplasty was performed using a 6.0 X 40 X 75 MUSTANG balloon.  ·	CFA moderate.   ·	SFA diffuse disease until mid segment  reconstitution at P3. below knee appears to reconstitute into 3 vessel runoff.   ·	Patent profunda. IIA diffuse disease.    C/Peripheral Angiography: 2024   Coronary Angiography   The coronary circulation is right dominant. Cardiac catheterization was performed electively.  LM   ·	Left main artery: dual ostia of LAD/LCx.    LAD   ·	Mid left anterior descending: There is a 70 % stenosis. A successful Drug Eluting Stent was deployed using a 2.75 X 26 MM BAMBI FRONTIER RX LOYDA.   CX   ·	Circumflex: Angiography shows moderate atherosclerosis.    RCA   ·	Right coronary artery: Angiography shows moderate atherosclerosis. Diffuse 30%.  Peripheral Angiography:  RIGHT LOWER EXTREMITY:    ·	LAUREL: 80% heavily calcified   ·	EIA: 70% right at IIA takeoff   ·	IIA: appears occluded   ·	CFA: patent   ·	SFA: ostial occlusion and heavily calcified throughout with reconstitution at adductor canal  ·	PFA: ostial 70%   ·	Popliteal: moderate athero   ·	Below knee: appears 2 vessel runoff with AT and peroneal   LEFT LOWER EXTREMITY:    ·	LAUREL: patent   ·	EIA: diffuse 60-70%   ·	IIA: ostial 50%   ·	CFA: patent    ·	SFA: ostial 30%, diffuse prox to mid 50% followed by heavily calcified, 90% mid and distal SFA serial lesions.  ·	PFA: patent    Vascular Testing:        BOLA: 2024  Right Lower Extremity Arteries:  ·	Moderately abnormal BOLA 0.64. Mildly abnormal ankle waveforms. Decrease in BOLA post exercise. TBI 0.35.  Left Lower Extremity Arteries:  ·	Abnormal BOLA 0.40. Mildly abnormal ankle waveforms. No significant changes post exercise. very faint digit waveform TBI not obtainable.    BILATERAL LOWER EXTREMITY ARTERIAL ULTRASOUND: 10/21/2024  Right Lower Extremity Arteries:  ·	EIA: The right external iliac artery demonstrates moderate, heterogeneous atherosclerotic plaque and multiphasic flow.  ·	CFA: The right common femoral artery demonstrates moderate, heterogeneous atherosclerotic plaque and multiphasic flow.  ·	DFA: The right deep femoral artery demonstrates moderate, heterogeneous atherosclerotic plaque and multiphasic flow.  ·	SFA: The proximal right superficial femoral artery demonstrates severe, calcified atherosclerotic plaque and monophasic flow. The mid right superficial femoral artery demonstrates severe, calcified atherosclerotic plaque and monophasic flow. The distal right superficial femoral artery demonstrates severe, calcified atherosclerotic plaque and monophasic flow.  ·	POP: The distal right popliteal artery demonstrates severe, calcified atherosclerotic plaque and monophasic flow.  ·	DYLAN: The right anterior tibial artery demonstrates moderate, calcified atherosclerotic plaque and monophasic flow.  ·	TPT: The right tibial-peroneal trunk demonstrates mild-moderate, heterogeneous atherosclerotic plaque and monophasic flow.  ·	PTA: The right posterior tibial artery demonstrates moderate, heterogeneous atherosclerotic plaque and monophasic flow.  Left Lower Extremity Arteries:  ·	CFA: The left common femoral artery demonstrates moderate, heterogeneous atherosclerotic plaque and multiphasic flow.  ·	DFA: The left deep femoral artery demonstrates mild-moderate, smooth and heterogeneous atherosclerotic plaque and multiphasic flow.  ·	SFA: The proximal left superficial femoral artery demonstrates moderate, calcified atherosclerotic plaque and multiphasic flow. The mid left superficial femoral artery demonstrates severe, calcified atherosclerotic plaque and monophasic flow. The distal left superficial femoral artery demonstrates severe, calcified atherosclerotic plaque and monophasic flow.  ·	POP: The distal left popliteal artery demonstrates severe, calcified atherosclerotic plaque and monophasic flow.  ·	DYLAN: The left anterior tibial artery demonstrates moderate, calcified atherosclerotic plaque and monophasic flow.  ·	TPT: The left tibial-peroneal trunk demonstrates moderate, heterogeneous atherosclerotic plaque and monophasic flow.  ·	PTA: The left posterior tibial artery demonstrates moderate, calcified atherosclerotic plaque and monophasic flow.  ·	VANITA: The left peroneal artery demonstrates moderate, calcified atherosclerotic plaque and monophasic flow.         CTA/MRA: N/A    TRANSTHORACIC ECHOCARDIOGRAM: 10/6/2023  ·	Left Ventricle: The left ventricular cavity is normally sized. Left ventricular wall thickness is normal. Left ventricular systolic function is normal with an ejection fraction visually estimated at 60 to 65%. There is normal left ventricular diastolic function. Frequent ectopy noted.  ·	Right Ventricle: The right ventricular cavity is normal in size and normal systolic function.  ·	Left Atrium: The left atrium is mildly dilated in size with an indexed volume of 38.14 ml/m².  ·	Right Atrium: The right atrium is normal in size.  ·	Interatrial Septum: The interatrial septum appears intact.  ·	Aortic Valve: The aortic valve appears trileaflet with normal systolic excursion. There is mild calcification of the aortic valve leaflets. There is no aortic valve stenosis. There is no evidence of aortic regurgitation.  ·	Mitral Valve: Structurally normal mitral valve with normal leaflet excursion. There is mitral valve thickening of the anterior and posterior leaflets. There is mild calcification of the mitral valve annulus. There is mild mitral regurgitation.  ·	Tricuspid Valve: Structurally normal tricuspid valve with normal leaflet excursion. There is no evidence of tricuspid stenosis. There is mild tricuspid regurgitation ,due to right heart device lead. Estimated pulmonary artery systolic pressure is 32 mmHg.  ·	Pulmonic Valve: Structurally normal pulmonic valve with normal leaflet excursion. There is no pulmonic valve stenosis. There is trace pulmonic regurgitation.  ·	Pericardium: No pericardial effusion seen.  ·	Systemic Veins: The inferior vena cava is normal in size measuring 1.76 cm in diameter, (normal <2.1cm) with normal inspiratory collapse (normal >50%) consistent with normal right atrial pressure (, range 0-5mmHg).    Medical Management:       Antiplatelets: Plavix 75 mg daily, aspirin 81 mg daily       Cilostazol: N/A       Statin: Crestor 40 mg daily    Social History:   ·	Marital: , lives with   ·	Tobacco: Current 1 ppd smoker for 30 years.  ·	ETOH: Denies  ·	Drugs: Denies  ·	Caffeine: 2 cups coffee daily  ·	Occupation: Retired [employer]    ROS:   General: No fevers/chills, no fatigue  HEENT: No visual disturbances, no hearing loss, no headaches, no epistaxis.  CV: No chest pain, no KLELY, no palpitations, no edema, no orthopnea, no PND. + claudication  Respiratory: No dyspnea, no wheeze, no cough.  GI: no nausea/vomiting, no black/bloody stools.  : No hematuria  Musculoskeletal: No myalgias, no arthralgias, no back pain.  Neurologic: No weakness, no hemiparesis, no paresthesias, no seizures, no syncope/near syncope.    T(C): 36.5 (24 @ 08:16), Max: 36.5 (24 @ 08:16)  HR: 69 (24 @ 08:16)  BP: 142/75 (24 @ 08:16)  RR: 17 (24 @ 08:16)  SpO2: 99% (24 @ 08:16)    Physical Examination:   General: Awake, alert, speech clear, no acute distress.  HEENT: PERRL, EOMI.  Neck: No elevated JVP, no bruit, trachea midline.  Chest: CTA B/L, S1, S2, no murmur, RRR.  Abdomen: Soft, nontender, nondistended, normal bowel sounds.  Extremities: Radial: Right: 2+, Left: 2+, DP: Right: + by doppler, Left: UTO, PT: Right: + by doppler, Left: + by doppler  Neurologic: A&OX3, CN 2-12 grossly intact.     (2024 12:59)      PAST MEDICAL & SURGICAL HISTORY:  HTN (hypertension)  x 15 years, controlled      Depression      Graves disease      Hyperlipidemia      S/P  section      S/P tubal ligation          FAMILY HISTORY:      SOCIAL HISTORY:  Smoking:   Substance Use:   EtOH Use:   Advance Directives:     MEDICATIONS  (STANDING):  acetaminophen   IVPB .. 1000 milliGRAM(s) IV Intermittent once  aspirin  chewable 81 milliGRAM(s) Oral daily  chlorhexidine 4% Liquid 1 Application(s) Topical Once  clopidogrel Tablet 75 milliGRAM(s) Oral daily  influenza  Vaccine (HIGH DOSE) 0.5 milliLiter(s) IntraMuscular once  iron sucrose IVPB 100 milliGRAM(s) IV Intermittent every 24 hours  levothyroxine 112 MICROGram(s) Oral daily  nicotine -  14 mG/24Hr(s) Patch 1 Patch Transdermal daily  pantoprazole  Injectable 40 milliGRAM(s) IV Push two times a day  rosuvastatin 40 milliGRAM(s) Oral at bedtime  sertraline 150 milliGRAM(s) Oral daily  sodium chloride 0.9%. 1000 milliLiter(s) (50 mL/Hr) IV Continuous <Continuous>    MEDICATIONS  (PRN):  nicotine  Polacrilex Lozenge 4 milliGRAM(s) Oral every 3 hours PRN craving      Allergies    No Known Allergies    Intolerances    Naprosyn (Other)  aspirin (Other)      REVIEW OF SYSTEMS:  CONSTITUTIONAL: No weakness, fevers or chills  EYES/ENT: No visual changes;  No vertigo or throat pain   NECK: No pain or stiffness  RESPIRATORY: No cough, wheezing, hemoptysis; No shortness of breath  CARDIOVASCULAR: No chest pain or palpitations  GASTROINTESTINAL: No abdominal or epigastric pain. No nausea, vomiting, or hematemesis; No diarrhea or constipation. No melena or hematochezia.  GENITOURINARY: No dysuria, frequency or hematuria  NEUROLOGICAL: No numbness or weakness  SKIN: No itching, rashes  [ ] All other systems negative  [ ] Unable to assess ROS because ________    OBJECTIVE:  ICU Vital Signs Last 24 Hrs  T(C): 36.6 (15 Nov 2024 10:32), Max: 36.8 (15 Nov 2024 05:30)  T(F): 97.9 (15 Nov 2024 10:32), Max: 98.2 (15 Nov 2024 05:30)  HR: 69 (15 Nov 2024 10:32) (58 - 92)  BP: 103/61 (15 Nov 2024 11:12) (86/40 - 161/76)  BP(mean): 71 (15 Nov 2024 05:30) (71 - 84)  ABP: --  ABP(mean): --  RR: 18 (15 Nov 2024 10:32) (15 - 18)  SpO2: 98% (15 Nov 2024 10:32) (91% - 100%)    O2 Parameters below as of 15 Nov 2024 10:32  Patient On (Oxygen Delivery Method): room air               @ 07:01  -  15 @ 07:00  --------------------------------------------------------  IN: 1040 mL / OUT: 300 mL / NET: 740 mL      CAPILLARY BLOOD GLUCOSE    PHYSICAL EXAM:  GENERAL: NAD, lying in bed comfortably  HEAD:  Atraumatic, normocephalic  EYES: EOMI, PERRLA, conjunctiva and sclera clear  ENT: Moist mucous membranes  NECK: Supple, no JVD  HEART: S1, S2, regular rate and rhythm, no murmurs, rubs, or gallops  LUNGS: Unlabored respirations.  Clear to auscultation bilaterally, no crackles, wheezing, or rhonchi  ABDOMEN: Soft, nontender, nondistended, +BS  EXTREMITIES: 2+ peripheral pulses bilaterally. No clubbing, cyanosis, or edema  NERVOUS SYSTEM:  A&Ox3, no focal deficits   SKIN: No rashes or lesions  LINES:     LABS:                        5.9    13.70 )-----------( 260      ( 15 Nov 2024 05:35 )             18.4     Hgb Trend: 5.9<--, 5.8<--  15    136  |  104  |  18.7  ----------------------------<  112[H]  4.3   |  17.0[L]  |  0.87    Ca    7.9[L]      15 Nov 2024 05:35  Mg     1.6     11-15      Creatinine Trend: 0.87<--, 0.91<--    Urinalysis Basic - ( 15 Nov 2024 05:35 )    Color: x / Appearance: x / SG: x / pH: x  Gluc: 112 mg/dL / Ketone: x  / Bili: x / Urobili: x   Blood: x / Protein: x / Nitrite: x   Leuk Esterase: x / RBC: x / WBC x   Sq Epi: x / Non Sq Epi: x / Bacteria: x            MICROBIOLOGY:     RADIOLOGY & ADDITIONAL TESTS:

## 2024-11-15 NOTE — H&P ADULT - NSHPPHYSICALEXAM_GEN_ALL_CORE
CONSTITUTIONAL: NAD  CARDIAC: Regular rate, regular rhythm.  +S1, S2.  RESPIRATORY: Clear to auscultation bilaterally  GASTROINTESTINAL: Abdomen soft, non-tender, no guarding; normoactive bowel sounds   NEUROLOGICAL: Alert and oriented, grossly non-focal   SKIN: warm, dry

## 2024-11-16 ENCOUNTER — TRANSCRIPTION ENCOUNTER (OUTPATIENT)
Age: 69
End: 2024-11-16

## 2024-11-16 LAB
ALBUMIN SERPL ELPH-MCNC: 3.3 G/DL — SIGNIFICANT CHANGE UP (ref 3.3–5.2)
ALP SERPL-CCNC: 53 U/L — SIGNIFICANT CHANGE UP (ref 40–120)
ALT FLD-CCNC: 7 U/L — SIGNIFICANT CHANGE UP
ANION GAP SERPL CALC-SCNC: 13 MMOL/L — SIGNIFICANT CHANGE UP (ref 5–17)
AST SERPL-CCNC: 23 U/L — SIGNIFICANT CHANGE UP
BASOPHILS # BLD AUTO: 0.02 K/UL — SIGNIFICANT CHANGE UP (ref 0–0.2)
BASOPHILS NFR BLD AUTO: 0.2 % — SIGNIFICANT CHANGE UP (ref 0–2)
BILIRUB DIRECT SERPL-MCNC: 0.1 MG/DL — SIGNIFICANT CHANGE UP (ref 0–0.3)
BILIRUB INDIRECT FLD-MCNC: 0.2 MG/DL — SIGNIFICANT CHANGE UP (ref 0.2–1)
BILIRUB SERPL-MCNC: 0.3 MG/DL — LOW (ref 0.4–2)
BUN SERPL-MCNC: 15.8 MG/DL — SIGNIFICANT CHANGE UP (ref 8–20)
CALCIUM SERPL-MCNC: 8.1 MG/DL — LOW (ref 8.4–10.5)
CHLORIDE SERPL-SCNC: 106 MMOL/L — SIGNIFICANT CHANGE UP (ref 96–108)
CO2 SERPL-SCNC: 20 MMOL/L — LOW (ref 22–29)
CREAT SERPL-MCNC: 0.65 MG/DL — SIGNIFICANT CHANGE UP (ref 0.5–1.3)
EGFR: 95 ML/MIN/1.73M2 — SIGNIFICANT CHANGE UP
EOSINOPHIL # BLD AUTO: 0.03 K/UL — SIGNIFICANT CHANGE UP (ref 0–0.5)
EOSINOPHIL NFR BLD AUTO: 0.3 % — SIGNIFICANT CHANGE UP (ref 0–6)
GLUCOSE SERPL-MCNC: 104 MG/DL — HIGH (ref 70–99)
HCT VFR BLD CALC: 22.5 % — LOW (ref 34.5–45)
HCT VFR BLD CALC: 23.1 % — LOW (ref 34.5–45)
HGB BLD-MCNC: 7.5 G/DL — LOW (ref 11.5–15.5)
HGB BLD-MCNC: 7.5 G/DL — LOW (ref 11.5–15.5)
IMM GRANULOCYTES NFR BLD AUTO: 0.4 % — SIGNIFICANT CHANGE UP (ref 0–0.9)
LYMPHOCYTES # BLD AUTO: 0.65 K/UL — LOW (ref 1–3.3)
LYMPHOCYTES # BLD AUTO: 7 % — LOW (ref 13–44)
MAGNESIUM SERPL-MCNC: 2.1 MG/DL — SIGNIFICANT CHANGE UP (ref 1.6–2.6)
MCHC RBC-ENTMCNC: 27.3 PG — SIGNIFICANT CHANGE UP (ref 27–34)
MCHC RBC-ENTMCNC: 27.4 PG — SIGNIFICANT CHANGE UP (ref 27–34)
MCHC RBC-ENTMCNC: 32.5 G/DL — SIGNIFICANT CHANGE UP (ref 32–36)
MCHC RBC-ENTMCNC: 33.3 G/DL — SIGNIFICANT CHANGE UP (ref 32–36)
MCV RBC AUTO: 81.8 FL — SIGNIFICANT CHANGE UP (ref 80–100)
MCV RBC AUTO: 84.3 FL — SIGNIFICANT CHANGE UP (ref 80–100)
MONOCYTES # BLD AUTO: 1.32 K/UL — HIGH (ref 0–0.9)
MONOCYTES NFR BLD AUTO: 14.2 % — HIGH (ref 2–14)
NEUTROPHILS # BLD AUTO: 7.22 K/UL — SIGNIFICANT CHANGE UP (ref 1.8–7.4)
NEUTROPHILS NFR BLD AUTO: 77.9 % — HIGH (ref 43–77)
OB PNL STL: POSITIVE
PHOSPHATE SERPL-MCNC: 2.8 MG/DL — SIGNIFICANT CHANGE UP (ref 2.4–4.7)
PLATELET # BLD AUTO: 213 K/UL — SIGNIFICANT CHANGE UP (ref 150–400)
PLATELET # BLD AUTO: 215 K/UL — SIGNIFICANT CHANGE UP (ref 150–400)
POTASSIUM SERPL-MCNC: 4 MMOL/L — SIGNIFICANT CHANGE UP (ref 3.5–5.3)
POTASSIUM SERPL-SCNC: 4 MMOL/L — SIGNIFICANT CHANGE UP (ref 3.5–5.3)
PROT SERPL-MCNC: 5.5 G/DL — LOW (ref 6.6–8.7)
RBC # BLD: 2.74 M/UL — LOW (ref 3.8–5.2)
RBC # BLD: 2.75 M/UL — LOW (ref 3.8–5.2)
RBC # FLD: 15.3 % — HIGH (ref 10.3–14.5)
RBC # FLD: 15.3 % — HIGH (ref 10.3–14.5)
SODIUM SERPL-SCNC: 139 MMOL/L — SIGNIFICANT CHANGE UP (ref 135–145)
WBC # BLD: 10.26 K/UL — SIGNIFICANT CHANGE UP (ref 3.8–10.5)
WBC # BLD: 9.28 K/UL — SIGNIFICANT CHANGE UP (ref 3.8–10.5)
WBC # FLD AUTO: 10.26 K/UL — SIGNIFICANT CHANGE UP (ref 3.8–10.5)
WBC # FLD AUTO: 9.28 K/UL — SIGNIFICANT CHANGE UP (ref 3.8–10.5)

## 2024-11-16 PROCEDURE — 99232 SBSQ HOSP IP/OBS MODERATE 35: CPT

## 2024-11-16 PROCEDURE — 99223 1ST HOSP IP/OBS HIGH 75: CPT

## 2024-11-16 RX ADMIN — PANTOPRAZOLE SODIUM 40 MILLIGRAM(S): 40 TABLET, DELAYED RELEASE ORAL at 05:42

## 2024-11-16 RX ADMIN — Medication 112 MICROGRAM(S): at 05:42

## 2024-11-16 RX ADMIN — CLOPIDOGREL 75 MILLIGRAM(S): 75 TABLET, FILM COATED ORAL at 12:24

## 2024-11-16 RX ADMIN — NICOTINE 1 PATCH: 21 PATCH, EXTENDED RELEASE TRANSDERMAL at 12:23

## 2024-11-16 RX ADMIN — Medication 81 MILLIGRAM(S): at 12:23

## 2024-11-16 RX ADMIN — IRON SUCROSE 210 MILLIGRAM(S): 20 INJECTION, SOLUTION INTRAVENOUS at 17:33

## 2024-11-16 RX ADMIN — PANTOPRAZOLE SODIUM 40 MILLIGRAM(S): 40 TABLET, DELAYED RELEASE ORAL at 17:33

## 2024-11-16 RX ADMIN — SERTRALINE HYDROCHLORIDE 150 MILLIGRAM(S): 100 TABLET, FILM COATED ORAL at 12:25

## 2024-11-16 RX ADMIN — ROSUVASTATIN CALCIUM 40 MILLIGRAM(S): 5 TABLET, FILM COATED ORAL at 21:40

## 2024-11-16 NOTE — PROGRESS NOTE ADULT - SUBJECTIVE AND OBJECTIVE BOX
[facility] Division of Hospital Medicine    SUBJECTIVE / OVERNIGHT EVENTS:    Pt seen and examined at bedside. Pt has no complaints. States feels well. Denies CP, SOB, N/V/D, abd pain, fever, chills. Rest of ROS (-).     MEDICATIONS  (STANDING):  aspirin  chewable 81 milliGRAM(s) Oral daily  chlorhexidine 4% Liquid 1 Application(s) Topical Once  clopidogrel Tablet 75 milliGRAM(s) Oral daily  influenza  Vaccine (HIGH DOSE) 0.5 milliLiter(s) IntraMuscular once  iron sucrose IVPB 100 milliGRAM(s) IV Intermittent every 24 hours  levothyroxine 112 MICROGram(s) Oral daily  nicotine -  14 mG/24Hr(s) Patch 1 Patch Transdermal daily  pantoprazole  Injectable 40 milliGRAM(s) IV Push two times a day  rosuvastatin 40 milliGRAM(s) Oral at bedtime  sertraline 150 milliGRAM(s) Oral daily  sodium chloride 0.9%. 1000 milliLiter(s) (50 mL/Hr) IV Continuous <Continuous>    MEDICATIONS  (PRN):  aluminum hydroxide/magnesium hydroxide/simethicone Suspension 30 milliLiter(s) Oral once PRN Dyspepsia  nicotine  Polacrilex Lozenge 4 milliGRAM(s) Oral every 3 hours PRN craving        I&O's Summary    15 Nov 2024 07:01  -  16 Nov 2024 07:00  --------------------------------------------------------  IN: 1460 mL / OUT: 0 mL / NET: 1460 mL        PHYSICAL EXAM:  Vital Signs Last 24 Hrs  T(C): 37.1 (16 Nov 2024 07:54), Max: 37.3 (15 Nov 2024 15:59)  T(F): 98.8 (16 Nov 2024 07:54), Max: 99.1 (15 Nov 2024 15:59)  HR: 76 (16 Nov 2024 12:00) (69 - 84)  BP: 121/79 (16 Nov 2024 12:00) (88/73 - 145/97)  BP(mean): 79 (16 Nov 2024 06:00) (74 - 819)  RR: 17 (16 Nov 2024 12:00) (16 - 18)  SpO2: 100% (16 Nov 2024 12:00) (94% - 100%)    Parameters below as of 16 Nov 2024 12:00  Patient On (Oxygen Delivery Method): room air            General: Age-appearing, in no acute distress  Head: Normocephalic, atraumatic  ENMT: EOMI, no scleral icterus, neck supple, no JVD  Cardiovascular: +S1, S2; Regular rate and rhythm, no murmurs.  Respiratory: CTAB, no wheezing, no rales, no rhonchi  Gastrointestinal: soft, ND, NT, (+) BS, (-) rebound  Neuro: AAOx3, CN2-12 intact, no FND  Musculoskeletal: Normal tone, no deformities  Skin: Warm, dry, no rash, no jaundice  Psych: Calm, cooperative     LABS:                        7.5    9.28  )-----------( 213      ( 16 Nov 2024 06:58 )             23.1     11-16    139  |  106  |  15.8  ----------------------------<  104[H]  4.0   |  20.0[L]  |  0.65    Ca    8.1[L]      16 Nov 2024 06:58  Phos  2.8     11-16  Mg     2.1     11-16    TPro  5.5[L]  /  Alb  3.3  /  TBili  0.3[L]  /  DBili  0.1  /  AST  23  /  ALT  7   /  AlkPhos  53  11-16      CARDIAC MARKERS ( 15 Nov 2024 14:24 )  x     / x     / x     / x     / 5.8 ng/mL      Urinalysis Basic - ( 16 Nov 2024 06:58 )    Color: x / Appearance: x / SG: x / pH: x  Gluc: 104 mg/dL / Ketone: x  / Bili: x / Urobili: x   Blood: x / Protein: x / Nitrite: x   Leuk Esterase: x / RBC: x / WBC x   Sq Epi: x / Non Sq Epi: x / Bacteria: x        CAPILLARY BLOOD GLUCOSE            RADIOLOGY & ADDITIONAL TESTS:  Results Reviewed:   Imaging Personally Reviewed:  Electrocardiogram Personally Reviewed:                                           [facility] Division of Hospital Medicine    SUBJECTIVE / OVERNIGHT EVENTS:    Pt seen and examined at bedside. Pt has no complaints. States feels well. Denies CP, SOB, N/V/D, abd pain, fever, chills, melena, hematochezia. Rest of ROS (-).     MEDICATIONS  (STANDING):  aspirin  chewable 81 milliGRAM(s) Oral daily  chlorhexidine 4% Liquid 1 Application(s) Topical Once  clopidogrel Tablet 75 milliGRAM(s) Oral daily  influenza  Vaccine (HIGH DOSE) 0.5 milliLiter(s) IntraMuscular once  iron sucrose IVPB 100 milliGRAM(s) IV Intermittent every 24 hours  levothyroxine 112 MICROGram(s) Oral daily  nicotine -  14 mG/24Hr(s) Patch 1 Patch Transdermal daily  pantoprazole  Injectable 40 milliGRAM(s) IV Push two times a day  rosuvastatin 40 milliGRAM(s) Oral at bedtime  sertraline 150 milliGRAM(s) Oral daily  sodium chloride 0.9%. 1000 milliLiter(s) (50 mL/Hr) IV Continuous <Continuous>    MEDICATIONS  (PRN):  aluminum hydroxide/magnesium hydroxide/simethicone Suspension 30 milliLiter(s) Oral once PRN Dyspepsia  nicotine  Polacrilex Lozenge 4 milliGRAM(s) Oral every 3 hours PRN craving        I&O's Summary    15 Nov 2024 07:01  -  16 Nov 2024 07:00  --------------------------------------------------------  IN: 1460 mL / OUT: 0 mL / NET: 1460 mL        PHYSICAL EXAM:  Vital Signs Last 24 Hrs  T(C): 37.1 (16 Nov 2024 07:54), Max: 37.3 (15 Nov 2024 15:59)  T(F): 98.8 (16 Nov 2024 07:54), Max: 99.1 (15 Nov 2024 15:59)  HR: 76 (16 Nov 2024 12:00) (69 - 84)  BP: 121/79 (16 Nov 2024 12:00) (88/73 - 145/97)  BP(mean): 79 (16 Nov 2024 06:00) (74 - 819)  RR: 17 (16 Nov 2024 12:00) (16 - 18)  SpO2: 100% (16 Nov 2024 12:00) (94% - 100%)    Parameters below as of 16 Nov 2024 12:00  Patient On (Oxygen Delivery Method): room air            General: Age-appearing, in no acute distress  Head: Normocephalic, atraumatic  ENMT: EOMI, no scleral icterus, neck supple, no JVD  Cardiovascular: +S1, S2; Regular rate and rhythm, no murmurs.  Respiratory: CTAB, no wheezing, no rales, no rhonchi  Gastrointestinal: soft, ND, NT, (+) BS, (-) rebound  Neuro: AAOx3, CN2-12 intact, no FND  Musculoskeletal: Normal tone, no deformities  Skin: Warm, dry, no rash, no jaundice  Psych: Calm, cooperative     LABS:                        7.5    9.28  )-----------( 213      ( 16 Nov 2024 06:58 )             23.1     11-16    139  |  106  |  15.8  ----------------------------<  104[H]  4.0   |  20.0[L]  |  0.65    Ca    8.1[L]      16 Nov 2024 06:58  Phos  2.8     11-16  Mg     2.1     11-16    TPro  5.5[L]  /  Alb  3.3  /  TBili  0.3[L]  /  DBili  0.1  /  AST  23  /  ALT  7   /  AlkPhos  53  11-16      CARDIAC MARKERS ( 15 Nov 2024 14:24 )  x     / x     / x     / x     / 5.8 ng/mL      Urinalysis Basic - ( 16 Nov 2024 06:58 )    Color: x / Appearance: x / SG: x / pH: x  Gluc: 104 mg/dL / Ketone: x  / Bili: x / Urobili: x   Blood: x / Protein: x / Nitrite: x   Leuk Esterase: x / RBC: x / WBC x   Sq Epi: x / Non Sq Epi: x / Bacteria: x        CAPILLARY BLOOD GLUCOSE            RADIOLOGY & ADDITIONAL TESTS:  Results Reviewed:   Imaging Personally Reviewed:  Electrocardiogram Personally Reviewed:

## 2024-11-16 NOTE — CONSULT NOTE ADULT - SUBJECTIVE AND OBJECTIVE BOX
HISTORY OF PRESENT ILLNESS: 69-year-old female with a history of iron deficiency anemia on IV iron therapy, history of AVMs, coronary artery disease, peripheral vascular disease who was admitted after peripheral angiogram and angioplasty with stent placement.  Postop course was complicated by in-stent thrombosis requiring an emergent return to the OR with thrombectomy and balloon angioplasty.  Post procedurally, she was noted to have hemoglobin of 5.9.  Hemoglobin  was 11.6.  MCV is normal at 82.9.  She denies overt GI bleeding including melena, bright red blood per rectum, nausea, vomiting, abdominal pain, diarrhea or change in bowel habits.  She denies NSAID use.    She has a longstanding history of iron deficiency anemia she follows with Dr. Jennings and Dr. Briggs in [place].  She receives frequent IV iron infusions.  She has undergone endoscopic workup for this issue in the past.  Most recently, in October 10, 2024 she had an enteroscopy.  A single AVM was seen in the fourth part of the duodenum this was ablated.  In 2024 she underwent EGD colonoscopy EGD demonstrated a hiatal hernia the stomach was notable for small AVMs that were ablated and her colon demonstrated hemorrhoids.  She has also undergone a negative capsule endoscopy.        PAST MEDICAL/SURGICAL HISTORY:  HTN (hypertension)  x 15 years, controlled    Depression    Graves disease    Hyperlipidemia    S/P  section    S/P tubal ligation      SOCIAL HISTORY:  - TOBACCO: Denies  - ALCOHOL: Denies  - ILLICIT DRUG USE: Denies    FAMILY HISTORY:  No known history of gastrointestinal or liver disease;      HOME MEDICATIONS:  aspirin 81 mg oral tablet: orally once a day (2024 18:39)  Crestor 40 mg oral tablet: 1 tab(s) orally once a day (at bedtime) (2024 08:16)  losartan 50 mg oral tablet: 1 tab(s) orally (2024 08:16)  Norvasc 10 mg oral tablet: 1 tab(s) orally once a day (2024 08:16)  Plavix 75 mg oral tablet: 1 tab(s) orally once a day (2024 18:39)  Synthroid 112 mcg (0.112 mg) oral tablet: 1 tab(s) orally once a day (2024 18:39)  Zoloft 100 mg oral tablet: 1.5 tab(s) orally once a day (15 Nov 2024 14:56)    INPATIENT MEDICATIONS:  MEDICATIONS  (STANDING):  aspirin  chewable 81 milliGRAM(s) Oral daily  chlorhexidine 4% Liquid 1 Application(s) Topical Once  clopidogrel Tablet 75 milliGRAM(s) Oral daily  influenza  Vaccine (HIGH DOSE) 0.5 milliLiter(s) IntraMuscular once  iron sucrose IVPB 100 milliGRAM(s) IV Intermittent every 24 hours  levothyroxine 112 MICROGram(s) Oral daily  nicotine -  14 mG/24Hr(s) Patch 1 Patch Transdermal daily  pantoprazole  Injectable 40 milliGRAM(s) IV Push two times a day  rosuvastatin 40 milliGRAM(s) Oral at bedtime  sertraline 150 milliGRAM(s) Oral daily  sodium chloride 0.9%. 1000 milliLiter(s) (50 mL/Hr) IV Continuous <Continuous>    MEDICATIONS  (PRN):  aluminum hydroxide/magnesium hydroxide/simethicone Suspension 30 milliLiter(s) Oral once PRN Dyspepsia  nicotine  Polacrilex Lozenge 4 milliGRAM(s) Oral every 3 hours PRN craving    ALLERGIES:  No Known Allergies    T(C): 37.1 (24 @ 07:54), Max: 37.3 (11-15-24 @ 15:59)  HR: 82 (24 @ 14:00) (70 - 84)  BP: 138/68 (24 @ 14:00) (88/73 - 145/97)  RR: 18 (24 @ 14:00) (16 - 18)  SpO2: 99% (24 @ 14:00) (94% - 100%)    11-15-24 @ 07:01  -  24 @ 07:00  --------------------------------------------------------  IN: 1460 mL / OUT: 0 mL / NET: 1460 mL    24 @ 07:01  -  24 @ 15:55  --------------------------------------------------------  IN: 300 mL / OUT: 1 mL / NET: 299 mL        PHYSICAL EXAM:  Constitutional:  in no apparent distress  Eyes: Sclerae anicteric, conjunctivae normal  ENMT: Mucus membranes moist, no oropharyngeal thrush noted  Respiratory: Breathing nonlabored; clear to auscultation  Cardiovascular: Regular rate   Gastrointestinal: Soft, nontender, nondistended, normoactive bowel sounds;  no rebound tenderness or involuntary guarding  Extremities: No edema  Neurological: Alert and oriented to person, place and time;   Skin: No jaundice  Musculoskeletal: No significant peripheral atrophy  Psychiatric: Affect and mood appropriate      LABS:             7.5    10.26 )-----------( 215      (  @ 14:10 )             22.5                7.5    9.28  )-----------( 213      (  @ 06:58 )             23.1                7.3    11.58 )-----------( 204      ( 11-15 @ 18:30 )             21.8                5.9    13.70 )-----------( 260      ( 11-15 @ 05:35 )             18.4                5.8    13.71 )-----------( 269      ( 11-15 @ 04:36 )             18.2             139  |  106  |  15.8  ----------------------------<  104[H]  4.0   |  20.0[L]  |  0.65    Ca    8.1[L]      2024 06:58  Phos  2.8       Mg     2.1         TPro  5.5[L]  /  Alb  3.3  /  TBili  0.3[L]  /  DBili  0.1  /  AST  23  /  ALT  7   /  AlkPhos  53      LIVER FUNCTIONS - ( 2024 06:58 )  Alb: 3.3 g/dL / Pro: 5.5 g/dL / ALK PHOS: 53 U/L / ALT: 7 U/L / AST: 23 U/L / GGT: x               Urinalysis Basic - ( 2024 06:58 )    Color: x / Appearance: x / SG: x / pH: x  Gluc: 104 mg/dL / Ketone: x  / Bili: x / Urobili: x   Blood: x / Protein: x / Nitrite: x   Leuk Esterase: x / RBC: x / WBC x   Sq Epi: x / Non Sq Epi: x / Bacteria: x    There were no apparent limitations or complications  Findings:  Esophagus Lumen A medium size hiatal hernia was seen, the esophagogastric  junction(EGJ) was noted at 32 cm, with hiatal narrowing at 36 cm from the  incisors. No esophagitis was noted. Retroflexion view in the stomach confirmed  the size and morphology of the hernia.  Stomach Mucosa Normal mucosa was noted in the whole stomach.  Duodenum Flat lesions A single medium bleeding angioectasia was seen in the  fourth part of the duodenum. The angioectasia(s) was ablated completely.Bi-cap  electrocautery was successfully applied for hemostasis. APC thermal therapy and  Hemostatic clip x 1 applied, successful  Impressions:  Esophageal hiatal hernia.  Normal mucosa in the whole stomach.  Angioectasia in the fourth part of the duodenum. (Thermal Therapy, Additional  Intervention).      EGD Findings:  Esophagus Lumen A small size hiatal hernia was seen, the esophagogastric  junction(EGJ) was noted at 32 cm, with hiatal narrowing at 34 cm from the  incisors. No esophagitis was noted. Retroflexion view in the stomach confirmed  the size and morphology of the hernia.  Stomach Flat lesions A few medium bleeding angioectasias were seen in the antrum  and pre-pyloric region. The angioectasia(s) was ablated completely.Bi-cap  electrocautery was successfully applied for hemostasis. Hemostasis was  performed.  Duodenum Mucosa Normal mucosa was noted in the whole examined duodenum.  EGD Impressions:  Esophageal hiatal hernia.  Angioectasias in the antrum and pre-pyloric region. (Thermal Therapy,  Hemostasis).  Normal mucosa in the whole examined duodenum.  EGD Limitations/Complications:  There were no apparent limitations or complications  Colonoscopy  Colonoscopy Procedure:  This is a  average risk patient  undergoing diagnostic colonoscopy. Prior  colonoscopy was performed over ten years ago.  The quality of preparation was  6-9 on the BBP scale/adequate. Patient was placed in left lateral decubitus  position. The colonoscope was introduced through rectum and advanced under  direct visualization until cecum reached. The appendiceal orifice and the  ileocecal valve were identified. The terminal ileum was identified. The  colonoscope was retroflexed within the rectum. Careful visualization was  performed as the instrument was withdrawn. Patient tolerance to the procedure  was excellent. The procedure was not difficult. Digital exam was normal.  [place] Bowel Preparation Score:  Using the [place] Bowel Preparation Score, each segment of the colon was graded  as follows:  Left Colon: Good, 2 | Transverse Colon: Good, 2  | Right Colon: Good, 2  Colonoscopy Findings:  Protruding lesions Medium internal and external hemorrhoids and stigmata of  recent bleeding were noted.  Additional findings The colon was otherwise unremarkable.  Colonoscopy Impressions:  Internal and external hemorrhoids.  The colon was otherwise unremarkable.  Colonoscopy Limitations/Complications:  There were no apparent limitations or complications  Plan:  Advance diet as tolerated  Discharge to home  Patient will call office if any worrisome complaints, anticipatory guidance  discussed  BROOKLYN BRIGGS MD  Version 1, Electronically signed on 2024 3:37:11 PM by BROOKLYN BRIGGS MD      IMAGING: I personally reviewed the CTAP, and I agree with the radiologist's interpretation as described below: no active bleeding

## 2024-11-16 NOTE — DISCHARGE NOTE PROVIDER - CARE PROVIDERS DIRECT ADDRESSES
,[email or web address] ,[email or web address] UnitD[email or web address]¢ Network.[facility],[email or web address] Unit[email or web address]¢ Network.[facility]

## 2024-11-16 NOTE — DISCHARGE NOTE PROVIDER - NSDCCPCAREPLAN_GEN_ALL_CORE_FT
PRINCIPAL DISCHARGE DIAGNOSIS  Diagnosis: PAD (peripheral artery disease)  Assessment and Plan of Treatment: Peripheral artery disease is the buildup of plaque in the arteries that supply oxygen-rich blood to your body including your lower exteremties. Plaque causes a narrowing or blockage that could result in reduced blood flow to your body. Symptoms include cramping, burning, sharp pain or discomfort, numbness, tingling, pale or cool extremities,  that may occur with ambulation and could progress to resting pain. Sometimes these blockages require interventions such as balloooning or placement of a stent to open the blockage and restore blood flow. Dr. Agarwal placed a stent to your left iliac artery, left SFA artery and left popliteal artery. You developed a thrombus (clot) in your right femoral artery in which he removed the clot.   -PLEASE TAKE YOUR ASPIRIN AND PLAVIX EVERY DAY. THESE MEDICATIONS WILL PREVENT THE STENT FROM SHUTTING DOWN OR FROM BLOOD CLOT FROM FORMING INSIDE STENT.   Managing risk factors will help keep your circulation open and prevent future blockages, risk factors may include: high blood pressure, high cholesterol, obesity, sedentary life style and smoking.    Your diet should be low in fat, cholesterol, salt and carbohydrates, increase fruits (caution if diabetic), vegetables and whole grains/fiber rich foods.   Take all your cardiac  medications as prescribed.    Exercise is a very important factor in cardiovascular health. Once your post procedure restrictions have passed, you should engage in heart healthy, aerobic exercise. Be sure to have clearance.    Follow up with your vascular doctor within 1-2 weeks after your procedure.   Call your physician or our unit ([phone]) with any questions or concerns that may arise.     PRINCIPAL DISCHARGE DIAGNOSIS  Diagnosis: PAD (peripheral artery disease)  Assessment and Plan of Treatment: Peripheral artery disease is the buildup of plaque in the arteries that supply oxygen-rich blood to your body including your lower exteremties. Plaque causes a narrowing or blockage that could result in reduced blood flow to your body. Symptoms include cramping, burning, sharp pain or discomfort, numbness, tingling, pale or cool extremities,  that may occur with ambulation and could progress to resting pain. Sometimes these blockages require interventions such as balloooning or placement of a stent to open the blockage and restore blood flow. Dr. Agrawal placed a stent to your left iliac artery, left SFA artery and left popliteal artery. You developed a thrombus (clot) in your right femoral artery in which he removed the clot.   -PLEASE TAKE YOUR ASPIRIN AND PLAVIX EVERY DAY. THESE MEDICATIONS WILL PREVENT THE STENT FROM SHUTTING DOWN OR FROM BLOOD CLOT FROM FORMING INSIDE STENT.   Managing risk factors will help keep your circulation open and prevent future blockages, risk factors may include: high blood pressure, high cholesterol, obesity, sedentary life style and smoking.    Your diet should be low in fat, cholesterol, salt and carbohydrates, increase fruits (caution if diabetic), vegetables and whole grains/fiber rich foods.   Take all your cardiac  medications as prescribed.    Exercise is a very important factor in cardiovascular health. Once your post procedure restrictions have passed, you should engage in heart healthy, aerobic exercise. Be sure to have clearance.    Follow up with your vascular doctor within 1-2 weeks after your procedure.   Call your physician or our unit ([phone]) with any questions or concerns that may arise.      SECONDARY DISCHARGE DIAGNOSES  Diagnosis: Anemia due to acute blood loss  Assessment and Plan of Treatment: Plan:  Please take iron supplements as prescribed  Please followup with GI outpatient  Please return to ED if you experience blood in stool or black colored stools     PRINCIPAL DISCHARGE DIAGNOSIS  Diagnosis: PAD (peripheral artery disease)  Assessment and Plan of Treatment: Peripheral artery disease is the buildup of plaque in the arteries that supply oxygen-rich blood to your body including your lower exteremties. Plaque causes a narrowing or blockage that could result in reduced blood flow to your body. Symptoms include cramping, burning, sharp pain or discomfort, numbness, tingling, pale or cool extremities,  that may occur with ambulation and could progress to resting pain. Sometimes these blockages require interventions such as balloooning or placement of a stent to open the blockage and restore blood flow. Dr. Agrawal placed a stent to your left iliac artery, left SFA artery and left popliteal artery. You developed a thrombus (clot) in your right femoral artery in which he removed the clot.   -PLEASE TAKE YOUR ASPIRIN AND PLAVIX EVERY DAY. THESE MEDICATIONS WILL PREVENT THE STENT FROM SHUTTING DOWN OR FROM BLOOD CLOT FROM FORMING INSIDE STENT.   Managing risk factors will help keep your circulation open and prevent future blockages, risk factors may include: high blood pressure, high cholesterol, obesity, sedentary life style and smoking.    Your diet should be low in fat, cholesterol, salt and carbohydrates, increase fruits (caution if diabetic), vegetables and whole grains/fiber rich foods.   Take all your cardiac  medications as prescribed.    Exercise is a very important factor in cardiovascular health. Once your post procedure restrictions have passed, you should engage in heart healthy, aerobic exercise. Be sure to have clearance.    Follow up with your vascular doctor within 1-2 weeks after your procedure.   Call your physician or our unit ([phone]) with any questions or concerns that may arise.      SECONDARY DISCHARGE DIAGNOSES  Diagnosis: Anemia due to acute blood loss  Assessment and Plan of Treatment: Plan:  Please take iron supplements as prescribed  Please followup with GI outpatient  Please return to ED if you experience blood in stool or black colored stools    Diagnosis: Tobacco dependence  Assessment and Plan of Treatment:     Diagnosis: Hypertension  Assessment and Plan of Treatment:      PRINCIPAL DISCHARGE DIAGNOSIS  Diagnosis: PAD (peripheral artery disease)  Assessment and Plan of Treatment: Peripheral artery disease is the buildup of plaque in the arteries that supply oxygen-rich blood to your body including your lower exteremties. Plaque causes a narrowing or blockage that could result in reduced blood flow to your body. Symptoms include cramping, burning, sharp pain or discomfort, numbness, tingling, pale or cool extremities,  that may occur with ambulation and could progress to resting pain. Sometimes these blockages require interventions such as balloooning or placement of a stent to open the blockage and restore blood flow. Dr. Agrawal placed a stent to your left iliac artery, left SFA artery and left popliteal artery. You developed a thrombus (clot) in your right femoral artery in which he removed the clot.   -PLEASE TAKE YOUR ASPIRIN AND PLAVIX EVERY DAY. THESE MEDICATIONS WILL PREVENT THE STENT FROM SHUTTING DOWN OR FROM BLOOD CLOT FROM FORMING INSIDE STENT.   Managing risk factors will help keep your circulation open and prevent future blockages, risk factors may include: high blood pressure, high cholesterol, obesity, sedentary life style and smoking.    Your diet should be low in fat, cholesterol, salt and carbohydrates, increase fruits (caution if diabetic), vegetables and whole grains/fiber rich foods.   Take all your cardiac  medications as prescribed.    Exercise is a very important factor in cardiovascular health. Once your post procedure restrictions have passed, you should engage in heart healthy, aerobic exercise. Be sure to have clearance.    Follow up with your vascular doctor within 1-2 weeks after your procedure.   Call your physician or our unit ([phone]) with any questions or concerns that may arise.      SECONDARY DISCHARGE DIAGNOSES  Diagnosis: Anemia due to acute blood loss  Assessment and Plan of Treatment: Plan:  Please take iron supplements as prescribed  Please followup with GI outpatient  Please return to ED if you experience blood in stool or black colored stools    Diagnosis: Tobacco dependence  Assessment and Plan of Treatment: quit smoking    Diagnosis: Hypertension  Assessment and Plan of Treatment: continue amldopine and losartan lower dose 25 mg daily

## 2024-11-16 NOTE — CONSULT NOTE ADULT - ASSESSMENT
69-year-old female with a history of iron deficiency anemia, AVMs, peripheral artery disease who presents status post vascular intervention with stent placement and thrombectomy    Iron deficiency anemia  This is chronic  Given her post procedural state, this degree of anemia is multifactorial   She does not have evidence of overt GI bleeding to explain her profound drop in hemoglobin.  Continue regular diet  Continue PPI once a day  Continue to avoid NSAIDs  Maintain HGB >8  Given her recent interventions continue antiplatelet/anticoagulation therapy per vascular surgery  Will plan for repeat EGD/enteroscopy on Monday to determine if she has additional AVMs that need to be ablated  Discussed with patient and  at bedside
HPI:  70y/o female smoker with history of coronary artery disease, PCI 2/2024 LAD, multilevel PAD, R iliac intervention 2/2024, COPD, HTN, HLD, carotid and aortic disease, prediabetes. She states that she get left lower leg numbness and pain at rest, and right lower leg cramping when walking a few blocks, which is improved since PTA of the right LAUREL stent.     Patient underwent Peripheral Angiography and angioplasty and stent of the left iliac, SFA, and popliteal. Vascular Access: RFA, S/P sheath pull overnight. Post cath in an hour, Patient c/o RLE numbness and pain, unable to move foot. Unable to obtain doppler DP or PT pulses. Sheath pulled emergently pulled with no improvement. Patient taken emergently back to the [facility] and angiography VIA LFA Access, revealed a right CFA thrombus. thrombectomy and balloon angioplasty performed.s/p left FA sheath pull overnight - Post cath Today am, HB/HCT dropped to 5.9/18.4, 2 units of PRBC transfusion ordered, pending to transfuse. MICU consulted for hypotension     # Symptomatic anemia/ Hypotension  -MICU consulted for concerning symptomatic acute anemia, with hypotension, dizziness, SOB, likely requiring pressor support. Upon assessment patient was asymptomatic and comfortable in bed, normotensive w/ warm LE no numbness or tingling & + DP pulses bilaterally   -PRBCs ordered to be transfused   - No acute limb ischemia concern now, CT A/P resulted with no RP hematoma  - If symptoms return or worsen rec repeat CT scan inc angio of b/l LE  -VSS q1 hourly for now, pending repeat labs  -Hold antihypertensives now   -Will follow peripherally   -Discussed with Dr. Miranda

## 2024-11-16 NOTE — DISCHARGE NOTE PROVIDER - NSDCCPTREATMENT_GEN_ALL_CORE_FT
PRINCIPAL PROCEDURE  Procedure: Angiogram, peripheral, with PTA if indicated  Findings and Treatment: -You had a peripheral angiogram with Dr. Wai Agrawal on 11/16/24. Dr. Agrawal accessed your right and left femoral artery during the procedure. That is the artery in your right and left groin.   -Please continue to monitor youryour groins when you go home. If you develop any bleeding, lay down where you are and apply direct firm pressure until the bleeding stops. If the bleeding is excessive, please call 911.   -If heavy bleeding or large lumps form, hold pressure at the spot and come to the Emergency Room.  -No submerging the leg in water for 48 hours. This includes hot tubs, swimming pools and jacuzzis.  You may start showering today. Shower with warm water and soap. Pat dry with towel. You may place a bandaid over the site. change the bandaid every day.   - No heavy lifting greater than 5 lbs x 5 days  -You may walk indoors/ outdoors as tolerated. No strenuous exercise, gym, sports or heavy lifting x5 days.  -Please return to nearest ED if you develop any fever, chills, drainage from the incision site, or any change of temperature, color, sensation of the affected extremity.  -Call your doctor for any bleeding, swelling, loss of sensation in the leg or foot, or toes turning blue.  -Please return to nearest ED if you develop any chest pain, chest pressure, shortness of breath, jaw pain, pain radiating down the arm, palpitations, dizziness, palpitations, abdominal complaints including abdominal pain, nausea and vomiting.   -Please follow up with your cardiologist in 1-2 weeks

## 2024-11-16 NOTE — DISCHARGE NOTE PROVIDER - NSDCFUSCHEDAPPT_GEN_ALL_CORE_FT
[facility]  Scheduled Appointment: 11/19/2024    [facility]  Scheduled Appointment: 11/27/2024    [facility]  Scheduled Appointment: 12/03/2024    Wai Agrawal  [facility]  CARDIOLOGY [address]  Scheduled Appointment: 12/06/2024    [facility]  Scheduled Appointment: 12/10/2024    [facility]  Scheduled Appointment: 12/30/2024    Terri Farnsworth  [facility]  Scheduled Appointment: 01/27/2025     [facility]  Scheduled Appointment: 11/27/2024    [facility]  Scheduled Appointment: 12/03/2024    Wai Agrawal  [facility]  CARDIOLOGY [address]  Scheduled Appointment: 12/06/2024    [facility]  Scheduled Appointment: 12/10/2024    [facility]  Scheduled Appointment: 12/30/2024    Terri Farnsworth  [facility]  Scheduled Appointment: 01/27/2025

## 2024-11-16 NOTE — DISCHARGE NOTE PROVIDER - NSDCMRMEDTOKEN_GEN_ALL_CORE_FT
aspirin 81 mg oral tablet: orally once a day  Crestor 40 mg oral tablet: 1 tab(s) orally once a day (at bedtime)  losartan 50 mg oral tablet: 1 tab(s) orally  Norvasc 10 mg oral tablet: 1 tab(s) orally once a day  Plavix 75 mg oral tablet: 1 tab(s) orally once a day  Synthroid 112 mcg (0.112 mg) oral tablet: 1 tab(s) orally once a day  Zoloft 100 mg oral tablet: 1.5 tab(s) orally once a day   aspirin 81 mg oral tablet: orally once a day  Crestor 40 mg oral tablet: 1 tab(s) orally once a day (at bedtime)  levothyroxine 112 mcg (0.112 mg) oral tablet: 1 tab(s) orally once a day  losartan 50 mg oral tablet: 1 tab(s) orally  Norvasc 10 mg oral tablet: 1 tab(s) orally once a day  Plavix 75 mg oral tablet: 1 tab(s) orally once a day  Synthroid 112 mcg (0.112 mg) oral tablet: 1 tab(s) orally once a day  Zoloft 100 mg oral tablet: 1.5 tab(s) orally once a day   aspirin 81 mg oral tablet: orally once a day  Crestor 40 mg oral tablet: 1 tab(s) orally once a day (at bedtime)  levothyroxine 112 mcg (0.112 mg) oral tablet: 1 tab(s) orally once a day  nicotine 14 mg/24 hr transdermal film, extended release: 1 patch transdermal once a day  Norvasc 10 mg oral tablet: 1 tab(s) orally once a day  pantoprazole 40 mg oral delayed release tablet: 1 tab(s) orally 2 times a day  Plavix 75 mg oral tablet: 1 tab(s) orally once a day  Zoloft 100 mg oral tablet: 1.5 tab(s) orally once a day   aspirin 81 mg oral tablet: orally once a day  Crestor 40 mg oral tablet: 1 tab(s) orally once a day (at bedtime)  levothyroxine 112 mcg (0.112 mg) oral tablet: 1 tab(s) orally once a day  losartan 25 mg oral tablet: 1 tab(s) orally once a day  nicotine 14 mg/24 hr transdermal film, extended release: 1 patch transdermal once a day  Norvasc 10 mg oral tablet: 1 tab(s) orally once a day (at bedtime)  pantoprazole 40 mg oral delayed release tablet: 1 tab(s) orally 2 times a day  Plavix 75 mg oral tablet: 1 tab(s) orally once a day  Zoloft 100 mg oral tablet: 1.5 tab(s) orally once a day

## 2024-11-16 NOTE — PROGRESS NOTE ADULT - SUBJECTIVE AND OBJECTIVE BOX
[facility]                                                         CARDIOLOGY AT [facility]                                                                  [address], [place] [address]                                                         Telephone: [phone]. Fax:[phone]                                                          INTERVENTIONAL CARDIOLOGY PROGRESS NOTE    HPI: 70y/o female smoker with history of coronary artery disease, PCI 2/2024 LAD, multilevel PAD, R iliac intervention 2/2024, COPD, HTN, HLD, carotid and aortic disease, prediabetes. She states that she get left lower leg numbness and pain at rest, and right lower leg cramping when walking a few blocks, which is improved since PTA of the right LAUREL stent.   BOLA on 09/24 revealed  BOLA of 0.40. Mildly abnormal ankle waveforms. No significant changes post exercise. very faint digit waveform TBI not obtainable.  Patient underwent Peripheral Angiography and angioplasty and stent of the left iliac, SFA, and popliteal via RFA. Post procedure, patient complain of RLE pain and numbness, and unable to move foot. No signal DP/PT pulses via RLE so RFA sheath was removed emergently and patient taken back to lab. Repeat peripheral angiogram via LFA revealed a right CFA thrombus in which thrombectomy and balloon angioplasty were performed. LFA sheath pulled with no bleeding or hematoma. Course c/b significant anemia h/h 5.9/18.4 and patient with complaints of dizziness and episodes of hypotension. 2 units prbc were transfused. CT abdomen/ plv performed No significant retroperitoneal hematomas. Trace infiltration/small hemorrhagic fluid in the left groin/sec left external iliac region. Repeat CBC this AM 7.5/23.1. Patient is tolerating ambulating around room this AM without complaints of dizziness, palpitations or lower extremity pain, she reports mild groin tenderness. She has also received IV venofer yesterday. She denies chest pain, chest pressure, shortness of breath, palpitations, dizziness, abdominal pain, back pain or b/l LE pain. b/l groin sites stable w/o active bleeding or hematoma. b/l LE acyanotic; warm to touch; with + DP/ PT signals.       Review of symptoms:   Cardiac:  No chest pain. No dyspnea. No palpitations.  Respiratory: no cough. No dyspnea  Gastrointestinal: No diarrhea. No abdominal pain. No bleeding.   Neuro: No focal neuro complaints.    Vitals:  T(C): 37.1 (11-16-24 @ 07:54), Max: 37.3 (11-15-24 @ 15:59)  HR: 74 (11-16-24 @ 10:00) (69 - 84)  BP: 137/68 (11-16-24 @ 10:00) (87/46 - 145/97)  RR: 18 (11-16-24 @ 10:00) (16 - 18)  SpO2: 100% (11-16-24 @ 10:00) (94% - 100%)  Wt(kg): --  I&O's Summary    15 Nov 2024 07:01  -  16 Nov 2024 07:00  --------------------------------------------------------  IN: 1460 mL / OUT: 0 mL / NET: 1460 mL      Weight (kg): 52.021 (11-14 @ 08:29)    PHYSICAL EXAM:  Appearance: Comfortable. No acute distress  HEENT:  Atraumatic. Normocephalic.  Normal oral mucosa  Neurologic: A & O x 3, no gross focal deficits.  Cardiovascular: RRR S1 S2, No murmur, no rubs/gallops. No JVD  Respiratory: Lungs clear to auscultation, unlabored   Gastrointestinal:  Soft, Non-tender, + BS  Lower Extremities: b/l groin sites without active bleeding or hematoma. b/l LE acyanotic; warm to touch; motor/sensory function intact; + signals b/l DP/PT pulses.   Psychiatry: Patient is calm. No agitation.   Skin: warm and dry.    CURRENT CARDIAC MEDICATIONS:      CURRENT OTHER MEDICATIONS:  sertraline 150 milliGRAM(s) Oral daily  aluminum hydroxide/magnesium hydroxide/simethicone Suspension 30 milliLiter(s) Oral once, Stop order after: 1 Doses PRN Dyspepsia  pantoprazole  Injectable 40 milliGRAM(s) IV Push two times a day  levothyroxine 112 MICROGram(s) Oral daily  rosuvastatin 40 milliGRAM(s) Oral at bedtime  aspirin  chewable 81 milliGRAM(s) Oral daily  chlorhexidine 4% Liquid 1 Application(s) Topical Once, Stop order after: 1 Doses  clopidogrel Tablet 75 milliGRAM(s) Oral daily  influenza  Vaccine (HIGH DOSE) 0.5 milliLiter(s) IntraMuscular once  iron sucrose IVPB 100 milliGRAM(s) IV Intermittent every 24 hours, Stop order after: 2 Days  sodium chloride 0.9%. 1000 milliLiter(s) (50 mL/Hr) IV Continuous <Continuous>      LABS:	 	                            7.5    9.28  )-----------( 213      ( 16 Nov 2024 06:58 )             23.1     11-16    139  |  106  |  15.8  ----------------------------<  104[H]  4.0   |  20.0[L]  |  0.65    Ca    8.1[L]      16 Nov 2024 06:58  Phos  2.8     11-16  Mg     2.1     11-16    TPro  5.5[L]  /  Alb  3.3  /  TBili  0.3[L]  /  DBili  0.1  /  AST  23  /  ALT  7   /  AlkPhos  53  11-16      Lipid Profile: Date: 11-15 @ 04:36  Total cholesterol 118; Direct LDL: --; HDL: 57; Triglycerides:76

## 2024-11-16 NOTE — PROGRESS NOTE ADULT - ASSESSMENT
69y/oF smoker PMH CAD, PCI 2/2024 LAD, multilevel PAD, R iliac intervention 2/2024, COPD, HTN, HLD, carotid and aortic disease, prediabetes. She states that she get left lower leg numbness and pain at rest, and right lower leg cramping when walking a few blocks, which is improved since PTA of the right LAUREL stent. She denies chest pain or SOB, although she states she does not exert herself too much do to her leg pain.   Pt now s/p peripheral angio and angioplasty with stent to L iliac, SFA and popliteal arteries 11/14.  Pt then c/o RLE numbness, pain, with inability to move foot and unobtainable DP or PT pulses with doppler. Sheath pulled emergently, taken emergently back to cath lab for angio, which revealed R CFA thrombus. Thrombectomy and balloon angio performed, started on heparin gtt, which remained o/n. In am, labs significant for drop in Hb to 5.9, also hypotensive. 2u PRBC ordered. CT a/p without RP bleed. Hypotension improving. MICU consulted, deemed stable to remain on floor.     Severe PAD   s/p peripheral angio with angioplasty and stent of L iliac, SFA and popliteal arteries   s/p R CFA thrombus aspiration   -cont DAPT   -cont statin   -cont neurovascular checks   -BLE precautions as per interventional cardio     Acute on chronic anemia   Iron deficiency anemia   Hx AVMs   -hx of avm cauterizations  -gets regular iron infusions   -CT a/p without RP hematoma, +trace infiltration/small hemorrhagic fluid in L groin extending to L external iliac region, no sig hematoma   -micu consult appreciated   -IV Venofer x2, ordered by cardio   -cont po iron on dc   -transfuse 2u PRBC today  -f/u post-transfusion labs   -outpt GI f/u 1-2 weeks   -f/u heme/onc 1-2 weeks     HTN, HLD   -cont statin  -holding antihypertensives due to hypotension today     Tobacco dependence   -nicotine patch   -cessation advised     dispo: anticipate home when medically stable, poss 2-3 days  69y/oF smoker PMH CAD, PCI 2/2024 LAD, multilevel PAD, R iliac intervention 2/2024, COPD, HTN, HLD, carotid and aortic disease, prediabetes. She states that she get left lower leg numbness and pain at rest, and right lower leg cramping when walking a few blocks, which is improved since PTA of the right LAUREL stent. She denies chest pain or SOB, although she states she does not exert herself too much do to her leg pain.   Pt now s/p peripheral angio and angioplasty with stent to L iliac, SFA and popliteal arteries 11/14.  Pt then c/o RLE numbness, pain, with inability to move foot and unobtainable DP or PT pulses with doppler. Sheath pulled emergently, taken emergently back to cath lab for angio, which revealed R CFA thrombus. Thrombectomy and balloon angio performed, started on heparin gtt, which remained o/n. In am, labs significant for drop in Hb to 5.9, also hypotensive. 2u PRBC ordered. CT a/p without RP bleed. Hypotension improving. MICU consulted, deemed stable to remain on floor.     #Severe PAD   s/p peripheral angio with angioplasty and stent of L iliac, SFA and popliteal arteries   s/p R CFA thrombus aspiration   -cont DAPT   -cont statin   -cont neurovascular checks   -BLE precautions as per interventional cardio     #Acute on chronic anemia   #Iron deficiency anemia   #Hx AVMs   -hx of avm cauterizations  -gets regular iron infusions   -CT a/p without RP hematoma, +trace infiltration/small hemorrhagic fluid in L groin extending to L external iliac region, no sig hematoma   -micu consult appreciated   -IV Venofer x2, ordered by cardio   -cont po iron on dc   -s/p 2u PRBC   -f/u post-transfusion labs   -outpt GI f/u 1-2 weeks   -f/u heme/onc 1-2 weeks   -No signs of overt GI bleed    #HTN, HLD   -cont statin  -holding antihypertensives due to hypotension today     #Tobacco dependence   -nicotine patch   -cessation advised     dispo: likely dc today or tomorrow  69y/oF smoker PMH CAD, PCI 2/2024 LAD, multilevel PAD, R iliac intervention 2/2024, COPD, HTN, HLD, carotid and aortic disease, prediabetes. She states that she get left lower leg numbness and pain at rest, and right lower leg cramping when walking a few blocks, which is improved since PTA of the right LAUREL stent. She denies chest pain or SOB, although she states she does not exert herself too much do to her leg pain.   Pt now s/p peripheral angio and angioplasty with stent to L iliac, SFA and popliteal arteries 11/14.  Pt then c/o RLE numbness, pain, with inability to move foot and unobtainable DP or PT pulses with doppler. Sheath pulled emergently, taken emergently back to cath lab for angio, which revealed R CFA thrombus. Thrombectomy and balloon angio performed, started on heparin gtt, which remained o/n. In am, labs significant for drop in Hb to 5.9, also hypotensive. 2u PRBC ordered. CT a/p without RP bleed. Hypotension improving. MICU consulted, deemed stable to remain on floor.     #Severe PAD   s/p peripheral angio with angioplasty and stent of L iliac, SFA and popliteal arteries   s/p R CFA thrombus aspiration   -cont DAPT   -cont statin   -cont neurovascular checks   -BLE precautions as per interventional cardio     #Acute on chronic anemia   #Iron deficiency anemia   #Hx AVMs   -hx of avm cauterizations  -gets regular iron infusions   -CT a/p without RP hematoma, +trace infiltration/small hemorrhagic fluid in L groin extending to L external iliac region, no sig hematoma   -micu consult appreciated   -IV Venofer x2, ordered by cardio   -cont po iron on dc   -s/p 2u PRBC   -f/u post-transfusion labs   -outpt GI f/u 1-2 weeks   -f/u heme/onc 1-2 weeks   -No signs of overt GI bleed  -Advance to regular diet today    #HTN, HLD   -cont statin  -holding antihypertensives due to hypotension today     #Tobacco dependence   -nicotine patch   -cessation advised     dispo: likely dc today or tomorrow  69y/oF smoker PMH CAD, PCI 2/2024 LAD, multilevel PAD, R iliac intervention 2/2024, COPD, HTN, HLD, carotid and aortic disease, prediabetes. She states that she get left lower leg numbness and pain at rest, and right lower leg cramping when walking a few blocks, which is improved since PTA of the right LAUREL stent. She denies chest pain or SOB, although she states she does not exert herself too much do to her leg pain.   Pt now s/p peripheral angio and angioplasty with stent to L iliac, SFA and popliteal arteries 11/14.  Pt then c/o RLE numbness, pain, with inability to move foot and unobtainable DP or PT pulses with doppler. Sheath pulled emergently, taken emergently back to cath lab for angio, which revealed R CFA thrombus. Thrombectomy and balloon angio performed, started on heparin gtt, which remained o/n. In am, labs significant for drop in Hb to 5.9, also hypotensive. 2u PRBC ordered. CT a/p without RP bleed. Hypotension improving. MICU consulted, deemed stable to remain on floor.     #Severe PAD   s/p peripheral angio with angioplasty and stent of L iliac, SFA and popliteal arteries   s/p R CFA thrombus aspiration   -cont DAPT   -cont statin   -cont neurovascular checks   -BLE precautions as per interventional cardio     #Acute on chronic anemia   #Iron deficiency anemia   #Hx AVMs   -hx of avm cauterizations  -gets regular iron infusions   -CT a/p without RP hematoma, +trace infiltration/small hemorrhagic fluid in L groin extending to L external iliac region, no sig hematoma   -micu consult appreciated   -IV Venofer x2, ordered by cardio   -cont po iron on dc   -s/p 2u PRBC   -f/u post-transfusion labs   -outpt GI f/u 1-2 weeks   -f/u heme/onc 1-2 weeks   -No signs of overt GI bleed  -Advance to regular diet today  -FOBT positive  -GI consult appreciated    #HTN, HLD   -cont statin  -holding antihypertensives due to hypotension today     #Tobacco dependence   -nicotine patch   -cessation advised     dispo: likely tomorrow  69y/oF smoker PMH CAD, PCI 2/2024 LAD, multilevel PAD, R iliac intervention 2/2024, COPD, HTN, HLD, carotid and aortic disease, prediabetes. She states that she get left lower leg numbness and pain at rest, and right lower leg cramping when walking a few blocks, which is improved since PTA of the right LAUREL stent. She denies chest pain or SOB, although she states she does not exert herself too much do to her leg pain.   Pt now s/p peripheral angio and angioplasty with stent to L iliac, SFA and popliteal arteries 11/14.  Pt then c/o RLE numbness, pain, with inability to move foot and unobtainable DP or PT pulses with doppler. Sheath pulled emergently, taken emergently back to cath lab for angio, which revealed R CFA thrombus. Thrombectomy and balloon angio performed, started on heparin gtt, which remained o/n. In am, labs significant for drop in Hb to 5.9, also hypotensive. 2u PRBC ordered. CT a/p without RP bleed. Hypotension improving. MICU consulted, deemed stable to remain on floor.     #Severe PAD   s/p peripheral angio with angioplasty and stent of L iliac, SFA and popliteal arteries   s/p R CFA thrombus aspiration   -cont DAPT   -cont statin   -cont neurovascular checks   -BLE precautions as per interventional cardio     #Acute on chronic anemia   #Iron deficiency anemia   #Hx AVMs   -hx of avm cauterizations  -gets regular iron infusions   -CT a/p without RP hematoma, +trace infiltration/small hemorrhagic fluid in L groin extending to L external iliac region, no sig hematoma   -micu consult appreciated   -IV Venofer x2, ordered by cardio   -cont po iron on dc   -s/p 2u PRBC   -No signs of overt GI bleed  -Advance to regular diet today  -FOBT positive  -GI consult appreciated, will f/u recs    #HTN, HLD   -cont statin  -holding antihypertensives as pt normotensive    #Tobacco dependence   -nicotine patch   -cessation advised     dispo: pending GI recs

## 2024-11-16 NOTE — PROGRESS NOTE ADULT - ASSESSMENT
70y/o female smoker with history of coronary artery disease, PCI 2/2024 LAD, multilevel PAD, R iliac intervention 2/2024, COPD, HTN, HLD, carotid and aortic disease, prediabetes. She states that she get left lower leg numbness and pain at rest, and right lower leg cramping when walking a few blocks, which is improved since PTA of the right LAUREL stent.   BOLA on 09/24 revealed  BOLA of 0.40. Mildly abnormal ankle waveforms. No significant changes post exercise. very faint digit waveform TBI not obtainable.  Patient underwent Peripheral Angiography and angioplasty and stent of the left iliac, SFA, and popliteal via RFA. Post procedure, patient complain of RLE pain and numbness, and unable to move foot. No signal DP/PT pulses via RLE so RFA sheath was removed emergently and patient taken back to lab. Repeat peripheral angiogram via LFA revealed a right CFA thrombus in which thrombectomy and balloon angioplasty were performed. LFA sheath pulled with no bleeding or hematoma. Course c/b significant anemia h/h 5.9/18.4 and patient with complaints of dizziness and episodes of hypotension. 2 units prbc were transfused. CT abdomen/ plv performed No significant retroperitoneal hematomas. Trace infiltration/small hemorrhagic fluid in the left groin/sec left external iliac region. Repeat CBC this AM 7.5/23.1. Patient is tolerating ambulating around room this AM without complaints of dizziness, palpitations or lower extremity pain, she reports mild groin tenderness. She has also received IV venofer yesterday. She denies chest pain, chest pressure, shortness of breath, palpitations, dizziness, abdominal pain, back pain or b/l LE pain. b/l groin sites stable w/o active bleeding or hematoma. b/l LE acyanotic; warm to touch; with + DP/ PT signals.       # Severe PAD/ S/P Peripheral angio with angioplasty and stent of the left iliac, SFA, and popliteal./CFA thrombus aspiration    PLAN  1.  Severe PAD/ S/P Peripheral angio with angioplasty and stent of the left iliac, SFA, and popliteal. Course c/b Right CFA thrombus s/p thrombectomy and aspiration  -POD 2 Peripheral Angiography and angioplasty and stent of the left iliac, SFA, and popliteal., Vascular Access: RFA  -Post angiogram,  Patient c/o RLE numbness and pain, unable to move foot. Unable to obtain doppler DP or PT pulses. Patient taken back to [facility] 11/14 for peripheral angiogram via LFA revealing right CFA thrombus s/p thrombectomy  -B/L Groin benign, no bleeding or hematoma, B/L LE acyanotic; warm to touch; +sensation; +DP/PT signals with doppler   -pending art duplex results  -Continue DAPT with aspirin and plavix  -DAPT compliancy reinforced with patient  -Continue statin with rosuvastatin 40mg PO QHS  -CT abdomen/ PLV:  No significant retroperitoneal hematomas. Trace infiltration/small hemorrhagic fluid in the left groin/sec left external iliac region.  -Notify interventional cardiology team immediately if patient develops any increase in groin tenderness, groin swelling or any active bleeding or hematoma at groin sites.   -Patient to follow up with Dr. Wai Agrawal in 1 week post discharge.     2. Acute Anemia.  -h/h 11.6/35.8 -> 5.9/18.4 (11/15) s/p 2 units prbc -> 7.5/ 23.1 (11/16)  -prolonged procedure time in [facility] ~ 7hours and had repeat peripheral angiogram same day for RCFA thrombectomyPatient spent prolonged time in cath lab for procedure (close to 7 hours) , and 2 hours for repeat procedure of CFA  -Hx of AVMs and avm cauterizations  in the past, JASSI, she gets IV iron in every few weeks   - s/p 2 units prbc yesterday 11/15/24 with improvement h/h 7.5/23.1  -CT abdomen/ PLV:  No significant retroperitoneal hematomas. Trace infiltration/small hemorrhagic fluid in the left groin/sec left external iliac region.  - IV Venofer dose yesterday and please give additional IV venofer dose today.  -D/C home on oral iron supplements  -OP Gi consult in 1-2 weeks  -pending stool OB  -OP Hematology follow up in 1 week with Dr. Mitchell ([place]). Patient to continue outpatient IV iron transfusions      3. Smoking Cessation:  -Plan: Nicotine Replacement: Patch 14mg daily  -Smoking Cessation education provided        68y/o female smoker with history of coronary artery disease, PCI 2/2024 LAD, multilevel PAD, R iliac intervention 2/2024, COPD, HTN, HLD, carotid and aortic disease, prediabetes. She states that she get left lower leg numbness and pain at rest, and right lower leg cramping when walking a few blocks, which is improved since PTA of the right LAUREL stent.   BOLA on 09/24 revealed  BOLA of 0.40. Mildly abnormal ankle waveforms. No significant changes post exercise. very faint digit waveform TBI not obtainable.  Patient underwent Peripheral Angiography and angioplasty and stent of the left iliac, SFA, and popliteal via RFA. Post procedure, patient complain of RLE pain and numbness, and unable to move foot. No signal DP/PT pulses via RLE so RFA sheath was removed emergently and patient taken back to lab. Repeat peripheral angiogram via LFA revealed a right CFA thrombus in which thrombectomy and balloon angioplasty were performed. LFA sheath pulled with no bleeding or hematoma. Course c/b significant anemia h/h 5.9/18.4 and patient with complaints of dizziness and episodes of hypotension. 2 units prbc were transfused. CT abdomen/ plv performed No significant retroperitoneal hematomas. Trace infiltration/small hemorrhagic fluid in the left groin/sec left external iliac region. Repeat CBC this AM 7.5/23.1. Patient is tolerating ambulating around room this AM without complaints of dizziness, palpitations or lower extremity pain, she reports mild groin tenderness. She has also received IV venofer yesterday. She denies chest pain, chest pressure, shortness of breath, palpitations, dizziness, abdominal pain, back pain or b/l LE pain. b/l groin sites stable w/o active bleeding or hematoma. b/l LE acyanotic; warm to touch; with + DP/ PT signals.       # Severe PAD/ S/P Peripheral angio with angioplasty and stent of the left iliac, SFA, and popliteal./CFA thrombus aspiration    PLAN  1.  Severe PAD/ S/P Peripheral angio with angioplasty and stent of the left iliac, SFA, and popliteal. Course c/b Right CFA thrombus s/p thrombectomy and aspiration  -POD 2 Peripheral Angiography and angioplasty and stent of the left iliac, SFA, and popliteal., Vascular Access: RFA  -Post angiogram,  Patient c/o RLE numbness and pain, unable to move foot. Unable to obtain doppler DP or PT pulses. Patient taken back to [facility] 11/14 for peripheral angiogram via LFA revealing right CFA thrombus s/p thrombectomy  -B/L Groin benign, no bleeding or hematoma, B/L LE acyanotic; warm to touch; +sensation; +DP/PT signals with doppler   -pending art duplex results  -Continue DAPT with aspirin and plavix  -DAPT compliancy reinforced with patient  -Continue statin with rosuvastatin 40mg PO QHS  -CT abdomen/ PLV:  No significant retroperitoneal hematomas. Trace infiltration/small hemorrhagic fluid in the left groin/sec left external iliac region.  -Notify interventional cardiology team immediately if patient develops any increase in groin tenderness, groin swelling or any active bleeding or hematoma at groin sites.   -Patient to follow up with Dr. Wai Agrawal in 1 week post discharge.     2. Acute Anemia.  -h/h 11.6/35.8 -> 5.9/18.4 (11/15) s/p 2 units prbc -> 7.5/ 23.1 (11/16)  -prolonged procedure time in [facility] ~ 7hours and had repeat peripheral angiogram same day for RCFA thrombectomyPatient spent prolonged time in cath lab for procedure (close to 7 hours) , and 2 hours for repeat procedure of CFA  -Hx of AVMs and avm cauterizations  in the past, JASSI, she gets IV iron in every few weeks   - s/p 2 units prbc yesterday 11/15/24 with improvement h/h 7.5/23.1  -CT abdomen/ PLV:  No significant retroperitoneal hematomas. Trace infiltration/small hemorrhagic fluid in the left groin/sec left external iliac region.  - IV Venofer dose yesterday and please give additional IV venofer dose today.  - oral iron supplements upon d/c  -pending stool OB  -OP Hematology follow up in 1 week with Dr. Mitchell ([place]). Patient to continue outpatient IV iron transfusions      3. Smoking Cessation:  -Plan: Nicotine Replacement: Patch 14mg daily  -Smoking Cessation education provided        70y/o female smoker with history of coronary artery disease, PCI 2/2024 LAD, multilevel PAD, R iliac intervention 2/2024, COPD, HTN, HLD, carotid and aortic disease, prediabetes. She states that she get left lower leg numbness and pain at rest, and right lower leg cramping when walking a few blocks, which is improved since PTA of the right LAUREL stent.   BOLA on 09/24 revealed  BOAL of 0.40. Mildly abnormal ankle waveforms. No significant changes post exercise. very faint digit waveform TBI not obtainable.  Patient underwent Peripheral Angiography and angioplasty and stent of the left iliac, SFA, and popliteal via RFA. Post procedure, patient complain of RLE pain and numbness, and unable to move foot. No signal DP/PT pulses via RLE so RFA sheath was removed emergently and patient taken back to lab. Repeat peripheral angiogram via LFA revealed a right CFA thrombus in which thrombectomy and balloon angioplasty were performed. LFA sheath pulled with no bleeding or hematoma. Course c/b significant anemia h/h 5.9/18.4 and patient with complaints of dizziness and episodes of hypotension. 2 units prbc were transfused. CT abdomen/ plv performed No significant retroperitoneal hematomas. Trace infiltration/small hemorrhagic fluid in the left groin/sec left external iliac region. Repeat CBC this AM 7.5/23.1. Patient is tolerating ambulating around room this AM without complaints of dizziness, palpitations or lower extremity pain, she reports mild groin tenderness. She has also received IV venofer yesterday. She denies chest pain, chest pressure, shortness of breath, palpitations, dizziness, abdominal pain, back pain or b/l LE pain. b/l groin sites stable w/o active bleeding or hematoma. b/l LE acyanotic; warm to touch; with + DP/ PT signals.       # Severe PAD/ S/P Peripheral angio with angioplasty and stent of the left iliac, SFA, and popliteal./CFA thrombus aspiration    PLAN  1.  Severe PAD/ S/P Peripheral angio with angioplasty and stent of the left iliac, SFA, and popliteal. Course c/b Right CFA thrombus s/p thrombectomy and aspiration  -POD 2 Peripheral Angiography and angioplasty and stent of the left iliac, SFA, and popliteal., Vascular Access: RFA  -Post angiogram,  Patient c/o RLE numbness and pain, unable to move foot. Unable to obtain doppler DP or PT pulses. Patient taken back to [facility] 11/14 for peripheral angiogram via LFA revealing right CFA thrombus s/p thrombectomy  -B/L Groin benign, no bleeding or hematoma, B/L LE acyanotic; warm to touch; +sensation; +DP/PT signals with doppler   -pending art duplex results  -Continue DAPT with aspirin and plavix without interruptions  -DAPT compliancy reinforced with patient  -Continue statin with rosuvastatin 40mg PO QHS  -CT abdomen/ PLV:  No significant retroperitoneal hematomas. Trace infiltration/small hemorrhagic fluid in the left groin/sec left external iliac region.  -Notify interventional cardiology team immediately if patient develops any increase in groin tenderness, groin swelling or any active bleeding or hematoma at groin sites.   -Patient to follow up with Dr. Wai Agrawal in 1 week post discharge.     2. Acute Anemia.  -h/h 11.6/35.8 -> 5.9/18.4 (11/15) s/p 2 units prbc -> 7.5/ 23.1 (11/16)  -prolonged procedure time in [facility] ~ 7hours and had repeat peripheral angiogram same day for RCFA thrombectomyPatient spent prolonged time in cath lab for procedure (close to 7 hours) , and 2 hours for repeat procedure of CFA  -Hx of AVMs and avm cauterizations  in the past, JASSI, she gets IV iron in every few weeks   - s/p 2 units prbc yesterday 11/15/24 with improvement h/h 7.5/23.1  -CT abdomen/ PLV:  No significant retroperitoneal hematomas. Trace infiltration/small hemorrhagic fluid in the left groin/sec left external iliac region.  - IV Venofer dose yesterday and please give additional IV venofer dose today.  - oral iron supplements upon d/c  -pending stool OB   -OP Hematology follow up in 1 week with Dr. Mitchell ([place]). Patient to continue outpatient IV iron transfusions      3. Smoking Cessation:  -Plan: Nicotine Replacement: Patch 14mg daily  -Smoking Cessation education provided

## 2024-11-16 NOTE — DISCHARGE NOTE PROVIDER - ATTENDING DISCHARGE PHYSICAL EXAMINATION:
General: Age-appearing, in no acute distress  Head: Normocephalic, atraumatic  ENMT: EOMI, no scleral icterus, neck supple, no JVD  Cardiovascular: +S1, S2; Regular rate and rhythm, no murmurs.  Respiratory: CTAB, no wheezing, no rales, no rhonchi  Gastrointestinal: soft, ND, NT, (+) BS, (-) rebound  Neuro: AAOx3, CN2-12 intact, no FND  Musculoskeletal: Normal tone, no deformities  Skin: Warm, dry, no rash, no jaundice  Psych: Calm, cooperative  pt is seen 11/19am , no complaints   feeling well   Vital Signs Last 24 Hrs  T(C): 36.7 (19 Nov 2024 08:10), Max: 37.4 (19 Nov 2024 00:26)  T(F): 98.1 (19 Nov 2024 08:10), Max: 99.3 (19 Nov 2024 00:26)  HR: 69 (19 Nov 2024 08:10) (60 - 75)  BP: 108/69 (19 Nov 2024 08:10) (108/69 - 169/82)  BP(mean): 108 (18 Nov 2024 16:00) (89 - 108)  RR: 18 (19 Nov 2024 08:10) (16 - 18)  SpO2: 97% (19 Nov 2024 08:10) (95% - 99%)    Parameters below as of 19 Nov 2024 08:10  Patient On (Oxygen Delivery Method): room air    Lungs CTA   Heart Regular s1, s2   Abd soft no tenderness   ext no edema

## 2024-11-16 NOTE — DISCHARGE NOTE PROVIDER - PROVIDER TOKENS
PROVIDER:[TOKEN:[[id number]],FOLLOWUP:[1 week],ESTABLISHEDPATIENT:[T]],PROVIDER:[TOKEN:[[id number]],FOLLOWUP:[1 week],ESTABLISHEDPATIENT:[T]] PROVIDER:[TOKEN:[[id number]],FOLLOWUP:[1 week],ESTABLISHEDPATIENT:[T]],PROVIDER:[TOKEN:[[id number]],FOLLOWUP:[1 week],ESTABLISHEDPATIENT:[T]],PROVIDER:[TOKEN:[[id number]],FOLLOWUP:[1 month]]

## 2024-11-16 NOTE — DISCHARGE NOTE PROVIDER - HOSPITAL COURSE
Render Post-Care Instructions In Note?: yes Consent: The patient's consent was obtained including but not limited to risks of crusting, scabbing, blistering, scarring, darker or lighter pigmentary change, recurrence, incomplete removal and infection. Detail Level: Detailed Duration Of Freeze Thaw-Cycle (Seconds): 0 Post-Care Instructions: I reviewed with the patient in detail post-care instructions. Patient is to wear sunprotection, and avoid picking at any of the treated lesions. Pt may apply Vaseline to crusted or scabbing areas. If not resolved in 4-6 weeks, return to clinic for further evaluation and treatment. 69y/oF smoker PMH CAD, PCI 2/2024 LAD, multilevel PAD, R iliac intervention 2/2024, COPD, HTN, HLD, carotid and aortic disease, prediabetes. She states that she get left lower leg numbness and pain at rest, and right lower leg cramping when walking a few blocks, which is improved since PTA of the right LAUREL stent. She denies chest pain or SOB, although she states she does not exert herself too much do to her leg pain. Pt s/p peripheral angio and angioplasty with stent to L iliac, SFA and popliteal arteries 11/14.  Pt then c/o RLE numbness, pain, with inability to move foot and unobtainable DP or PT pulses with doppler. Sheath pulled emergently, taken emergently back to cath lab for angio, which revealed R CFA thrombus. Thrombectomy and balloon angio performed, started on heparin gtt, which remained o/n. In am, labs significant for drop in Hb to 5.9, also hypotensive. 2u PRBC ordered. CT a/p without RP bleed. Hypotension resolved. MICU consulted, deemed stable to remain on floor. The patient was monitored serially, clinically improved, and ultimately deemed stable for discharge. 69y/oF smoker PMH CAD, PCI 2/2024 LAD, multilevel PAD, R iliac intervention 2/2024, COPD, HTN, HLD, carotid and aortic disease, prediabetes. She states that she get left lower leg numbness and pain at rest, and right lower leg cramping when walking a few blocks, which is improved since PTA of the right LAUREL stent. She denies chest pain or SOB, although she states she does not exert herself too much do to her leg pain. Pt s/p peripheral angio and angioplasty with stent to L iliac, SFA and popliteal arteries 11/14.  Pt then c/o RLE numbness, pain, with inability to move foot and unobtainable DP or PT pulses with doppler. Sheath pulled emergently, taken emergently back to cath lab for angio, which revealed R CFA thrombus. Thrombectomy and balloon angio performed, started on heparin gtt, which remained o/n. In am, labs significant for drop in Hb to 5.9, also hypotensive. 2u PRBC ordered. CT a/p without RP bleed. Hypotension resolved. MICU consulted, deemed stable to remain on floor. EGD today 11/18 showed esophagitis cont PPI   resumed BP meds   HB remains stable   The patient was monitored serially, clinically improved, and ultimately deemed stable for discharge.

## 2024-11-16 NOTE — DISCHARGE NOTE PROVIDER - CARE PROVIDER_API CALL
Wai Agrawal)  Cardiovascular Disease  [address] [place] [address]  Phone: [phone]  Fax: [phone]  Established Patient  Follow Up Time: 1 week    Herb Mitchell  Hematology  [address] [place] [address]  Phone: [phone]  Fax: [phone]  Established Patient  Follow Up Time: 1 week   Wai Agrawal)  Cardiovascular Disease  [address] [place] [address]  Phone: [phone]  Fax: [phone]  Established Patient  Follow Up Time: 1 week    Herb Mitchell  Hematology  [address] [place] [address]  Phone: [phone]  Fax: [phone]  Established Patient  Follow Up Time: 1 week    Cedric Morales  Gastroenterology  [address] [facility], [address]  [place] [address]  Phone: [phone]  Fax: [phone]  Follow Up Time: 1 month

## 2024-11-17 LAB
ANION GAP SERPL CALC-SCNC: 11 MMOL/L — SIGNIFICANT CHANGE UP (ref 5–17)
BUN SERPL-MCNC: 7.3 MG/DL — LOW (ref 8–20)
CALCIUM SERPL-MCNC: 8.5 MG/DL — SIGNIFICANT CHANGE UP (ref 8.4–10.5)
CHLORIDE SERPL-SCNC: 107 MMOL/L — SIGNIFICANT CHANGE UP (ref 96–108)
CO2 SERPL-SCNC: 23 MMOL/L — SIGNIFICANT CHANGE UP (ref 22–29)
CREAT SERPL-MCNC: 0.49 MG/DL — LOW (ref 0.5–1.3)
EGFR: 101 ML/MIN/1.73M2 — SIGNIFICANT CHANGE UP
GLUCOSE SERPL-MCNC: 109 MG/DL — HIGH (ref 70–99)
HCT VFR BLD CALC: 22.6 % — LOW (ref 34.5–45)
HGB BLD-MCNC: 7.3 G/DL — LOW (ref 11.5–15.5)
MCHC RBC-ENTMCNC: 26.6 PG — LOW (ref 27–34)
MCHC RBC-ENTMCNC: 32.3 G/DL — SIGNIFICANT CHANGE UP (ref 32–36)
MCV RBC AUTO: 82.5 FL — SIGNIFICANT CHANGE UP (ref 80–100)
PLATELET # BLD AUTO: 237 K/UL — SIGNIFICANT CHANGE UP (ref 150–400)
POTASSIUM SERPL-MCNC: 4.1 MMOL/L — SIGNIFICANT CHANGE UP (ref 3.5–5.3)
POTASSIUM SERPL-SCNC: 4.1 MMOL/L — SIGNIFICANT CHANGE UP (ref 3.5–5.3)
RBC # BLD: 2.74 M/UL — LOW (ref 3.8–5.2)
RBC # FLD: 15.6 % — HIGH (ref 10.3–14.5)
SODIUM SERPL-SCNC: 141 MMOL/L — SIGNIFICANT CHANGE UP (ref 135–145)
WBC # BLD: 7.84 K/UL — SIGNIFICANT CHANGE UP (ref 3.8–10.5)
WBC # FLD AUTO: 7.84 K/UL — SIGNIFICANT CHANGE UP (ref 3.8–10.5)

## 2024-11-17 PROCEDURE — 99232 SBSQ HOSP IP/OBS MODERATE 35: CPT

## 2024-11-17 RX ORDER — ACETAMINOPHEN, DIPHENHYDRAMINE HCL, PHENYLEPHRINE HCL 325; 25; 5 MG/1; MG/1; MG/1
3 TABLET ORAL AT BEDTIME
Refills: 0 | Status: DISCONTINUED | OUTPATIENT
Start: 2024-11-17 | End: 2024-11-19

## 2024-11-17 RX ADMIN — NICOTINE 1 PATCH: 21 PATCH, EXTENDED RELEASE TRANSDERMAL at 12:50

## 2024-11-17 RX ADMIN — ROSUVASTATIN CALCIUM 40 MILLIGRAM(S): 5 TABLET, FILM COATED ORAL at 21:41

## 2024-11-17 RX ADMIN — NICOTINE 1 PATCH: 21 PATCH, EXTENDED RELEASE TRANSDERMAL at 19:00

## 2024-11-17 RX ADMIN — SERTRALINE HYDROCHLORIDE 150 MILLIGRAM(S): 100 TABLET, FILM COATED ORAL at 12:20

## 2024-11-17 RX ADMIN — ACETAMINOPHEN, DIPHENHYDRAMINE HCL, PHENYLEPHRINE HCL 3 MILLIGRAM(S): 325; 25; 5 TABLET ORAL at 00:59

## 2024-11-17 RX ADMIN — Medication 30 MILLILITER(S): at 12:48

## 2024-11-17 RX ADMIN — NICOTINE 1 PATCH: 21 PATCH, EXTENDED RELEASE TRANSDERMAL at 12:24

## 2024-11-17 RX ADMIN — PANTOPRAZOLE SODIUM 40 MILLIGRAM(S): 40 TABLET, DELAYED RELEASE ORAL at 05:00

## 2024-11-17 RX ADMIN — PANTOPRAZOLE SODIUM 40 MILLIGRAM(S): 40 TABLET, DELAYED RELEASE ORAL at 17:15

## 2024-11-17 RX ADMIN — Medication 112 MICROGRAM(S): at 05:00

## 2024-11-17 RX ADMIN — CLOPIDOGREL 75 MILLIGRAM(S): 75 TABLET, FILM COATED ORAL at 12:20

## 2024-11-17 RX ADMIN — Medication 81 MILLIGRAM(S): at 12:20

## 2024-11-17 RX ADMIN — NICOTINE 1 PATCH: 21 PATCH, EXTENDED RELEASE TRANSDERMAL at 12:19

## 2024-11-17 NOTE — PROGRESS NOTE ADULT - SUBJECTIVE AND OBJECTIVE BOX
Chief Complaint:  Patient is a 69y old  Female who presents with a chief complaint of vascular procedure (16 Nov 2024 15:54)      HPI/ 24 hr events:Patient seen and examined while eating breakfast.  She says she slept poorly but otherwise feels well.  She denies overt GI bleeding.    MEDICATIONS:   MEDICATIONS  (STANDING):  aspirin  chewable 81 milliGRAM(s) Oral daily  chlorhexidine 4% Liquid 1 Application(s) Topical Once  clopidogrel Tablet 75 milliGRAM(s) Oral daily  influenza  Vaccine (HIGH DOSE) 0.5 milliLiter(s) IntraMuscular once  levothyroxine 112 MICROGram(s) Oral daily  nicotine -  14 mG/24Hr(s) Patch 1 Patch Transdermal daily  pantoprazole  Injectable 40 milliGRAM(s) IV Push two times a day  rosuvastatin 40 milliGRAM(s) Oral at bedtime  sertraline 150 milliGRAM(s) Oral daily  sodium chloride 0.9%. 1000 milliLiter(s) (50 mL/Hr) IV Continuous <Continuous>    MEDICATIONS  (PRN):  aluminum hydroxide/magnesium hydroxide/simethicone Suspension 30 milliLiter(s) Oral once PRN Dyspepsia  melatonin 3 milliGRAM(s) Oral at bedtime PRN Insomnia  nicotine  Polacrilex Lozenge 4 milliGRAM(s) Oral every 3 hours PRN craving      ALLERGIES:   Allergies    No Known Allergies    Intolerances    Naprosyn (Other; Stomach Upset)  aspirin (Other; Stomach Upset)      VITAL SIGNS:   Vital Signs Last 24 Hrs  T(C): 37.2 (17 Nov 2024 07:46), Max: 37.2 (16 Nov 2024 19:53)  T(F): 98.9 (17 Nov 2024 07:46), Max: 98.9 (16 Nov 2024 19:53)  HR: 72 (17 Nov 2024 10:00) (67 - 89)  BP: 138/69 (17 Nov 2024 10:00) (118/74 - 140/69)  BP(mean): 92 (17 Nov 2024 10:00) (79 - 101)  RR: 18 (17 Nov 2024 10:00) (16 - 19)  SpO2: 97% (17 Nov 2024 10:00) (95% - 100%)    Parameters below as of 17 Nov 2024 10:00  Patient On (Oxygen Delivery Method): room air      I&O's Summary    16 Nov 2024 07:01  -  17 Nov 2024 07:00  --------------------------------------------------------  IN: 736 mL / OUT: 851 mL / NET: -115 mL        PHYSICAL EXAM:   GENERAL:  No acute distress  HEENT:  NC/AT, conjunctiva clear, sclera anicteric  CHEST:  No increased effort, breath sounds clear  HEART:  Regular rate   ABDOMEN:  Soft, non-tender, non-distended, normoactive bowel sounds, no rebound or guarding  EXTREMITIES: No edema  SKIN:  Warm, dry  NEURO:  Calm, cooperative    LABS:                        7.3    7.84  )-----------( 237      ( 17 Nov 2024 05:40 )             22.6     11-17    141  |  107  |  7.3[L]  ----------------------------<  109[H]  4.1   |  23.0  |  0.49[L]    Ca    8.5      17 Nov 2024 05:40  Phos  2.8     11-16  Mg     2.1     11-16    TPro  5.5[L]  /  Alb  3.3  /  TBili  0.3[L]  /  DBili  0.1  /  AST  23  /  ALT  7   /  AlkPhos  53  11-16    LIVER FUNCTIONS - ( 16 Nov 2024 06:58 )  Alb: 3.3 g/dL / Pro: 5.5 g/dL / ALK PHOS: 53 U/L / ALT: 7 U/L / AST: 23 U/L / GGT: x             Urinalysis Basic - ( 17 Nov 2024 05:40 )    Color: x / Appearance: x / SG: x / pH: x  Gluc: 109 mg/dL / Ketone: x  / Bili: x / Urobili: x   Blood: x / Protein: x / Nitrite: x   Leuk Esterase: x / RBC: x / WBC x   Sq Epi: x / Non Sq Epi: x / Bacteria: x                                    RADIOLOGY & ADDITIONAL STUDIES:      ACC: [id number] EXAM:  CT ABDOMEN AND PELVIS   ORDERED BY: NOEMI LINDSEY     PROCEDURE DATE:  11/15/2024          INTERPRETATION:  CLINICAL INFORMATION: Status post peripheral   intervention, evaluate for retroperitoneal bleed    COMPARISON: None.    CONTRAST/COMPLICATIONS:  IV Contrast: NONE  Oral Contrast: NONE      PROCEDURE:  CT of the Abdomen and Pelvis was performed.  Sagittal and coronal reformats were performed.    FINDINGS:  LOWER CHEST: Within normal limits.    LIVER: Within normal limits.  BILE DUCTS: Normal caliber.  GALLBLADDER: Residual contrast in the gallbladder.  SPLEEN: Within normal limits.  PANCREAS: Within normal limits.  ADRENALS: Within normal limits.  KIDNEYS/URETERS: Residual contrast in the bilateral renal parenchyma and   renal collecting system correlate for timing of recent contrast   administration. Correlate with creatinine levels to exclude ATN from   contrast administration    BLADDER: Residual contrast in the bladder.  REPRODUCTIVE ORGANS: No pelvic mass. No pelvic free fluid.    BOWEL: No bowel obstruction. Appendix is normal.  PERITONEUM/RETROPERITONEUM: No ascites. No significant retroperitoneal   hematoma identified.  VESSELS: Atherosclerotic changes.  LYMPH NODES: No lymphadenopathy.  ABDOMINAL WALL: Minimal stranding/isodense fluid seen in the left groin   extending into the left external iliac region likely representing   postprocedural changes. No significant hematoma identified.  BONES: Degenerative changes.    IMPRESSION:  No significant retroperitoneal hematomas. Trace infiltration/small   hemorrhagic fluid in the left groin/sec left external iliac region.        --- End of Report ---            CLEO MCCARTHY MD; Attending Radiologist  This document has been electronically signed. Nov [id number] 10:48AM  11-15-24 @ 09:27

## 2024-11-17 NOTE — PROGRESS NOTE ADULT - ASSESSMENT
69y/oF smoker PMH CAD, PCI 2/2024 LAD, multilevel PAD, R iliac intervention 2/2024, COPD, HTN, HLD, carotid and aortic disease, prediabetes. She states that she get left lower leg numbness and pain at rest, and right lower leg cramping when walking a few blocks, which is improved since PTA of the right LAUREL stent. She denies chest pain or SOB, although she states she does not exert herself too much do to her leg pain.   Pt now s/p peripheral angio and angioplasty with stent to L iliac, SFA and popliteal arteries 11/14.  Pt then c/o RLE numbness, pain, with inability to move foot and unobtainable DP or PT pulses with doppler. Sheath pulled emergently, taken emergently back to cath lab for angio, which revealed R CFA thrombus. Thrombectomy and balloon angio performed, started on heparin gtt, which remained o/n. In am, labs significant for drop in Hb to 5.9, also hypotensive. 2u PRBC ordered. CT a/p without RP bleed. Hypotension improving. MICU consulted, deemed stable to remain on floor.     #Severe PAD   s/p peripheral angio with angioplasty and stent of L iliac, SFA and popliteal arteries   s/p R CFA thrombus aspiration   -cont DAPT   -cont statin   -cont neurovascular checks   -BLE precautions as per interventional cardio     #Acute on chronic anemia   #Iron deficiency anemia   #Hx AVMs   -hx of avm cauterizations  -gets regular iron infusions   -CT a/p without RP hematoma, +trace infiltration/small hemorrhagic fluid in L groin extending to L external iliac region, no sig hematoma   -micu consult appreciated   -IV Venofer x2, ordered by cardio   -cont po iron on dc   -s/p 2u PRBC   -No signs of overt GI bleed  -Advance to regular diet today  -FOBT positive  -GI consult appreciated, will f/u recs    #HTN, HLD   -cont statin  -holding antihypertensives as pt normotensive    #Tobacco dependence   -nicotine patch   -cessation advised     dispo: pending GI recs 69y/oF smoker PMH CAD, PCI 2/2024 LAD, multilevel PAD, R iliac intervention 2/2024, COPD, HTN, HLD, carotid and aortic disease, prediabetes. She states that she get left lower leg numbness and pain at rest, and right lower leg cramping when walking a few blocks, which is improved since PTA of the right LAUREL stent. She denies chest pain or SOB, although she states she does not exert herself too much do to her leg pain.   Pt now s/p peripheral angio and angioplasty with stent to L iliac, SFA and popliteal arteries 11/14.  Pt then c/o RLE numbness, pain, with inability to move foot and unobtainable DP or PT pulses with doppler. Sheath pulled emergently, taken emergently back to cath lab for angio, which revealed R CFA thrombus. Thrombectomy and balloon angio performed, started on heparin gtt, which remained o/n. In am, labs significant for drop in Hb to 5.9, also hypotensive. 2u PRBC ordered. CT a/p without RP bleed. Hypotension improving. MICU consulted, deemed stable to remain on floor.     #Severe PAD   s/p peripheral angio with angioplasty and stent of L iliac, SFA and popliteal arteries   s/p R CFA thrombus aspiration   -cont DAPT   -cont statin   -cont neurovascular checks   -BLE precautions as per interventional cardio     #Acute on chronic anemia   #Iron deficiency anemia   #Hx AVMs   -hx of avm cauterizations  -gets regular iron infusions   -CT a/p without RP hematoma, +trace infiltration/small hemorrhagic fluid in L groin extending to L external iliac region, no sig hematoma   -MICU consult appreciated   -IV Venofer x2, ordered by cardio   -cont po iron on dc   -s/p 2u PRBC   -No signs of overt GI bleed  -Advance to regular diet today  -FOBT positive  -GI consult appreciated, recs noted - plan for EGD/enteroscopy tomorrow 11/18, NPO at midnight    #HTN, HLD   -cont statin  -holding antihypertensives as pt normotensive    #Tobacco dependence   -nicotine patch   -cessation advised     dispo: pending endoscopic workup tomorrow 11/18 69y/oF smoker PMH CAD, PCI 2/2024 LAD, multilevel PAD, R iliac intervention 2/2024, COPD, HTN, HLD, carotid and aortic disease, prediabetes. She states that she get left lower leg numbness and pain at rest, and right lower leg cramping when walking a few blocks, which is improved since PTA of the right LAUREL stent. She denies chest pain or SOB, although she states she does not exert herself too much do to her leg pain.   Pt now s/p peripheral angio and angioplasty with stent to L iliac, SFA and popliteal arteries 11/14.  Pt then c/o RLE numbness, pain, with inability to move foot and unobtainable DP or PT pulses with doppler. Sheath pulled emergently, taken emergently back to cath lab for angio, which revealed R CFA thrombus. Thrombectomy and balloon angio performed, started on heparin gtt, which remained o/n. In am, labs significant for drop in Hb to 5.9, also hypotensive. 2u PRBC ordered. CT a/p without RP bleed. Hypotension improving. MICU consulted, deemed stable to remain on floor.     #Severe PAD   s/p peripheral angio with angioplasty and stent of L iliac, SFA and popliteal arteries   s/p R CFA thrombus aspiration   -cont DAPT   -cont statin   -cont neurovascular checks   -BLE precautions as per interventional cardio     #Acute on chronic anemia   #Iron deficiency anemia   #Hx AVMs   -hx of avm cauterizations  -gets regular iron infusions   -CT a/p without RP hematoma, +trace infiltration/small hemorrhagic fluid in L groin extending to L external iliac region, no sig hematoma   -MICU consult appreciated   -IV Venofer x2, ordered by cardio   -cont po iron on dc   -s/p 2u PRBC   -No signs of overt GI bleed  -Regular diet  -Monitor H/H  -Transfuse to keep Hb>8 if clinically indicated  -FOBT positive  -GI consult appreciated, recs noted - plan for EGD/enteroscopy tomorrow 11/18, NPO at midnight    #HTN, HLD   -cont statin  -holding antihypertensives as pt normotensive    #Tobacco dependence   -nicotine patch   -cessation advised     dispo: pending endoscopic workup tomorrow 11/18

## 2024-11-17 NOTE — PROGRESS NOTE ADULT - SUBJECTIVE AND OBJECTIVE BOX
[facility] Division of Hospital Medicine    SUBJECTIVE / OVERNIGHT EVENTS:    Pt seen and examined at bedside. Pt has no complaints. States feels well. Denies CP, SOB, N/V/D, abd pain, fever, chills. Rest of ROS (-).     MEDICATIONS  (STANDING):  aspirin  chewable 81 milliGRAM(s) Oral daily  chlorhexidine 4% Liquid 1 Application(s) Topical Once  clopidogrel Tablet 75 milliGRAM(s) Oral daily  influenza  Vaccine (HIGH DOSE) 0.5 milliLiter(s) IntraMuscular once  levothyroxine 112 MICROGram(s) Oral daily  nicotine -  14 mG/24Hr(s) Patch 1 Patch Transdermal daily  pantoprazole  Injectable 40 milliGRAM(s) IV Push two times a day  rosuvastatin 40 milliGRAM(s) Oral at bedtime  sertraline 150 milliGRAM(s) Oral daily    MEDICATIONS  (PRN):  melatonin 3 milliGRAM(s) Oral at bedtime PRN Insomnia  nicotine  Polacrilex Lozenge 4 milliGRAM(s) Oral every 3 hours PRN craving        I&O's Summary    16 Nov 2024 07:01  -  17 Nov 2024 07:00  --------------------------------------------------------  IN: 736 mL / OUT: 851 mL / NET: -115 mL        PHYSICAL EXAM:  Vital Signs Last 24 Hrs  T(C): 36.8 (17 Nov 2024 12:30), Max: 37.2 (16 Nov 2024 19:53)  T(F): 98.3 (17 Nov 2024 12:30), Max: 98.9 (16 Nov 2024 19:53)  HR: 77 (17 Nov 2024 14:00) (67 - 89)  BP: 173/74 (17 Nov 2024 14:00) (118/74 - 173/74)  BP(mean): 103 (17 Nov 2024 14:00) (79 - 103)  RR: 18 (17 Nov 2024 14:00) (16 - 20)  SpO2: 98% (17 Nov 2024 14:00) (95% - 100%)    Parameters below as of 17 Nov 2024 14:00  Patient On (Oxygen Delivery Method): room air            General: Age-appearing, in no acute distress  Head: Normocephalic, atraumatic  ENMT: EOMI, no scleral icterus, neck supple, no JVD  Cardiovascular: +S1, S2; Regular rate and rhythm, no murmurs.  Respiratory: CTAB, no wheezing, no rales, no rhonchi  Gastrointestinal: soft, ND, NT, (+) BS, (-) rebound  Neuro: AAOx3, CN2-12 intact, no FND  Musculoskeletal: Normal tone, no deformities  Skin: Warm, dry, no rash, no jaundice  Psych: Calm, cooperative     LABS:                        7.3    7.84  )-----------( 237      ( 17 Nov 2024 05:40 )             22.6     11-17    141  |  107  |  7.3[L]  ----------------------------<  109[H]  4.1   |  23.0  |  0.49[L]    Ca    8.5      17 Nov 2024 05:40  Phos  2.8     11-16  Mg     2.1     11-16    TPro  5.5[L]  /  Alb  3.3  /  TBili  0.3[L]  /  DBili  0.1  /  AST  23  /  ALT  7   /  AlkPhos  53  11-16      CARDIAC MARKERS ( 15 Nov 2024 14:24 )  x     / x     / x     / x     / 5.8 ng/mL      Urinalysis Basic - ( 17 Nov 2024 05:40 )    Color: x / Appearance: x / SG: x / pH: x  Gluc: 109 mg/dL / Ketone: x  / Bili: x / Urobili: x   Blood: x / Protein: x / Nitrite: x   Leuk Esterase: x / RBC: x / WBC x   Sq Epi: x / Non Sq Epi: x / Bacteria: x        CAPILLARY BLOOD GLUCOSE            RADIOLOGY & ADDITIONAL TESTS:  Results Reviewed:   Imaging Personally Reviewed:  Electrocardiogram Personally Reviewed:

## 2024-11-17 NOTE — PROGRESS NOTE ADULT - ASSESSMENT
69-year-old female with a history of iron deficiency anemia, AVMs, peripheral artery disease who presents status post vascular intervention with stent placement and thrombectomy    Chronic Iron deficiency anemia  Given her post procedural state, this degree of anemia is multifactorial   She does not have evidence of overt GI bleeding to explain her profound drop in hemoglobin.  Continue regular diet today  NPO after midnight for repeat EGD/enteroscopy   Continue PPI once a day  Continue to avoid NSAIDs  Recommend transfusing 1 unit of PRBCs today to obtain a hemoglobin of greater than 8 prior to her procedure given her cardiovascular disease

## 2024-11-18 ENCOUNTER — TRANSCRIPTION ENCOUNTER (OUTPATIENT)
Age: 69
End: 2024-11-18

## 2024-11-18 LAB
ANION GAP SERPL CALC-SCNC: 13 MMOL/L — SIGNIFICANT CHANGE UP (ref 5–17)
BUN SERPL-MCNC: 6 MG/DL — LOW (ref 8–20)
CALCIUM SERPL-MCNC: 8.9 MG/DL — SIGNIFICANT CHANGE UP (ref 8.4–10.5)
CHLORIDE SERPL-SCNC: 105 MMOL/L — SIGNIFICANT CHANGE UP (ref 96–108)
CO2 SERPL-SCNC: 23 MMOL/L — SIGNIFICANT CHANGE UP (ref 22–29)
CREAT SERPL-MCNC: 0.45 MG/DL — LOW (ref 0.5–1.3)
EGFR: 103 ML/MIN/1.73M2 — SIGNIFICANT CHANGE UP
GLUCOSE SERPL-MCNC: 102 MG/DL — HIGH (ref 70–99)
HCT VFR BLD CALC: 29.1 % — LOW (ref 34.5–45)
HGB BLD-MCNC: 9.6 G/DL — LOW (ref 11.5–15.5)
INR BLD: 0.94 RATIO — SIGNIFICANT CHANGE UP (ref 0.85–1.16)
MCHC RBC-ENTMCNC: 27.9 PG — SIGNIFICANT CHANGE UP (ref 27–34)
MCHC RBC-ENTMCNC: 33 G/DL — SIGNIFICANT CHANGE UP (ref 32–36)
MCV RBC AUTO: 84.6 FL — SIGNIFICANT CHANGE UP (ref 80–100)
MRSA PCR RESULT.: SIGNIFICANT CHANGE UP
PLATELET # BLD AUTO: 260 K/UL — SIGNIFICANT CHANGE UP (ref 150–400)
POTASSIUM SERPL-MCNC: 4.1 MMOL/L — SIGNIFICANT CHANGE UP (ref 3.5–5.3)
POTASSIUM SERPL-SCNC: 4.1 MMOL/L — SIGNIFICANT CHANGE UP (ref 3.5–5.3)
PROTHROM AB SERPL-ACNC: 10.6 SEC — SIGNIFICANT CHANGE UP (ref 9.9–13.4)
RBC # BLD: 3.44 M/UL — LOW (ref 3.8–5.2)
RBC # FLD: 15.1 % — HIGH (ref 10.3–14.5)
S AUREUS DNA NOSE QL NAA+PROBE: SIGNIFICANT CHANGE UP
SODIUM SERPL-SCNC: 141 MMOL/L — SIGNIFICANT CHANGE UP (ref 135–145)
WBC # BLD: 8.26 K/UL — SIGNIFICANT CHANGE UP (ref 3.8–10.5)
WBC # FLD AUTO: 8.26 K/UL — SIGNIFICANT CHANGE UP (ref 3.8–10.5)

## 2024-11-18 PROCEDURE — 43270 EGD LESION ABLATION: CPT

## 2024-11-18 PROCEDURE — 99232 SBSQ HOSP IP/OBS MODERATE 35: CPT

## 2024-11-18 RX ORDER — CHLORHEXIDINE GLUCONATE 1.2 MG/ML
1 RINSE ORAL DAILY
Refills: 0 | Status: DISCONTINUED | OUTPATIENT
Start: 2024-11-18 | End: 2024-11-19

## 2024-11-18 RX ORDER — LOSARTAN POTASSIUM 100 MG/1
50 TABLET, FILM COATED ORAL DAILY
Refills: 0 | Status: DISCONTINUED | OUTPATIENT
Start: 2024-11-18 | End: 2024-11-19

## 2024-11-18 RX ORDER — LEVOTHYROXINE SODIUM 150 MCG
1 TABLET ORAL
Qty: 0 | Refills: 0 | DISCHARGE
Start: 2024-11-18

## 2024-11-18 RX ORDER — AMLODIPINE BESYLATE 10 MG/1
10 TABLET ORAL DAILY
Refills: 0 | Status: DISCONTINUED | OUTPATIENT
Start: 2024-11-18 | End: 2024-11-19

## 2024-11-18 RX ADMIN — SERTRALINE HYDROCHLORIDE 150 MILLIGRAM(S): 100 TABLET, FILM COATED ORAL at 13:53

## 2024-11-18 RX ADMIN — NICOTINE 1 PATCH: 21 PATCH, EXTENDED RELEASE TRANSDERMAL at 07:30

## 2024-11-18 RX ADMIN — Medication 81 MILLIGRAM(S): at 13:52

## 2024-11-18 RX ADMIN — PANTOPRAZOLE SODIUM 40 MILLIGRAM(S): 40 TABLET, DELAYED RELEASE ORAL at 04:59

## 2024-11-18 RX ADMIN — AMLODIPINE BESYLATE 10 MILLIGRAM(S): 10 TABLET ORAL at 14:54

## 2024-11-18 RX ADMIN — ROSUVASTATIN CALCIUM 40 MILLIGRAM(S): 5 TABLET, FILM COATED ORAL at 21:22

## 2024-11-18 RX ADMIN — CHLORHEXIDINE GLUCONATE 1 APPLICATION(S): 1.2 RINSE ORAL at 13:51

## 2024-11-18 RX ADMIN — NICOTINE 1 PATCH: 21 PATCH, EXTENDED RELEASE TRANSDERMAL at 13:51

## 2024-11-18 RX ADMIN — CLOPIDOGREL 75 MILLIGRAM(S): 75 TABLET, FILM COATED ORAL at 13:52

## 2024-11-18 RX ADMIN — LOSARTAN POTASSIUM 50 MILLIGRAM(S): 100 TABLET, FILM COATED ORAL at 17:27

## 2024-11-18 RX ADMIN — ACETAMINOPHEN, DIPHENHYDRAMINE HCL, PHENYLEPHRINE HCL 3 MILLIGRAM(S): 325; 25; 5 TABLET ORAL at 00:00

## 2024-11-18 RX ADMIN — NICOTINE 1 PATCH: 21 PATCH, EXTENDED RELEASE TRANSDERMAL at 19:00

## 2024-11-18 RX ADMIN — PANTOPRAZOLE SODIUM 40 MILLIGRAM(S): 40 TABLET, DELAYED RELEASE ORAL at 17:27

## 2024-11-18 RX ADMIN — ACETAMINOPHEN, DIPHENHYDRAMINE HCL, PHENYLEPHRINE HCL 3 MILLIGRAM(S): 325; 25; 5 TABLET ORAL at 23:11

## 2024-11-18 NOTE — PROGRESS NOTE ADULT - SUBJECTIVE AND OBJECTIVE BOX
Department of Cardiology                                                                  [facility]/[place] [id number]                                                            Telephone: [phone]. Fax:[phone]                                                                                      Cardiac Cath NP Note           Patient is a 69y old  Female who presents with a chief complaint of vascular procedure (2024 10:04)    Interval events    Patient hemodynamically stable Hb/hct stable after total of 3units of PRBc transfusions, NV status intact     Pt seen and examined. s/p 1u prbc so far. Denies HA, CP, SOB, abd pain, n/v, LE pain/numbness.      PAST MEDICAL & SURGICAL HISTORY:  HTN (hypertension)  x 15 years, controlled      Depression      Graves disease      Hyperlipidemia      S/P  section      S/P tubal ligation          RADIOLOGY & ADDITIONAL STUDIES/DIAGNOSTIC TESTING:      < from: US Duplex Arterial Lower Ext Compl, Bilateral (11.15.24 @ 17:54) >    IMPRESSION: Hemodynamically significant stenosis in the right common   femoral artery.    --- End of Report ---    < end of copied text >          Allergies    No Known Allergies    Intolerances    Naprosyn (Other; Stomach Upset)  aspirin (Other; Stomach Upset)      MEDICATIONS  (STANDING):  aspirin  chewable 81 milliGRAM(s) Oral daily  chlorhexidine 4% Liquid 1 Application(s) Topical Once  clopidogrel Tablet 75 milliGRAM(s) Oral daily  influenza  Vaccine (HIGH DOSE) 0.5 milliLiter(s) IntraMuscular once  levothyroxine 112 MICROGram(s) Oral daily  nicotine -  14 mG/24Hr(s) Patch 1 Patch Transdermal daily  pantoprazole  Injectable 40 milliGRAM(s) IV Push two times a day  rosuvastatin 40 milliGRAM(s) Oral at bedtime  sertraline 150 milliGRAM(s) Oral daily    MEDICATIONS  (PRN):  melatonin 3 milliGRAM(s) Oral at bedtime PRN Insomnia  nicotine  Polacrilex Lozenge 4 milliGRAM(s) Oral every 3 hours PRN craving        REVIEW OF SYSTEMS:  General: No fevers/chills, or fatigue  HEENT: No visual disturbances, no hearing loss, no headaches, no epistaxis.  CV: No chest pain,no palpitations, no edema, no orthopnea, no PND, or KELLY, c/o mild left groin tenderness  Respiratory: No dyspnea, no wheeze, no cough.  GI: no nausea/vomiting, no black/bloody stools.  : No hematuria  Musculoskeletal: No myalgias, no arthralgias, no back pain.    Vital Signs Last 24 Hrs  T(C): 36.7 (2024 07:52), Max: 36.9 (2024 12:02)  T(F): 98 (2024 07:52), Max: 98.4 (2024 12:02)  HR: 70 (2024 08:00) (61 - 91)  BP: 154/68 (2024 08:00) (132/67 - 173/74)  BP(mean): 95 (2024 08:00) (86 - 104)  RR: 18 (2024 08:00) (16 - 20)  SpO2: 99% (2024 08:00) (96% - 100%)    Parameters below as of 2024 08:00  Patient On (Oxygen Delivery Method): room air        Daily     Daily     I&O's Detail    2024 07:01  -  2024 07:00  --------------------------------------------------------  IN:    Oral Fluid: 450 mL  Total IN: 450 mL    OUT:    Stool (mL): 1 mL  Total OUT: 1 mL    Total NET: 449 mL          PHYSICAL EXAM:   GENERAL: Pt lying comfortably, NAD.  ENMT: PERRL, +EOMI.  NECK: soft, Supple, No JVD,   CHEST/LUNG: Clear to auscultatation bilaterally; No wheezing.  HEART: S1S2+, Regular rate and rhythm; No murmurs.  ABDOMEN: Soft, Nontender, Nondistended; Bowel sounds present.  MUSCULOSKELETAL: Normal range of motion.  SKIN: No rashes or lesions.  NEURO: AAOX3, no focal deficits, no motor r sensory loss.  PSYCH: normal mood.  VASCULAR:   Radial +2 R/+2 L  Femoral +2 R/+2 L  PT Dopplerable B/L  DP Dopplerable B/L      INTERPRETATION OF TELEMETRY:      LABS:                        9.6    8.26  )-----------( 260      ( 2024 05:35 )             29.1     11-18    141  |  105  |  6.0[L]  ----------------------------<  102[H]  4.1   |  23.0  |  0.45[L]    Ca    8.9      2024 05:35          PT/INR - ( 2024 05:35 )   PT: 10.6 sec;   INR: 0.94 ratio           Urinalysis Basic - ( 2024 05:35 )    Color: x / Appearance: x / SG: x / pH: x  Gluc: 102 mg/dL / Ketone: x  / Bili: x / Urobili: x   Blood: x / Protein: x / Nitrite: x   Leuk Esterase: x / RBC: x / WBC x   Sq Epi: x / Non Sq Epi: x / Bacteria: x      I&O's Summary    2024 07:01  -  2024 07:00  --------------------------------------------------------  IN: 450 mL / OUT: 1 mL / NET: 449 mL

## 2024-11-18 NOTE — PROGRESS NOTE ADULT - SUBJECTIVE AND OBJECTIVE BOX
Patient is a 69y old  Female who presents with a chief complaint of PAD  For pripheral angio     chart reviewed       ALLERGIES:  Naprosyn (Other; Stomach Upset)  aspirin (Other; Stomach Upset)  No Known Allergies    MEDICATIONS  (STANDING):  aspirin  chewable 81 milliGRAM(s) Oral daily  chlorhexidine 2% Cloths 1 Application(s) Topical daily  chlorhexidine 4% Liquid 1 Application(s) Topical Once  clopidogrel Tablet 75 milliGRAM(s) Oral daily  influenza  Vaccine (HIGH DOSE) 0.5 milliLiter(s) IntraMuscular once  levothyroxine 112 MICROGram(s) Oral daily  nicotine -  14 mG/24Hr(s) Patch 1 Patch Transdermal daily  pantoprazole  Injectable 40 milliGRAM(s) IV Push two times a day  rosuvastatin 40 milliGRAM(s) Oral at bedtime  sertraline 150 milliGRAM(s) Oral daily    MEDICATIONS  (PRN):  melatonin 3 milliGRAM(s) Oral at bedtime PRN Insomnia  nicotine  Polacrilex Lozenge 4 milliGRAM(s) Oral every 3 hours PRN craving    Vital Signs Last 24 Hrs  T(F): 98 (18 Nov 2024 07:52), Max: 98.4 (17 Nov 2024 12:02)  HR: 70 (18 Nov 2024 08:00) (61 - 91)  BP: 154/68 (18 Nov 2024 08:00) (132/67 - 173/74)  RR: 18 (18 Nov 2024 08:00) (16 - 20)  SpO2: 99% (18 Nov 2024 08:00) (96% - 100%)  I&O's Summary    17 Nov 2024 07:01  -  18 Nov 2024 07:00  --------------------------------------------------------  IN: 450 mL / OUT: 1 mL / NET: 449 mL        PHYSICAL EXAM:  General:   ENT:   Neck:   Lungs:   Cardio: RRR, S1/S2, No murmur  Abdomen: Soft, Nontender, Nondistended; Bowel sounds present  Extremities: No calf tenderness, No pitting edema    LABS:                        9.6    8.26  )-----------( 260      ( 18 Nov 2024 05:35 )             29.1     11-18    141  |  105  |  6.0  ----------------------------<  102  4.1   |  23.0  |  0.45    Ca    8.9      18 Nov 2024 05:35  Phos  2.8     11-16  Mg     2.1     11-16    TPro  5.5  /  Alb  3.3  /  TBili  0.3  /  DBili  0.1  /  AST  23  /  ALT  7   /  AlkPhos  53  11-16          PT/INR - ( 18 Nov 2024 05:35 )   PT: 10.6 sec;   INR: 0.94 ratio           Lactate, Blood: 1.0 mmol/L (11-15 @ 14:24)      CARDIAC MARKERS ( 15 Nov 2024 14:24 )  x     / x     / x     / x     / 5.8 ng/mL      11-15 Chol 118 mg/dL LDL -- HDL 57 mg/dL Trig 76 mg/dL                  Urinalysis Basic - ( 18 Nov 2024 05:35 )    Color: x / Appearance: x / SG: x / pH: x  Gluc: 102 mg/dL / Ketone: x  / Bili: x / Urobili: x   Blood: x / Protein: x / Nitrite: x   Leuk Esterase: x / RBC: x / WBC x   Sq Epi: x / Non Sq Epi: x / Bacteria: x          RADIOLOGY & ADDITIONAL TESTS:       Patient is a 69y old  Female who presents with a chief complaint of PAD  For peripheral angio     chart reviewed   pt is seen around 2 pm   she is feeling well , no complaints   no overnight events \EGD result reviewed     BP is high             ALLERGIES:  Naprosyn (Other; Stomach Upset)  aspirin (Other; Stomach Upset)  No Known Allergies    MEDICATIONS  (STANDING):  aspirin  chewable 81 milliGRAM(s) Oral daily  chlorhexidine 2% Cloths 1 Application(s) Topical daily  chlorhexidine 4% Liquid 1 Application(s) Topical Once  clopidogrel Tablet 75 milliGRAM(s) Oral daily  influenza  Vaccine (HIGH DOSE) 0.5 milliLiter(s) IntraMuscular once  levothyroxine 112 MICROGram(s) Oral daily  nicotine -  14 mG/24Hr(s) Patch 1 Patch Transdermal daily  pantoprazole  Injectable 40 milliGRAM(s) IV Push two times a day  rosuvastatin 40 milliGRAM(s) Oral at bedtime  sertraline 150 milliGRAM(s) Oral daily    MEDICATIONS  (PRN):  melatonin 3 milliGRAM(s) Oral at bedtime PRN Insomnia  nicotine  Polacrilex Lozenge 4 milliGRAM(s) Oral every 3 hours PRN craving    Vital Signs Last 24 Hrs  T(F): 98 (18 Nov 2024 07:52), Max: 98.4 (17 Nov 2024 12:02)  HR: 70 (18 Nov 2024 08:00) (61 - 91)  BP: 154/68 (18 Nov 2024 08:00) (132/67 - 173/74)  RR: 18 (18 Nov 2024 08:00) (16 - 20)  SpO2: 99% (18 Nov 2024 08:00) (96% - 100%)  I&O's Summary    17 Nov 2024 07:01  -  18 Nov 2024 07:00  --------------------------------------------------------  IN: 450 mL / OUT: 1 mL / NET: 449 mL        PHYSICAL EXAM:  General: awake   Neck: supple   Lungs: CTA   Cardio: RRR, S1/S2, No murmur  Abdomen: Soft, Nontender, Nondistended; Bowel sounds present  Extremities: No calf tenderness, No pitting edema    LABS:                        9.6    8.26  )-----------( 260      ( 18 Nov 2024 05:35 )             29.1     11-18    141  |  105  |  6.0  ----------------------------<  102  4.1   |  23.0  |  0.45    Ca    8.9      18 Nov 2024 05:35  Phos  2.8     11-16  Mg     2.1     11-16    TPro  5.5  /  Alb  3.3  /  TBili  0.3  /  DBili  0.1  /  AST  23  /  ALT  7   /  AlkPhos  53  11-16          PT/INR - ( 18 Nov 2024 05:35 )   PT: 10.6 sec;   INR: 0.94 ratio           Lactate, Blood: 1.0 mmol/L (11-15 @ 14:24)      CARDIAC MARKERS ( 15 Nov 2024 14:24 )  x     / x     / x     / x     / 5.8 ng/mL      11-15 Chol 118 mg/dL LDL -- HDL 57 mg/dL Trig 76 mg/dL                  Urinalysis Basic - ( 18 Nov 2024 05:35 )    Color: x / Appearance: x / SG: x / pH: x  Gluc: 102 mg/dL / Ketone: x  / Bili: x / Urobili: x   Blood: x / Protein: x / Nitrite: x   Leuk Esterase: x / RBC: x / WBC x   Sq Epi: x / Non Sq Epi: x / Bacteria: x          RADIOLOGY & ADDITIONAL TESTS:

## 2024-11-18 NOTE — PROGRESS NOTE ADULT - ASSESSMENT
Assessment and Plan:   · Assessment	  70y/o female smoker with history of coronary artery disease, PCI 2/2024 LAD, multilevel PAD, R iliac intervention 2/2024, COPD, HTN, HLD, carotid and aortic disease, prediabetes. She states that she get left lower leg numbness and pain at rest, and right lower leg cramping when walking a few blocks, which is improved since PTA of the right LAUREL stent.   BOLA on 09/24 revealed  BOLA of 0.40. Mildly abnormal ankle waveforms. No significant changes post exercise. very faint digit waveform TBI not obtainable.  Patient underwent Peripheral Angiography and angioplasty and stent of the left iliac, SFA, and popliteal via RFA. Post procedure, patient complain of RLE pain and numbness, and unable to move foot. No signal DP/PT pulses via RLE so RFA sheath was removed emergently and patient taken back to lab. Repeat peripheral angiogram via LFA revealed a right CFA thrombus in which thrombectomy and balloon angioplasty were performed. LFA sheath pulled with no bleeding or hematoma. Course c/b significant anemia h/h 5.9/18.4 and patient with complaints of dizziness and episodes of hypotension.   11/15: 2 units prbc were transfused, IV vebefer x1 dose   CT abdomen/ plv performed No significant retroperitoneal hematomas. Trace infiltration/small hemorrhagic fluid in the left groin/sec left external iliac region.   11/16: Repeat CBC  AM 7.5/23.1.patient ambulated without complaints, received 2nd dose of IV venefer  11/17: Hb/hct 7.2/23 received 1 unit of PRBc transfusion, NV sttaus intact  Patient was seen by GI and plan for Enteroscopy today  11/18: hb/hct 9.6/26.1     Patient is tolerating ambulating around room this AM without complaints of dizziness, palpitations or lower extremity pain, she reports mild groin tenderness. She has also received IV venofer yesterday. She denies chest pain, chest pressure, shortness of breath, palpitations, dizziness, abdominal pain, back pain or b/l LE pain. b/l groin sites stable w/o active bleeding or hematoma. b/l LE acyanotic; warm to touch; with + DP/ PT signals.     PROBLRM  # Severe PAD/ S/P Peripheral angio with angioplasty and stent of the left iliac, SFA, and popliteal./CFA thrombus aspiration    PLAN    -POD 4 of  Peripheral Angiography and angioplasty and stent of the left iliac, SFA, and popliteal., Vascular Access: RFA  -Post angiogram,  Patient c/o RLE numbness and pain, unable to move foot. Unable to obtain doppler DP or PT pulses. Patient taken back to [facility] 11/14 for peripheral angiogram via LFA revealing right CFA thrombus s/p thrombectomy  -B/L Groin benign, no bleeding or hematoma, B/L LE acyanotic; warm to touch; +sensation; +DP/PT signals with doppler   -US of B/L LE arterial  revealed Hemodynamically significant stenosis in the right common FEMORAL ARTERY , NO hematoma or pseudo  Continue DAPT with aspirin and plavix without interruptions  -DAPT compliancy reinforced with patient  -Continue statin with rosuvastatin 40mg PO QHS  -CT abdomen/ PLV:  No significant retroperitoneal hematomas. Trace infiltration/small hemorrhagic fluid in the left groin/sec left external iliac region.  -Notify interventional cardiology team immediately if patient develops any increase in groin tenderness, groin swelling or any active bleeding or hematoma at groin sites.   -Patient to follow up with Dr. Wai Agrawal in 1 week post discharge.     2. Acute Anemia.  -h/h 11.6/35.8 -> 5.9/18.4 (11/15) s/p 2 units prbc -> 7.5/ 23.1 (11/16)  -prolonged procedure time in CCL ~ 7hours and had repeat peripheral angiogram same day for RCFA thrombectomyPatient spent prolonged time in cath lab for procedure (close to 7 hours) , and 2 hours for repeat procedure of CFA  -Hx of AVMs and avm cauterizations  in the past, JASSI, she gets IV iron in every few weeks   - s/ptotal 3 units of PRBC transfusion  -CT abdomen/ PLV:  No significant retroperitoneal hematomas. Trace infiltration/small hemorrhagic fluid in the left groin/sec left external iliac region.  - IV Venofer dosex2   - oral iron supplements upon d/c  -stool OB positive   -GI planning  for repeat EGD/enteroscopy on Monday to determine if she has additional AVMs that need to be ablated  OP Hematology follow up in 1 week with  -Plan updated  with patient and she verbalized understanding  -Discussed with DR Agrawal  Assessment and Plan:   · Assessment	  68y/o female smoker with history of coronary artery disease, PCI 2/2024 LAD, multilevel PAD, R iliac intervention 2/2024, COPD, HTN, HLD, carotid and aortic disease, prediabetes. She states that she get left lower leg numbness and pain at rest, and right lower leg cramping when walking a few blocks, which is improved since PTA of the right LAUREL stent.   BOLA on 09/24 revealed  BOLA of 0.40. Mildly abnormal ankle waveforms. No significant changes post exercise. very faint digit waveform TBI not obtainable.  Patient underwent Peripheral Angiography and angioplasty and stent of the left iliac, SFA, and popliteal via RFA. Post procedure, patient complain of RLE pain and numbness, and unable to move foot. No signal DP/PT pulses via RLE so RFA sheath was removed emergently and patient taken back to lab. Repeat peripheral angiogram via LFA revealed a right CFA thrombus in which thrombectomy and balloon angioplasty were performed. LFA sheath pulled with no bleeding or hematoma. Course c/b significant anemia h/h 5.9/18.4 and patient with complaints of dizziness and episodes of hypotension.   11/15: 2 units prbc were transfused, IV vebefer x1 dose   CT abdomen/ plv performed No significant retroperitoneal hematomas. Trace infiltration/small hemorrhagic fluid in the left groin/sec left external iliac region.   11/16: Repeat CBC  AM 7.5/23.1.patient ambulated without complaints, received 2nd dose of IV venefer  11/17: Hb/hct 7.2/23 received 1 unit of PRBc transfusion, NV sttaus intact  Patient was seen by GI and plan for Enteroscopy today  11/18: hb/hct 9.6/26.1     Patient is tolerating ambulating around room this AM without complaints of dizziness, palpitations or lower extremity pain, she reports mild groin tenderness. She has also received IV venofer yesterday. She denies chest pain, chest pressure, shortness of breath, palpitations, dizziness, abdominal pain, back pain or b/l LE pain. b/l groin sites stable w/o active bleeding or hematoma. b/l LE acyanotic; warm to touch; with + DP/ PT signals.     PROBLRM  # Severe PAD/ S/P Peripheral angio with angioplasty and stent of the left iliac, SFA, and popliteal./CFA thrombus aspiration    PLAN    -POD 4 of  Peripheral Angiography and angioplasty and stent of the left iliac, SFA, and popliteal., Vascular Access: RFA  -Post angiogram,  Patient c/o RLE numbness and pain, unable to move foot. Unable to obtain doppler DP or PT pulses. Patient taken back to [facility] 11/14 for peripheral angiogram via LFA revealing right CFA thrombus s/p thrombectomy  -B/L Groin benign, no bleeding or hematoma, B/L LE acyanotic; warm to touch; +sensation; +DP/PT signals with doppler   -US of B/L LE arterial  revealed Hemodynamically significant stenosis in the right common FEMORAL ARTERY , NO hematoma or pseudo  Continue DAPT with aspirin and plavix without interruptions  -DAPT compliancy reinforced with patient  -Continue statin with rosuvastatin 40mg PO QHS  -CT abdomen/ PLV:  No significant retroperitoneal hematomas. Trace infiltration/small hemorrhagic fluid in the left groin/sec left external iliac region.  -Notify interventional cardiology team immediately if patient develops any increase in groin tenderness, groin swelling or any active bleeding or hematoma at groin sites.   -Patient to follow up with Dr. Wai Agrawal in 1 week post discharge.     2. Acute Anemia.  -h/h 11.6/35.8 -> 5.9/18.4 (11/15) s/p 2 units prbc -> 7.5/ 23.1 (11/16)  -prolonged procedure time in CCL ~ 7hours and had repeat peripheral angiogram same day for RCFA thrombectomyPatient spent prolonged time in cath lab for procedure (close to 7 hours) , and 2 hours for repeat procedure of CFA  -Hx of AVMs and avm cauterizations  in the past, JASSI, she gets IV iron in every few weeks   - s/ptotal 3 units of PRBC transfusion  -CT abdomen/ PLV:  No significant retroperitoneal hematomas. Trace infiltration/small hemorrhagic fluid in the left groin/sec left external iliac region.  - IV Venofer dosex2   - oral iron supplements upon d/c  -stool OB positive   -GI planning  for repeat EGD/enteroscopy on Monday to determine if she has additional AVMs that need to be ablated  OP Hematology follow up in 1 week   -  Patient claered for discharge from interventional stand point conditionally GI and hospitalist clears the pt, no more active interventions from interventional perspective  -Plan updated  with patient and she verbalized understanding  -Discussed with DR Agrawal

## 2024-11-18 NOTE — PROGRESS NOTE ADULT - ASSESSMENT
69y/oF smoker PMH CAD, PCI 2/2024 LAD, multilevel PAD, R iliac intervention 2/2024, COPD, HTN, HLD, carotid and aortic disease, prediabetes. She states that she get left lower leg numbness and pain at rest, and right lower leg cramping when walking a few blocks, which is improved since PTA of the right LAUREL stent. She denies chest pain or SOB, although she states she does not exert herself too much do to her leg pain.   Pt now s/p peripheral angio and angioplasty with stent to L iliac, SFA and popliteal arteries 11/14.  Pt then c/o RLE numbness, pain, with inability to move foot and unobtainable DP or PT pulses with doppler. Sheath pulled emergently, taken emergently back to cath lab for angio, which revealed R CFA thrombus. Thrombectomy and balloon angio performed, started on heparin gtt, which remained o/n. In am, labs significant for drop in Hb to 5.9, also hypotensive. 2u PRBC ordered. CT a/p without RP bleed. Hypotension improving. MICU consulted, deemed stable to remain on floor.     1-Severe PAD   s/p peripheral angio with angioplasty and stent of L iliac, SFA and popliteal arteries   s/p R CFA thrombus aspiration   -cont DAPT   -cont statin   -cont neurovascular checks   -BLE precautions as per interventional cardio     2-Acute on chronic anemia /Iron deficiency anemia   Hx AVMs   -gets regular iron infusions   -CT a/p without RP hematoma, +trace infiltration/small hemorrhagic fluid in L groin extending to L external iliac region, no sig hematoma   -s/p 2u PRBC   -No signs of overt GI bleed  -Regular diet, EGD performed with esophagitis   cont PPI bid   -Monitor H/H    3- HTN   high   home meds on hold   will resume amlodipine and losartan now     4- Hyperlipidemia   -cont statin      5-Tobacco dependence   -nicotine patch   ordered counseling   -cessation advised     dispo home in 24 hours if hb and BP stable

## 2024-11-19 ENCOUNTER — APPOINTMENT (OUTPATIENT)
Dept: INFUSION THERAPY | Facility: CANCER CENTER | Age: 69
End: 2024-11-19

## 2024-11-19 ENCOUNTER — TRANSCRIPTION ENCOUNTER (OUTPATIENT)
Age: 69
End: 2024-11-19

## 2024-11-19 VITALS
OXYGEN SATURATION: 96 % | RESPIRATION RATE: 18 BRPM | SYSTOLIC BLOOD PRESSURE: 136 MMHG | TEMPERATURE: 98 F | HEART RATE: 63 BPM | DIASTOLIC BLOOD PRESSURE: 71 MMHG

## 2024-11-19 LAB
HCT VFR BLD CALC: 29.1 % — LOW (ref 34.5–45)
HGB BLD-MCNC: 9.6 G/DL — LOW (ref 11.5–15.5)
MCHC RBC-ENTMCNC: 28.2 PG — SIGNIFICANT CHANGE UP (ref 27–34)
MCHC RBC-ENTMCNC: 33 G/DL — SIGNIFICANT CHANGE UP (ref 32–36)
MCV RBC AUTO: 85.6 FL — SIGNIFICANT CHANGE UP (ref 80–100)
PLATELET # BLD AUTO: 291 K/UL — SIGNIFICANT CHANGE UP (ref 150–400)
RBC # BLD: 3.4 M/UL — LOW (ref 3.8–5.2)
RBC # FLD: 15.9 % — HIGH (ref 10.3–14.5)
WBC # BLD: 7.52 K/UL — SIGNIFICANT CHANGE UP (ref 3.8–10.5)
WBC # FLD AUTO: 7.52 K/UL — SIGNIFICANT CHANGE UP (ref 3.8–10.5)

## 2024-11-19 PROCEDURE — 99239 HOSP IP/OBS DSCHRG MGMT >30: CPT

## 2024-11-19 PROCEDURE — 99232 SBSQ HOSP IP/OBS MODERATE 35: CPT

## 2024-11-19 RX ORDER — LOSARTAN POTASSIUM 100 MG/1
1 TABLET, FILM COATED ORAL
Qty: 30 | Refills: 0
Start: 2024-11-19 | End: 2024-12-18

## 2024-11-19 RX ORDER — PANTOPRAZOLE SODIUM 40 MG/1
1 TABLET, DELAYED RELEASE ORAL
Qty: 60 | Refills: 0
Start: 2024-11-19 | End: 2024-12-18

## 2024-11-19 RX ORDER — LEVOTHYROXINE SODIUM 150 MCG
1 TABLET ORAL
Refills: 0 | DISCHARGE

## 2024-11-19 RX ORDER — NICOTINE 21 MG/24H
1 PATCH, EXTENDED RELEASE TRANSDERMAL
Qty: 14 | Refills: 0
Start: 2024-11-19 | End: 2024-12-02

## 2024-11-19 RX ADMIN — Medication 112 MICROGRAM(S): at 05:20

## 2024-11-19 RX ADMIN — CHLORHEXIDINE GLUCONATE 1 APPLICATION(S): 1.2 RINSE ORAL at 11:10

## 2024-11-19 RX ADMIN — LOSARTAN POTASSIUM 50 MILLIGRAM(S): 100 TABLET, FILM COATED ORAL at 05:21

## 2024-11-19 RX ADMIN — SERTRALINE HYDROCHLORIDE 150 MILLIGRAM(S): 100 TABLET, FILM COATED ORAL at 11:10

## 2024-11-19 RX ADMIN — CLOPIDOGREL 75 MILLIGRAM(S): 75 TABLET, FILM COATED ORAL at 11:10

## 2024-11-19 RX ADMIN — NICOTINE 1 PATCH: 21 PATCH, EXTENDED RELEASE TRANSDERMAL at 11:10

## 2024-11-19 RX ADMIN — AMLODIPINE BESYLATE 10 MILLIGRAM(S): 10 TABLET ORAL at 05:20

## 2024-11-19 RX ADMIN — PANTOPRAZOLE SODIUM 40 MILLIGRAM(S): 40 TABLET, DELAYED RELEASE ORAL at 05:20

## 2024-11-19 RX ADMIN — Medication 81 MILLIGRAM(S): at 11:10

## 2024-11-19 NOTE — PROGRESS NOTE ADULT - SUBJECTIVE AND OBJECTIVE BOX
Chief Complaint:  Patient is a 69y old  Female who presents with a chief complaint of PAD, For peripheral angio (2024 08:34)      HPI/ 24 hr events: Patient seen and examined at bedside. S/p Enteroscopy (24) with AVMs in duodenum and jejunum, treated with APC. Pt feeling well this morning. Tolerating diet. Had brown BM.  Denies nausea, vomiting, diarrhea, abdominal pain, hematemesis, hematochezia, melena. Vitals are overall stable, hgb stable at 9.6.    REVIEW OF SYSTEMS:   General: Negative  HEENT: Negative  CV: Negative  Respiratory: Negative  GI: See HPI  : Negative  MSK: Negative  Hematologic: Negative  Skin: Negative    MEDICATIONS:   MEDICATIONS  (STANDING):  amLODIPine   Tablet 10 milliGRAM(s) Oral daily  aspirin  chewable 81 milliGRAM(s) Oral daily  chlorhexidine 2% Cloths 1 Application(s) Topical daily  chlorhexidine 4% Liquid 1 Application(s) Topical Once  clopidogrel Tablet 75 milliGRAM(s) Oral daily  influenza  Vaccine (HIGH DOSE) 0.5 milliLiter(s) IntraMuscular once  levothyroxine 112 MICROGram(s) Oral daily  losartan 50 milliGRAM(s) Oral daily  nicotine -  14 mG/24Hr(s) Patch 1 Patch Transdermal daily  pantoprazole  Injectable 40 milliGRAM(s) IV Push two times a day  rosuvastatin 40 milliGRAM(s) Oral at bedtime  sertraline 150 milliGRAM(s) Oral daily    MEDICATIONS  (PRN):  melatonin 3 milliGRAM(s) Oral at bedtime PRN Insomnia  nicotine  Polacrilex Lozenge 4 milliGRAM(s) Oral every 3 hours PRN craving      Packed Red Cells Order:  1 Unit  Indication: Anemia due to Active Hemorrhage - Medical Conditions e.  Infuse Unit : 3 Hours (24 @ 10:13)  Packed Red Cells Order:  1 Unit  Indication: Hgb <7 gm/dL  Infuse Unit : 3 Hours (11-15-24 @ 06:54)  Packed Red Cells Order:  1 Unit  Indication: Hgb <7 gm/dL  Infuse Unit : 3 Hours (11-15-24 @ 06:54)        DIET:  Diet, Regular (24 @ 12:20) [Active]          ALLERGIES:   Allergies    No Known Allergies    Intolerances    Naprosyn (Other; Stomach Upset)  aspirin (Other; Stomach Upset)      VITAL SIGNS:   Vital Signs Last 24 Hrs  T(C): 36.7 (2024 08:10), Max: 37.4 (2024 00:26)  T(F): 98.1 (2024 08:10), Max: 99.3 (2024 00:26)  HR: 69 (2024 08:10) (60 - 75)  BP: 108/69 (2024 08:10) (108/69 - 169/82)  BP(mean): 108 (2024 16:00) (89 - 108)  RR: 18 (2024 08:10) (16 - 18)  SpO2: 97% (2024 08:10) (95% - 99%)    Parameters below as of 2024 08:10  Patient On (Oxygen Delivery Method): room air      I&O's Summary    2024 07:01  -  2024 07:00  --------------------------------------------------------  IN: 480 mL / OUT: 0 mL / NET: 480 mL        PHYSICAL EXAM:   GENERAL:  No acute distress  HEENT:  NC/AT, conjunctiva clear, sclera anicteric  CHEST:  No increased effort  HEART:  Regular rate  ABDOMEN:  Soft, non-tender, non-distended, normoactive bowel sounds, no rebound or guarding  EXTREMITIES: No edema  SKIN:  Warm, dry  NEURO:  Calm, cooperative    LABS:                        9.6    7.52  )-----------( 291      ( 2024 05:16 )             29.1     Hemoglobin: 9.6 g/dL (24 @ 05:16)  Hemoglobin: 9.6 g/dL (24 @ 05:35)  Hemoglobin: 7.3 g/dL (24 @ 05:40)  Hemoglobin: 7.5 g/dL (24 @ 14:10)        141  |  105  |  6.0[L]  ----------------------------<  102[H]  4.1   |  23.0  |  0.45[L]    Ca    8.9      2024 05:35          PT/INR - ( 2024 05:35 )   PT: 10.6 sec;   INR: 0.94 ratio         RADIOLOGY & ADDITIONAL STUDIES:        < from: Enteroscopy (24 @ 09:31) >    Enteroscopy Report        Date: 2024 9:31 AM        Patient Name: MARY ARREGUIN        MRN: [id number]        Account Number:        [id number]        Gender: Female         (age): 1955 (69)        Instrument(s):        Packetzoom [id number]        Attending/Fellow:        MALIK JACOBS MD                Procedure Room #:        PROCEDURE ROOM 2                        ASA Class:        P3 - 2024 11:20 AM MALIK JACOBS MD        History of Present Illness:        Anemia, historyof AVMs        Administered Medications:        As per Anesthesiology Record        Indications:        Angiodysplasia of stomach and duodenum with bleedin.83 - K31.811        Iron deficiency anemia: 280.9 - D50.9        Procedure:        The procedure, indications, preparation and potential complications were    explained to the patient, who indicated understanding and signed the    corresponding consent forms. MAC was administered by anesthesiologist.    Continuous pulse oximetry and blood pressure monitoring were used throughout the    procedure. Supplemental oxygen was used. Patient was placed in the left lateral    decubitus position. The endoscope was introduced through the mouth and advanced    under direct visualization until the second part of the duodenum was reached.    Patient tolerance to the procedure was good. The procedure was not difficult.    Blood loss was minimal.        Limitations/Complications:        There were no apparent limitations or complications        Findings:        Esophagus Additional esophagus findings Grade B, non-bleeding esophagitis. Some    sloughing of esophageal mucosa. Regular Z-line at 34cm from incisors..        Stomach Mucosa Normal mucosa was noted in the whole stomach.        Duodenum Flat lesions A single medium non-bleeding angioectasia was seen in the    third portion of duodenum. APC was used to treat lesion.        Jejunum Flat lesions A single small non-bleeding angioectasia was seen in the    proximal jejunum. APC was used to treat lesion.        Impressions:        Grade B, non-bleeding esophagitis. Some sloughing of esophageal mucosa.    Regular Z-line at 34cm from incisors. .        Normal mucosa in the whole stomach.        Angioectasia in the third portion of duodenum. (APC).        Angioectasia in the proximal jejunum. (APC).        Plan:        Resume diet and home medications. PPI BID 30 min before meals. IV iron infusions    prn. Trend Hgb and transfuse as needed to keep Hgb >8. Continue DAPT. No    NSAIDs.                MALIK JACOBS MD        Version 1, Electronically signed on 2024 11:32:03 AM by MALIK JACOBS MD    < end of copied text >

## 2024-11-19 NOTE — PROGRESS NOTE ADULT - NS ATTEND AMEND GEN_ALL_CORE FT
I agree with assessment and plan as above. Pt is s/p enteroscopy for evaluation of anemia and dark stools, found to have duodenal and jejunal AVMs, treated with APC. Patient reports brown stool today. Hgb is stable. Resume DAPT. Patient to follow up with regular hematologist after discharge for IV iron infusions. GI signing off, please call back as needed.

## 2024-11-19 NOTE — DISCHARGE NOTE NURSING/CASE MANAGEMENT/SOCIAL WORK - NSDCPEFALRISK_GEN_ALL_CORE
For information on Fall & Injury Prevention, visit: [email or web address] OR  [email or web address] OR  https://www.cdc.gov/steadi/patient.html

## 2024-11-19 NOTE — PROGRESS NOTE ADULT - ASSESSMENT
69-year-old female with a history of iron deficiency anemia, AVMs, peripheral artery disease who presents status post vascular intervention with stent placement and thrombectomy    #Chronic Iron deficiency anemia  Enteroscopy (11/18/24) with AVMs in duodenum and jejunum, treated with APC.  -Trend CBC daily, hgb stable at 9.6. No further bleeding.  -PPI BID for GI mucosal cytoprotection  -Avoid NSAIDs  -Continue regular diet   -Okay to continue DAPT  -Outpatient follow up with hematology  GI will sign off now. Please call us back with any questions or concern.   _________________________________________________________________  Assessment and recommendations are final when note is signed by the attending physician.

## 2024-11-19 NOTE — DISCHARGE NOTE NURSING/CASE MANAGEMENT/SOCIAL WORK - FINANCIAL ASSISTANCE
[facility] provides services at a reduced cost to those who are determined to be eligible through [facility]’s financial assistance program. Information regarding [facility]’s financial assistance program can be found by going to [email or web address] or by calling [phone].

## 2024-11-19 NOTE — DISCHARGE NOTE NURSING/CASE MANAGEMENT/SOCIAL WORK - PATIENT PORTAL LINK FT
You can access the FollowMyHealth Patient Portal offered by [facility] by registering at the following website: [email or web address]. By joining [employer]’s FollowMyHealth portal, you will also be able to view your health information using other applications (apps) compatible with our system.

## 2024-11-19 NOTE — PROGRESS NOTE ADULT - PROVIDER SPECIALTY LIST ADULT
Intervent Cardiology
Intervent Cardiology
Hospitalist
Internal Medicine
Internal Medicine
Gastroenterology
Gastroenterology
Intervent Cardiology
Intervent Cardiology

## 2024-11-20 DIAGNOSIS — I73.9 PERIPHERAL VASCULAR DISEASE, UNSPECIFIED: ICD-10-CM

## 2024-11-20 DIAGNOSIS — I25.10 ATHEROSCLEROTIC HEART DISEASE OF NATIVE CORONARY ARTERY W/OUT ANGINA PECTORIS: ICD-10-CM

## 2024-11-25 ENCOUNTER — NON-APPOINTMENT (OUTPATIENT)
Age: 69
End: 2024-11-25

## 2024-11-26 PROCEDURE — 80048 BASIC METABOLIC PNL TOTAL CA: CPT

## 2024-11-26 PROCEDURE — 87640 STAPH A DNA AMP PROBE: CPT

## 2024-11-26 PROCEDURE — 86902 BLOOD TYPE ANTIGEN DONOR EA: CPT

## 2024-11-26 PROCEDURE — 75716 ARTERY X-RAYS ARMS/LEGS: CPT | Mod: XU

## 2024-11-26 PROCEDURE — 37224: CPT | Mod: XS

## 2024-11-26 PROCEDURE — C1725: CPT

## 2024-11-26 PROCEDURE — 37253 INTRVASC US NONCORONARY ADDL: CPT | Mod: XS

## 2024-11-26 PROCEDURE — 80061 LIPID PANEL: CPT

## 2024-11-26 PROCEDURE — 75710 ARTERY X-RAYS ARM/LEG: CPT | Mod: XS

## 2024-11-26 PROCEDURE — 86870 RBC ANTIBODY IDENTIFICATION: CPT

## 2024-11-26 PROCEDURE — 84100 ASSAY OF PHOSPHORUS: CPT

## 2024-11-26 PROCEDURE — 85025 COMPLETE CBC W/AUTO DIFF WBC: CPT

## 2024-11-26 PROCEDURE — 37184 PRIM ART M-THRMBC 1ST VSL: CPT

## 2024-11-26 PROCEDURE — 82550 ASSAY OF CK (CPK): CPT

## 2024-11-26 PROCEDURE — C1757: CPT

## 2024-11-26 PROCEDURE — P9040: CPT

## 2024-11-26 PROCEDURE — 80076 HEPATIC FUNCTION PANEL: CPT

## 2024-11-26 PROCEDURE — C1753: CPT

## 2024-11-26 PROCEDURE — C9765: CPT

## 2024-11-26 PROCEDURE — C1769: CPT

## 2024-11-26 PROCEDURE — 85610 PROTHROMBIN TIME: CPT

## 2024-11-26 PROCEDURE — 86880 COOMBS TEST DIRECT: CPT

## 2024-11-26 PROCEDURE — 82553 CREATINE MB FRACTION: CPT

## 2024-11-26 PROCEDURE — 86900 BLOOD TYPING SEROLOGIC ABO: CPT

## 2024-11-26 PROCEDURE — C1773: CPT

## 2024-11-26 PROCEDURE — 36415 COLL VENOUS BLD VENIPUNCTURE: CPT

## 2024-11-26 PROCEDURE — 87641 MR-STAPH DNA AMP PROBE: CPT

## 2024-11-26 PROCEDURE — 93925 LOWER EXTREMITY STUDY: CPT

## 2024-11-26 PROCEDURE — 93005 ELECTROCARDIOGRAM TRACING: CPT

## 2024-11-26 PROCEDURE — 85027 COMPLETE CBC AUTOMATED: CPT

## 2024-11-26 PROCEDURE — C2623: CPT

## 2024-11-26 PROCEDURE — 37252 INTRVASC US NONCORONARY 1ST: CPT

## 2024-11-26 PROCEDURE — 86905 BLOOD TYPING RBC ANTIGENS: CPT

## 2024-11-26 PROCEDURE — 83735 ASSAY OF MAGNESIUM: CPT

## 2024-11-26 PROCEDURE — C1887: CPT

## 2024-11-26 PROCEDURE — 37221: CPT

## 2024-11-26 PROCEDURE — C1876: CPT

## 2024-11-26 PROCEDURE — 86850 RBC ANTIBODY SCREEN: CPT

## 2024-11-26 PROCEDURE — 86901 BLOOD TYPING SEROLOGIC RH(D): CPT

## 2024-11-26 PROCEDURE — C1894: CPT

## 2024-11-26 PROCEDURE — P9016: CPT

## 2024-11-26 PROCEDURE — 86922 COMPATIBILITY TEST ANTIGLOB: CPT

## 2024-11-26 PROCEDURE — 82272 OCCULT BLD FECES 1-3 TESTS: CPT

## 2024-11-26 PROCEDURE — 83605 ASSAY OF LACTIC ACID: CPT

## 2024-11-26 PROCEDURE — 36430 TRANSFUSION BLD/BLD COMPNT: CPT

## 2024-11-26 PROCEDURE — C1874: CPT

## 2024-11-26 PROCEDURE — 83036 HEMOGLOBIN GLYCOSYLATED A1C: CPT

## 2024-11-26 PROCEDURE — 74176 CT ABD & PELVIS W/O CONTRAST: CPT | Mod: MC

## 2024-12-03 ENCOUNTER — APPOINTMENT (OUTPATIENT)
Dept: INFUSION THERAPY | Facility: CANCER CENTER | Age: 69
End: 2024-12-03

## 2024-12-03 RX ORDER — CLOPIDOGREL 75 MG/1
1 TABLET, FILM COATED ORAL
Refills: 0 | DISCHARGE

## 2024-12-04 ENCOUNTER — APPOINTMENT (OUTPATIENT)
Dept: CARDIOLOGY | Facility: CLINIC | Age: 69
End: 2024-12-04
Payer: MEDICARE

## 2024-12-04 PROCEDURE — 93923 UPR/LXTR ART STDY 3+ LVLS: CPT

## 2024-12-06 ENCOUNTER — NON-APPOINTMENT (OUTPATIENT)
Age: 69
End: 2024-12-06

## 2024-12-06 ENCOUNTER — APPOINTMENT (OUTPATIENT)
Dept: INFUSION THERAPY | Facility: CANCER CENTER | Age: 69
End: 2024-12-06

## 2024-12-06 ENCOUNTER — APPOINTMENT (OUTPATIENT)
Dept: CARDIOLOGY | Facility: CLINIC | Age: 69
End: 2024-12-06
Payer: MEDICARE

## 2024-12-06 ENCOUNTER — APPOINTMENT (OUTPATIENT)
Dept: ULTRASOUND IMAGING | Facility: CLINIC | Age: 69
End: 2024-12-06
Payer: MEDICARE

## 2024-12-06 VITALS
BODY MASS INDEX: 21.08 KG/M2 | HEART RATE: 72 BPM | OXYGEN SATURATION: 98 % | WEIGHT: 119 LBS | DIASTOLIC BLOOD PRESSURE: 60 MMHG | SYSTOLIC BLOOD PRESSURE: 150 MMHG

## 2024-12-06 DIAGNOSIS — Z95.5 PRESENCE OF CORONARY ANGIOPLASTY IMPLANT AND GRAFT: ICD-10-CM

## 2024-12-06 DIAGNOSIS — E78.00 PURE HYPERCHOLESTEROLEMIA, UNSPECIFIED: ICD-10-CM

## 2024-12-06 DIAGNOSIS — M79.606 PAIN IN LEG, UNSPECIFIED: ICD-10-CM

## 2024-12-06 DIAGNOSIS — I25.10 ATHEROSCLEROTIC HEART DISEASE OF NATIVE CORONARY ARTERY W/OUT ANGINA PECTORIS: ICD-10-CM

## 2024-12-06 DIAGNOSIS — L03.90 CELLULITIS, UNSPECIFIED: ICD-10-CM

## 2024-12-06 DIAGNOSIS — Z86.79 PERSONAL HISTORY OF OTHER DISEASES OF THE CIRCULATORY SYSTEM: ICD-10-CM

## 2024-12-06 DIAGNOSIS — F17.200 NICOTINE DEPENDENCE, UNSPECIFIED, UNCOMPLICATED: ICD-10-CM

## 2024-12-06 DIAGNOSIS — I73.9 PERIPHERAL VASCULAR DISEASE, UNSPECIFIED: ICD-10-CM

## 2024-12-06 DIAGNOSIS — I10 ESSENTIAL (PRIMARY) HYPERTENSION: ICD-10-CM

## 2024-12-06 PROCEDURE — 99215 OFFICE O/P EST HI 40 MIN: CPT

## 2024-12-06 PROCEDURE — 93971 EXTREMITY STUDY: CPT | Mod: LT

## 2024-12-07 ENCOUNTER — NON-APPOINTMENT (OUTPATIENT)
Age: 69
End: 2024-12-07

## 2024-12-09 ENCOUNTER — LABORATORY RESULT (OUTPATIENT)
Age: 69
End: 2024-12-09

## 2024-12-10 ENCOUNTER — NON-APPOINTMENT (OUTPATIENT)
Age: 69
End: 2024-12-10

## 2024-12-10 ENCOUNTER — APPOINTMENT (OUTPATIENT)
Dept: INFUSION THERAPY | Facility: CANCER CENTER | Age: 69
End: 2024-12-10

## 2024-12-13 ENCOUNTER — NON-APPOINTMENT (OUTPATIENT)
Age: 69
End: 2024-12-13

## 2024-12-13 RX ORDER — DOXYCYCLINE HYCLATE 100 MG/1
100 TABLET ORAL
Qty: 14 | Refills: 1 | Status: COMPLETED | COMMUNITY
Start: 2024-12-06

## 2024-12-18 ENCOUNTER — NON-APPOINTMENT (OUTPATIENT)
Age: 69
End: 2024-12-18

## 2024-12-27 ENCOUNTER — APPOINTMENT (OUTPATIENT)
Dept: MAMMOGRAPHY | Facility: CLINIC | Age: 69
End: 2024-12-27

## 2024-12-30 DIAGNOSIS — E61.1 IRON DEFICIENCY: ICD-10-CM

## 2024-12-31 ENCOUNTER — APPOINTMENT (OUTPATIENT)
Dept: HEMATOLOGY ONCOLOGY | Facility: CLINIC | Age: 69
End: 2024-12-31

## 2024-12-31 ENCOUNTER — LABORATORY RESULT (OUTPATIENT)
Age: 69
End: 2024-12-31

## 2024-12-31 ENCOUNTER — RESULT REVIEW (OUTPATIENT)
Age: 69
End: 2024-12-31

## 2024-12-31 ENCOUNTER — APPOINTMENT (OUTPATIENT)
Dept: CARDIOLOGY | Facility: CLINIC | Age: 69
End: 2024-12-31
Payer: MEDICARE

## 2024-12-31 LAB
BASOPHILS # BLD AUTO: 0.04 K/UL — SIGNIFICANT CHANGE UP (ref 0–0.2)
BASOPHILS NFR BLD AUTO: 0.7 % — SIGNIFICANT CHANGE UP (ref 0–2)
EOSINOPHIL # BLD AUTO: 0.15 K/UL — SIGNIFICANT CHANGE UP (ref 0–0.5)
EOSINOPHIL NFR BLD AUTO: 2.5 % — SIGNIFICANT CHANGE UP (ref 0–6)
HCT VFR BLD CALC: 35.4 % — SIGNIFICANT CHANGE UP (ref 34.5–45)
HGB BLD-MCNC: 11.4 G/DL — LOW (ref 11.5–15.5)
IMM GRANULOCYTES NFR BLD AUTO: 0.3 % — SIGNIFICANT CHANGE UP (ref 0–0.9)
LYMPHOCYTES # BLD AUTO: 1.42 K/UL — SIGNIFICANT CHANGE UP (ref 1–3.3)
LYMPHOCYTES # BLD AUTO: 23.3 % — SIGNIFICANT CHANGE UP (ref 13–44)
MCHC RBC-ENTMCNC: 29.1 PG — SIGNIFICANT CHANGE UP (ref 27–34)
MCHC RBC-ENTMCNC: 32.2 G/DL — SIGNIFICANT CHANGE UP (ref 32–36)
MCV RBC AUTO: 90.3 FL — SIGNIFICANT CHANGE UP (ref 80–100)
MONOCYTES # BLD AUTO: 0.77 K/UL — SIGNIFICANT CHANGE UP (ref 0–0.9)
MONOCYTES NFR BLD AUTO: 12.6 % — SIGNIFICANT CHANGE UP (ref 2–14)
NEUTROPHILS # BLD AUTO: 3.7 K/UL — SIGNIFICANT CHANGE UP (ref 1.8–7.4)
NEUTROPHILS NFR BLD AUTO: 60.6 % — SIGNIFICANT CHANGE UP (ref 43–77)
NRBC # BLD: 0 /100 WBCS — SIGNIFICANT CHANGE UP (ref 0–0)
PLATELET # BLD AUTO: 377 K/UL — SIGNIFICANT CHANGE UP (ref 150–400)
RBC # BLD: 3.92 M/UL — SIGNIFICANT CHANGE UP (ref 3.8–5.2)
RBC # FLD: 15.7 % — HIGH (ref 10.3–14.5)
WBC # BLD: 6.1 K/UL — SIGNIFICANT CHANGE UP (ref 3.8–10.5)
WBC # FLD AUTO: 6.1 K/UL — SIGNIFICANT CHANGE UP (ref 3.8–10.5)

## 2024-12-31 PROCEDURE — 36415 COLL VENOUS BLD VENIPUNCTURE: CPT

## 2024-12-31 PROCEDURE — 93925 LOWER EXTREMITY STUDY: CPT

## 2024-12-31 PROCEDURE — 85027 COMPLETE CBC AUTOMATED: CPT

## 2024-12-31 PROCEDURE — 96365 THER/PROPH/DIAG IV INF INIT: CPT

## 2025-01-06 ENCOUNTER — NON-APPOINTMENT (OUTPATIENT)
Age: 70
End: 2025-01-06

## 2025-01-06 ENCOUNTER — APPOINTMENT (OUTPATIENT)
Dept: CARDIOLOGY | Facility: CLINIC | Age: 70
End: 2025-01-06
Payer: MEDICARE

## 2025-01-06 VITALS
WEIGHT: 120 LBS | DIASTOLIC BLOOD PRESSURE: 52 MMHG | HEART RATE: 68 BPM | OXYGEN SATURATION: 98 % | BODY MASS INDEX: 21.26 KG/M2 | HEIGHT: 63 IN | SYSTOLIC BLOOD PRESSURE: 128 MMHG

## 2025-01-06 DIAGNOSIS — I10 ESSENTIAL (PRIMARY) HYPERTENSION: ICD-10-CM

## 2025-01-06 DIAGNOSIS — R73.03 PREDIABETES.: ICD-10-CM

## 2025-01-06 DIAGNOSIS — I25.10 ATHEROSCLEROTIC HEART DISEASE OF NATIVE CORONARY ARTERY W/OUT ANGINA PECTORIS: ICD-10-CM

## 2025-01-06 DIAGNOSIS — I73.9 PERIPHERAL VASCULAR DISEASE, UNSPECIFIED: ICD-10-CM

## 2025-01-06 DIAGNOSIS — E78.00 PURE HYPERCHOLESTEROLEMIA, UNSPECIFIED: ICD-10-CM

## 2025-01-06 DIAGNOSIS — Z95.5 PRESENCE OF CORONARY ANGIOPLASTY IMPLANT AND GRAFT: ICD-10-CM

## 2025-01-06 PROCEDURE — 99205 OFFICE O/P NEW HI 60 MIN: CPT

## 2025-01-06 RX ORDER — RIVAROXABAN 2.5 MG/1
2.5 TABLET, FILM COATED ORAL TWICE DAILY
Qty: 60 | Refills: 3 | Status: ACTIVE | COMMUNITY
Start: 2025-01-06 | End: 1900-01-01

## 2025-01-23 ENCOUNTER — OUTPATIENT (OUTPATIENT)
Dept: OUTPATIENT SERVICES | Facility: HOSPITAL | Age: 70
LOS: 1 days | End: 2025-01-23

## 2025-01-23 DIAGNOSIS — D50.9 IRON DEFICIENCY ANEMIA, UNSPECIFIED: ICD-10-CM

## 2025-01-23 DIAGNOSIS — Z98.891 HISTORY OF UTERINE SCAR FROM PREVIOUS SURGERY: Chronic | ICD-10-CM

## 2025-01-23 DIAGNOSIS — Z98.51 TUBAL LIGATION STATUS: Chronic | ICD-10-CM

## 2025-01-27 ENCOUNTER — APPOINTMENT (OUTPATIENT)
Dept: HEMATOLOGY ONCOLOGY | Facility: CLINIC | Age: 70
End: 2025-01-27

## 2025-02-25 ENCOUNTER — APPOINTMENT (OUTPATIENT)
Dept: CARDIOLOGY | Facility: CLINIC | Age: 70
End: 2025-02-25
Payer: MEDICARE

## 2025-02-25 VITALS
OXYGEN SATURATION: 97 % | DIASTOLIC BLOOD PRESSURE: 68 MMHG | WEIGHT: 123 LBS | HEART RATE: 67 BPM | BODY MASS INDEX: 21.79 KG/M2 | SYSTOLIC BLOOD PRESSURE: 130 MMHG

## 2025-02-25 DIAGNOSIS — F17.200 NICOTINE DEPENDENCE, UNSPECIFIED, UNCOMPLICATED: ICD-10-CM

## 2025-02-25 DIAGNOSIS — I73.9 PERIPHERAL VASCULAR DISEASE, UNSPECIFIED: ICD-10-CM

## 2025-02-25 DIAGNOSIS — D64.9 ANEMIA, UNSPECIFIED: ICD-10-CM

## 2025-02-25 DIAGNOSIS — I25.10 ATHEROSCLEROTIC HEART DISEASE OF NATIVE CORONARY ARTERY W/OUT ANGINA PECTORIS: ICD-10-CM

## 2025-02-25 DIAGNOSIS — E78.00 PURE HYPERCHOLESTEROLEMIA, UNSPECIFIED: ICD-10-CM

## 2025-02-25 DIAGNOSIS — Z95.5 PRESENCE OF CORONARY ANGIOPLASTY IMPLANT AND GRAFT: ICD-10-CM

## 2025-02-25 DIAGNOSIS — M79.606 PAIN IN LEG, UNSPECIFIED: ICD-10-CM

## 2025-02-25 DIAGNOSIS — I10 ESSENTIAL (PRIMARY) HYPERTENSION: ICD-10-CM

## 2025-02-25 PROCEDURE — 99215 OFFICE O/P EST HI 40 MIN: CPT

## 2025-06-04 ENCOUNTER — LABORATORY RESULT (OUTPATIENT)
Age: 70
End: 2025-06-04

## 2025-06-04 ENCOUNTER — APPOINTMENT (OUTPATIENT)
Dept: CARDIOLOGY | Facility: CLINIC | Age: 70
End: 2025-06-04
Payer: MEDICARE

## 2025-06-04 PROCEDURE — 93923 UPR/LXTR ART STDY 3+ LVLS: CPT

## 2025-06-05 ENCOUNTER — NON-APPOINTMENT (OUTPATIENT)
Age: 70
End: 2025-06-05

## 2025-06-09 ENCOUNTER — APPOINTMENT (OUTPATIENT)
Dept: CARDIOLOGY | Facility: CLINIC | Age: 70
End: 2025-06-09
Payer: MEDICARE

## 2025-06-09 VITALS
WEIGHT: 123 LBS | OXYGEN SATURATION: 98 % | SYSTOLIC BLOOD PRESSURE: 162 MMHG | BODY MASS INDEX: 21.79 KG/M2 | DIASTOLIC BLOOD PRESSURE: 66 MMHG | HEART RATE: 62 BPM

## 2025-06-09 PROBLEM — M71.20 BAKER CYST: Status: ACTIVE | Noted: 2025-06-09

## 2025-06-09 PROCEDURE — 93925 LOWER EXTREMITY STUDY: CPT

## 2025-06-09 PROCEDURE — 99215 OFFICE O/P EST HI 40 MIN: CPT

## 2025-06-13 ENCOUNTER — APPOINTMENT (OUTPATIENT)
Dept: MAMMOGRAPHY | Facility: CLINIC | Age: 70
End: 2025-06-13
Payer: MEDICARE

## 2025-06-13 PROCEDURE — 77067 SCR MAMMO BI INCL CAD: CPT

## 2025-06-13 PROCEDURE — 77063 BREAST TOMOSYNTHESIS BI: CPT

## 2025-06-20 ENCOUNTER — NON-APPOINTMENT (OUTPATIENT)
Age: 70
End: 2025-06-20

## 2025-07-01 ENCOUNTER — NON-APPOINTMENT (OUTPATIENT)
Age: 70
End: 2025-07-01

## 2025-07-25 ENCOUNTER — TRANSCRIPTION ENCOUNTER (OUTPATIENT)
Age: 70
End: 2025-07-25

## (undated) DEVICE — VENODYNE/SCD SLEEVE CALF MEDIUM

## (undated) DEVICE — SOL BAG NS 0.9% 1000ML

## (undated) DEVICE — TUBING IV EXTENSION MACRO W CLAVE 7"

## (undated) DEVICE — TUBING ALARIS PUMP MODULE NON-DEHP

## (undated) DEVICE — SENSOR O2 FINGER ADULT

## (undated) DEVICE — SYR LUER SLIP TIP 50CC

## (undated) DEVICE — WARMING BLANKET FULL ADULT

## (undated) DEVICE — DENTURE CUP PINK

## (undated) DEVICE — CATH IV SAFE BC 22G X 1" (BLUE)

## (undated) DEVICE — DRSG 2X2

## (undated) DEVICE — SOL IRR BAG NS 0.9% 1000ML

## (undated) DEVICE — PROBE FIAPC CIRC O.D. 2.3MM/6.9FR LNTH 220CM/7.2FT

## (undated) DEVICE — SSH-ERBE RM2 11351342: Type: DURABLE MEDICAL EQUIPMENT

## (undated) DEVICE — SOL IRR BAG H2O 1000ML

## (undated) DEVICE — PACK IV START WITH CHG

## (undated) DEVICE — UNDERPAD LINEN SAVER 23 X 36"

## (undated) DEVICE — SYR SLIP 10CC

## (undated) DEVICE — FORCEP RADIAL JAW 4 W NDL 2.4MM 2.8MM 240CM ORANGE DISP

## (undated) DEVICE — SYR IV FLUSH SALINE 10ML 30/TY

## (undated) DEVICE — BITE BLOCK ADULT 20 X 27MM (GREEN)

## (undated) DEVICE — GOWN IMPERV XL

## (undated) DEVICE — MASK PROCEED EARLOOP LVL 2 50/BX

## (undated) DEVICE — ELCTR GROUNDING PAD ADULT COVIDIEN

## (undated) DEVICE — DRSG CURITY GAUZE SPONGE 4 X 4" 12-PLY NON-STERILE